# Patient Record
Sex: MALE | Race: WHITE | NOT HISPANIC OR LATINO | Employment: OTHER | URBAN - METROPOLITAN AREA
[De-identification: names, ages, dates, MRNs, and addresses within clinical notes are randomized per-mention and may not be internally consistent; named-entity substitution may affect disease eponyms.]

---

## 2014-03-20 LAB — CRC RECOMMENDATION EXT: NORMAL

## 2017-01-17 ENCOUNTER — HISTORICAL (OUTPATIENT)
Dept: CARDIOLOGY | Facility: HOSPITAL | Age: 62
End: 2017-01-17

## 2017-02-14 ENCOUNTER — HISTORICAL (OUTPATIENT)
Dept: CARDIOLOGY | Facility: HOSPITAL | Age: 62
End: 2017-02-14

## 2017-03-14 ENCOUNTER — HISTORICAL (OUTPATIENT)
Dept: CARDIOLOGY | Facility: HOSPITAL | Age: 62
End: 2017-03-14

## 2017-04-11 ENCOUNTER — HISTORICAL (OUTPATIENT)
Dept: CARDIOLOGY | Facility: HOSPITAL | Age: 62
End: 2017-04-11

## 2017-05-09 ENCOUNTER — HISTORICAL (OUTPATIENT)
Dept: CARDIOLOGY | Facility: HOSPITAL | Age: 62
End: 2017-05-09

## 2017-06-06 ENCOUNTER — HISTORICAL (OUTPATIENT)
Dept: CARDIOLOGY | Facility: HOSPITAL | Age: 62
End: 2017-06-06

## 2017-07-10 ENCOUNTER — HISTORICAL (OUTPATIENT)
Dept: CARDIOLOGY | Facility: HOSPITAL | Age: 62
End: 2017-07-10

## 2017-08-07 ENCOUNTER — HISTORICAL (OUTPATIENT)
Dept: CARDIOLOGY | Facility: HOSPITAL | Age: 62
End: 2017-08-07

## 2017-09-05 ENCOUNTER — HISTORICAL (OUTPATIENT)
Dept: CARDIOLOGY | Facility: HOSPITAL | Age: 62
End: 2017-09-05

## 2017-10-02 ENCOUNTER — HISTORICAL (OUTPATIENT)
Dept: CARDIOLOGY | Facility: HOSPITAL | Age: 62
End: 2017-10-02

## 2017-11-01 ENCOUNTER — HISTORICAL (OUTPATIENT)
Dept: CARDIOLOGY | Facility: HOSPITAL | Age: 62
End: 2017-11-01

## 2017-11-29 ENCOUNTER — HISTORICAL (OUTPATIENT)
Dept: CARDIOLOGY | Facility: HOSPITAL | Age: 62
End: 2017-11-29

## 2017-12-27 ENCOUNTER — HISTORICAL (OUTPATIENT)
Dept: CARDIOLOGY | Facility: HOSPITAL | Age: 62
End: 2017-12-27

## 2018-01-24 ENCOUNTER — HISTORICAL (OUTPATIENT)
Dept: CARDIOLOGY | Facility: HOSPITAL | Age: 63
End: 2018-01-24

## 2018-02-21 ENCOUNTER — HISTORICAL (OUTPATIENT)
Dept: CARDIOLOGY | Facility: HOSPITAL | Age: 63
End: 2018-02-21

## 2018-03-21 ENCOUNTER — HISTORICAL (OUTPATIENT)
Dept: CARDIOLOGY | Facility: HOSPITAL | Age: 63
End: 2018-03-21

## 2018-05-07 ENCOUNTER — HISTORICAL (OUTPATIENT)
Dept: CARDIOLOGY | Facility: HOSPITAL | Age: 63
End: 2018-05-07

## 2018-05-07 LAB
ALBUMIN SERPL-MCNC: 4 GM/DL (ref 3.4–5)
ALBUMIN/GLOB SERPL: 1.2 RATIO (ref 1.1–2)
ALP SERPL-CCNC: 80 UNIT/L (ref 50–136)
ALT SERPL-CCNC: 38 UNIT/L (ref 12–78)
AST SERPL-CCNC: 32 UNIT/L (ref 15–37)
BILIRUB SERPL-MCNC: 0.8 MG/DL (ref 0.2–1)
BILIRUBIN DIRECT+TOT PNL SERPL-MCNC: 0.2 MG/DL (ref 0–0.5)
BILIRUBIN DIRECT+TOT PNL SERPL-MCNC: 0.6 MG/DL (ref 0–0.8)
BUN SERPL-MCNC: 17 MG/DL (ref 7–18)
CALCIUM SERPL-MCNC: 9.3 MG/DL (ref 8.5–10.1)
CHLORIDE SERPL-SCNC: 105 MMOL/L (ref 98–107)
CHOLEST SERPL-MCNC: 114 MG/DL (ref 0–200)
CHOLEST/HDLC SERPL: 3 {RATIO} (ref 0–5)
CO2 SERPL-SCNC: 28 MMOL/L (ref 21–32)
CREAT SERPL-MCNC: 1.14 MG/DL (ref 0.7–1.3)
EST. AVERAGE GLUCOSE BLD GHB EST-MCNC: 169 MG/DL
GLOBULIN SER-MCNC: 3.4 GM/DL (ref 2.4–3.5)
GLUCOSE SERPL-MCNC: 134 MG/DL (ref 74–106)
HBA1C MFR BLD: 7.5 % (ref 4.2–6.3)
HDLC SERPL-MCNC: 38 MG/DL (ref 35–60)
LDLC SERPL CALC-MCNC: 38 MG/DL (ref 0–129)
POTASSIUM SERPL-SCNC: 3.7 MMOL/L (ref 3.5–5.1)
PROT SERPL-MCNC: 7.4 GM/DL (ref 6.4–8.2)
SODIUM SERPL-SCNC: 141 MMOL/L (ref 136–145)
TRIGL SERPL-MCNC: 189 MG/DL (ref 30–150)
TSH SERPL-ACNC: 8.77 MIU/L (ref 0.36–3.74)
VLDLC SERPL CALC-MCNC: 38 MG/DL

## 2018-06-04 ENCOUNTER — HISTORICAL (OUTPATIENT)
Dept: CARDIOLOGY | Facility: HOSPITAL | Age: 63
End: 2018-06-04

## 2018-06-18 ENCOUNTER — HISTORICAL (OUTPATIENT)
Dept: CARDIOLOGY | Facility: HOSPITAL | Age: 63
End: 2018-06-18

## 2018-07-02 ENCOUNTER — HISTORICAL (OUTPATIENT)
Dept: CARDIOLOGY | Facility: HOSPITAL | Age: 63
End: 2018-07-02

## 2018-07-30 ENCOUNTER — HISTORICAL (OUTPATIENT)
Dept: CARDIOLOGY | Facility: HOSPITAL | Age: 63
End: 2018-07-30

## 2018-08-09 ENCOUNTER — HISTORICAL (OUTPATIENT)
Dept: ADMINISTRATIVE | Facility: HOSPITAL | Age: 63
End: 2018-08-09

## 2018-08-09 LAB
EST. AVERAGE GLUCOSE BLD GHB EST-MCNC: 151 MG/DL
GLUCOSE P FAST SERPL-MCNC: 111 MG/DL (ref 70–115)
HBA1C MFR BLD: 6.9 % (ref 4.2–6.3)
TSH SERPL-ACNC: 0.04 MIU/L (ref 0.36–3.74)

## 2018-08-27 ENCOUNTER — HISTORICAL (OUTPATIENT)
Dept: CARDIOLOGY | Facility: HOSPITAL | Age: 63
End: 2018-08-27

## 2018-08-27 LAB
INR PPP: 3.14 (ref 0–1.27)
PROTHROMBIN TIME: 33.2 SECOND(S) (ref 12.2–14.7)

## 2018-09-06 ENCOUNTER — HISTORICAL (OUTPATIENT)
Dept: CARDIOLOGY | Facility: HOSPITAL | Age: 63
End: 2018-09-06

## 2018-09-18 ENCOUNTER — HISTORICAL (OUTPATIENT)
Dept: CARDIOLOGY | Facility: HOSPITAL | Age: 63
End: 2018-09-18

## 2018-09-18 LAB
INR PPP: 3.26 (ref 0–1.27)
PROTHROMBIN TIME: 34.2 SECOND(S) (ref 12.2–14.7)

## 2018-10-02 ENCOUNTER — HISTORICAL (OUTPATIENT)
Dept: CARDIOLOGY | Facility: HOSPITAL | Age: 63
End: 2018-10-02

## 2018-10-23 ENCOUNTER — HISTORICAL (OUTPATIENT)
Dept: CARDIOLOGY | Facility: HOSPITAL | Age: 63
End: 2018-10-23

## 2018-11-20 ENCOUNTER — HISTORICAL (OUTPATIENT)
Dept: CARDIOLOGY | Facility: HOSPITAL | Age: 63
End: 2018-11-20

## 2018-12-18 ENCOUNTER — HISTORICAL (OUTPATIENT)
Dept: CARDIOLOGY | Facility: HOSPITAL | Age: 63
End: 2018-12-18

## 2019-01-15 ENCOUNTER — HISTORICAL (OUTPATIENT)
Dept: CARDIOLOGY | Facility: HOSPITAL | Age: 64
End: 2019-01-15

## 2019-01-29 ENCOUNTER — HISTORICAL (OUTPATIENT)
Dept: CARDIOLOGY | Facility: HOSPITAL | Age: 64
End: 2019-01-29

## 2019-02-15 ENCOUNTER — HISTORICAL (OUTPATIENT)
Dept: ADMINISTRATIVE | Facility: HOSPITAL | Age: 64
End: 2019-02-15

## 2019-02-15 LAB
ABS NEUT (OLG): 6.9 X10(3)/MCL (ref 2.1–9.2)
ALBUMIN SERPL-MCNC: 3.7 GM/DL (ref 3.4–5)
ALBUMIN/GLOB SERPL: 1.2 {RATIO}
ALP SERPL-CCNC: 73 UNIT/L (ref 50–136)
ALT SERPL-CCNC: 27 UNIT/L (ref 12–78)
AST SERPL-CCNC: 21 UNIT/L (ref 15–37)
BASOPHILS # BLD AUTO: 0.1 X10(3)/MCL (ref 0–0.2)
BASOPHILS NFR BLD AUTO: 1 %
BILIRUB SERPL-MCNC: 0.7 MG/DL (ref 0.2–1)
BILIRUBIN DIRECT+TOT PNL SERPL-MCNC: 0.1 MG/DL (ref 0–0.2)
BILIRUBIN DIRECT+TOT PNL SERPL-MCNC: 0.6 MG/DL (ref 0–0.8)
BUN SERPL-MCNC: 12 MG/DL (ref 7–18)
CALCIUM SERPL-MCNC: 8.8 MG/DL (ref 8.5–10.1)
CHLORIDE SERPL-SCNC: 105 MMOL/L (ref 98–107)
CHOLEST SERPL-MCNC: 109 MG/DL (ref 0–200)
CHOLEST/HDLC SERPL: 2.7 {RATIO} (ref 0–5)
CO2 SERPL-SCNC: 30 MMOL/L (ref 21–32)
CREAT SERPL-MCNC: 1.01 MG/DL (ref 0.7–1.3)
EOSINOPHIL # BLD AUTO: 0.4 X10(3)/MCL (ref 0–0.9)
EOSINOPHIL NFR BLD AUTO: 4 %
ERYTHROCYTE [DISTWIDTH] IN BLOOD BY AUTOMATED COUNT: 14.6 % (ref 11.5–17)
EST. AVERAGE GLUCOSE BLD GHB EST-MCNC: 163 MG/DL
GLOBULIN SER-MCNC: 3.1 GM/DL (ref 2.4–3.5)
GLUCOSE SERPL-MCNC: 116 MG/DL (ref 74–106)
HBA1C MFR BLD: 7.3 % (ref 4.2–6.3)
HCT VFR BLD AUTO: 46.2 % (ref 42–52)
HDLC SERPL-MCNC: 41 MG/DL (ref 35–60)
HGB BLD-MCNC: 14.4 GM/DL (ref 14–18)
LDLC SERPL CALC-MCNC: 31 MG/DL (ref 0–129)
LYMPHOCYTES # BLD AUTO: 2.1 X10(3)/MCL (ref 0.6–4.6)
LYMPHOCYTES NFR BLD AUTO: 19 %
MCH RBC QN AUTO: 29.6 PG (ref 27–31)
MCHC RBC AUTO-ENTMCNC: 31.2 GM/DL (ref 33–36)
MCV RBC AUTO: 94.9 FL (ref 80–94)
MONOCYTES # BLD AUTO: 1.6 X10(3)/MCL (ref 0.1–1.3)
MONOCYTES NFR BLD AUTO: 14 %
NEUTROPHILS # BLD AUTO: 6.9 X10(3)/MCL (ref 2.1–9.2)
NEUTROPHILS NFR BLD AUTO: 62 %
PLATELET # BLD AUTO: 404 X10(3)/MCL (ref 130–400)
PMV BLD AUTO: 10.1 FL (ref 9.4–12.4)
POTASSIUM SERPL-SCNC: 4.2 MMOL/L (ref 3.5–5.1)
PROT SERPL-MCNC: 6.8 GM/DL (ref 6.4–8.2)
RBC # BLD AUTO: 4.87 X10(6)/MCL (ref 4.7–6.1)
SODIUM SERPL-SCNC: 142 MMOL/L (ref 136–145)
TRIGL SERPL-MCNC: 183 MG/DL (ref 30–150)
TSH SERPL-ACNC: 0.22 MIU/L (ref 0.36–3.74)
VLDLC SERPL CALC-MCNC: 37 MG/DL
WBC # SPEC AUTO: 11.2 X10(3)/MCL (ref 4.5–11.5)

## 2019-02-26 ENCOUNTER — HISTORICAL (OUTPATIENT)
Dept: CARDIOLOGY | Facility: HOSPITAL | Age: 64
End: 2019-02-26

## 2019-03-26 ENCOUNTER — HISTORICAL (OUTPATIENT)
Dept: CARDIOLOGY | Facility: HOSPITAL | Age: 64
End: 2019-03-26

## 2019-04-15 ENCOUNTER — HISTORICAL (OUTPATIENT)
Dept: CARDIOLOGY | Facility: HOSPITAL | Age: 64
End: 2019-04-15

## 2019-04-16 ENCOUNTER — HISTORICAL (OUTPATIENT)
Dept: ADMINISTRATIVE | Facility: HOSPITAL | Age: 64
End: 2019-04-16

## 2019-04-24 ENCOUNTER — HISTORICAL (OUTPATIENT)
Dept: CARDIOLOGY | Facility: HOSPITAL | Age: 64
End: 2019-04-24

## 2019-05-15 ENCOUNTER — HISTORICAL (OUTPATIENT)
Dept: CARDIOLOGY | Facility: HOSPITAL | Age: 64
End: 2019-05-15

## 2019-05-21 ENCOUNTER — HISTORICAL (OUTPATIENT)
Dept: ADMINISTRATIVE | Facility: HOSPITAL | Age: 64
End: 2019-05-21

## 2019-05-28 ENCOUNTER — HISTORICAL (OUTPATIENT)
Dept: CARDIOLOGY | Facility: HOSPITAL | Age: 64
End: 2019-05-28

## 2019-06-12 ENCOUNTER — HISTORICAL (OUTPATIENT)
Dept: CARDIOLOGY | Facility: HOSPITAL | Age: 64
End: 2019-06-12

## 2019-07-10 ENCOUNTER — HISTORICAL (OUTPATIENT)
Dept: CARDIOLOGY | Facility: HOSPITAL | Age: 64
End: 2019-07-10

## 2019-08-07 ENCOUNTER — HISTORICAL (OUTPATIENT)
Dept: CARDIOLOGY | Facility: HOSPITAL | Age: 64
End: 2019-08-07

## 2019-08-20 ENCOUNTER — HISTORICAL (OUTPATIENT)
Dept: ADMINISTRATIVE | Facility: HOSPITAL | Age: 64
End: 2019-08-20

## 2019-08-20 LAB
BUN SERPL-MCNC: 21 MG/DL (ref 7–18)
CALCIUM SERPL-MCNC: 9.5 MG/DL (ref 8.5–10.1)
CHLORIDE SERPL-SCNC: 103 MMOL/L (ref 98–107)
CHOLEST SERPL-MCNC: 132 MG/DL (ref 0–200)
CHOLEST/HDLC SERPL: 3.2 {RATIO} (ref 0–5)
CO2 SERPL-SCNC: 33 MMOL/L (ref 21–32)
CREAT SERPL-MCNC: 1.35 MG/DL (ref 0.7–1.3)
CREAT/UREA NIT SERPL: 15.6
EST. AVERAGE GLUCOSE BLD GHB EST-MCNC: 143 MG/DL
GLUCOSE SERPL-MCNC: 132 MG/DL (ref 74–106)
HBA1C MFR BLD: 6.6 % (ref 4.2–6.3)
HDLC SERPL-MCNC: 41 MG/DL (ref 35–60)
LDLC SERPL CALC-MCNC: 46 MG/DL (ref 0–129)
POTASSIUM SERPL-SCNC: 4.8 MMOL/L (ref 3.5–5.1)
SODIUM SERPL-SCNC: 141 MMOL/L (ref 136–145)
TRIGL SERPL-MCNC: 224 MG/DL (ref 30–150)
TSH SERPL-ACNC: 1.84 MIU/L (ref 0.36–3.74)
VLDLC SERPL CALC-MCNC: 45 MG/DL

## 2019-09-04 ENCOUNTER — HISTORICAL (OUTPATIENT)
Dept: CARDIOLOGY | Facility: HOSPITAL | Age: 64
End: 2019-09-04

## 2019-09-04 LAB
INR PPP: 4.4 (ref 0–1.3)
PROTHROMBIN TIME: 43.4 SECOND(S) (ref 12–14)

## 2019-09-09 ENCOUNTER — HISTORICAL (OUTPATIENT)
Dept: CARDIOLOGY | Facility: HOSPITAL | Age: 64
End: 2019-09-09

## 2019-09-16 ENCOUNTER — HISTORICAL (OUTPATIENT)
Dept: CARDIOLOGY | Facility: HOSPITAL | Age: 64
End: 2019-09-16

## 2019-10-07 ENCOUNTER — HISTORICAL (OUTPATIENT)
Dept: CARDIOLOGY | Facility: HOSPITAL | Age: 64
End: 2019-10-07

## 2019-10-07 LAB
INR PPP: 4.3 (ref 0–1.3)
PROTHROMBIN TIME: 42.3 SECOND(S) (ref 12–14)

## 2019-10-10 ENCOUNTER — HISTORICAL (OUTPATIENT)
Dept: CARDIOLOGY | Facility: HOSPITAL | Age: 64
End: 2019-10-10

## 2019-10-17 ENCOUNTER — HISTORICAL (OUTPATIENT)
Dept: CARDIOLOGY | Facility: HOSPITAL | Age: 64
End: 2019-10-17

## 2019-10-31 ENCOUNTER — HISTORICAL (OUTPATIENT)
Dept: CARDIOLOGY | Facility: HOSPITAL | Age: 64
End: 2019-10-31

## 2019-11-13 ENCOUNTER — HISTORICAL (OUTPATIENT)
Dept: CARDIOLOGY | Facility: HOSPITAL | Age: 64
End: 2019-11-13

## 2019-12-11 ENCOUNTER — HISTORICAL (OUTPATIENT)
Dept: CARDIOLOGY | Facility: HOSPITAL | Age: 64
End: 2019-12-11

## 2019-12-18 ENCOUNTER — HISTORICAL (OUTPATIENT)
Dept: ADMINISTRATIVE | Facility: HOSPITAL | Age: 64
End: 2019-12-18

## 2020-01-08 ENCOUNTER — HISTORICAL (OUTPATIENT)
Dept: CARDIOLOGY | Facility: HOSPITAL | Age: 65
End: 2020-01-08

## 2020-02-05 ENCOUNTER — HISTORICAL (OUTPATIENT)
Dept: CARDIOLOGY | Facility: HOSPITAL | Age: 65
End: 2020-02-05

## 2020-03-04 ENCOUNTER — HISTORICAL (OUTPATIENT)
Dept: CARDIOLOGY | Facility: HOSPITAL | Age: 65
End: 2020-03-04

## 2020-04-15 ENCOUNTER — HISTORICAL (OUTPATIENT)
Dept: CARDIOLOGY | Facility: HOSPITAL | Age: 65
End: 2020-04-15

## 2020-05-13 ENCOUNTER — HISTORICAL (OUTPATIENT)
Dept: CARDIOLOGY | Facility: HOSPITAL | Age: 65
End: 2020-05-13

## 2020-06-03 ENCOUNTER — HISTORICAL (OUTPATIENT)
Dept: CARDIOLOGY | Facility: HOSPITAL | Age: 65
End: 2020-06-03

## 2020-07-22 ENCOUNTER — HISTORICAL (OUTPATIENT)
Dept: CARDIOLOGY | Facility: HOSPITAL | Age: 65
End: 2020-07-22

## 2020-08-19 ENCOUNTER — HISTORICAL (OUTPATIENT)
Dept: CARDIOLOGY | Facility: HOSPITAL | Age: 65
End: 2020-08-19

## 2020-09-02 ENCOUNTER — HISTORICAL (OUTPATIENT)
Dept: CARDIOLOGY | Facility: HOSPITAL | Age: 65
End: 2020-09-02

## 2020-09-30 ENCOUNTER — HISTORICAL (OUTPATIENT)
Dept: CARDIOLOGY | Facility: HOSPITAL | Age: 65
End: 2020-09-30

## 2020-10-14 ENCOUNTER — HISTORICAL (OUTPATIENT)
Dept: CARDIOLOGY | Facility: HOSPITAL | Age: 65
End: 2020-10-14

## 2020-10-15 ENCOUNTER — HISTORICAL (OUTPATIENT)
Dept: ADMINISTRATIVE | Facility: HOSPITAL | Age: 65
End: 2020-10-15

## 2020-10-15 LAB
ABS NEUT (OLG): 7.72 X10(3)/MCL (ref 2.1–9.2)
ALBUMIN SERPL-MCNC: 4.1 GM/DL (ref 3.4–5)
ALBUMIN/GLOB SERPL: 1.4 RATIO (ref 1.1–2)
ALP SERPL-CCNC: 85 UNIT/L (ref 40–150)
ALT SERPL-CCNC: 23 UNIT/L (ref 0–55)
AST SERPL-CCNC: 22 UNIT/L (ref 5–34)
BASOPHILS # BLD AUTO: 0.1 X10(3)/MCL (ref 0–0.2)
BASOPHILS NFR BLD AUTO: 1 %
BILIRUB SERPL-MCNC: 0.6 MG/DL
BILIRUBIN DIRECT+TOT PNL SERPL-MCNC: 0.2 MG/DL (ref 0–0.5)
BILIRUBIN DIRECT+TOT PNL SERPL-MCNC: 0.4 MG/DL (ref 0–0.8)
BUN SERPL-MCNC: 15.5 MG/DL (ref 8.4–25.7)
CALCIUM SERPL-MCNC: 9.2 MG/DL (ref 8.8–10)
CHLORIDE SERPL-SCNC: 107 MMOL/L (ref 98–107)
CHOLEST SERPL-MCNC: 113 MG/DL
CHOLEST/HDLC SERPL: 3 {RATIO} (ref 0–5)
CO2 SERPL-SCNC: 27 MMOL/L (ref 23–31)
CREAT SERPL-MCNC: 1.05 MG/DL (ref 0.73–1.18)
EOSINOPHIL # BLD AUTO: 0.4 X10(3)/MCL (ref 0–0.9)
EOSINOPHIL NFR BLD AUTO: 3 %
ERYTHROCYTE [DISTWIDTH] IN BLOOD BY AUTOMATED COUNT: 15.2 % (ref 11.5–17)
EST. AVERAGE GLUCOSE BLD GHB EST-MCNC: 154.2 MG/DL
GLOBULIN SER-MCNC: 3 GM/DL (ref 2.4–3.5)
GLUCOSE SERPL-MCNC: 122 MG/DL (ref 82–115)
HBA1C MFR BLD: 7 %
HCT VFR BLD AUTO: 45.8 % (ref 42–52)
HDLC SERPL-MCNC: 36 MG/DL (ref 35–60)
HGB BLD-MCNC: 14.3 GM/DL (ref 14–18)
LDLC SERPL CALC-MCNC: 36 MG/DL (ref 50–140)
LYMPHOCYTES # BLD AUTO: 1.7 X10(3)/MCL (ref 0.6–4.6)
LYMPHOCYTES NFR BLD AUTO: 15 %
MCH RBC QN AUTO: 29.5 PG (ref 27–31)
MCHC RBC AUTO-ENTMCNC: 31.2 GM/DL (ref 33–36)
MCV RBC AUTO: 94.4 FL (ref 80–94)
MONOCYTES # BLD AUTO: 1.5 X10(3)/MCL (ref 0.1–1.3)
MONOCYTES NFR BLD AUTO: 13 %
NEUTROPHILS # BLD AUTO: 7.72 X10(3)/MCL (ref 2.1–9.2)
NEUTROPHILS NFR BLD AUTO: 67 %
PLATELET # BLD AUTO: 435 X10(3)/MCL (ref 130–400)
PMV BLD AUTO: 9.9 FL (ref 9.4–12.4)
POTASSIUM SERPL-SCNC: 4.7 MMOL/L (ref 3.5–5.1)
PROT SERPL-MCNC: 7.1 GM/DL (ref 5.8–7.6)
RBC # BLD AUTO: 4.85 X10(6)/MCL (ref 4.7–6.1)
SODIUM SERPL-SCNC: 148 MMOL/L (ref 136–145)
TRIGL SERPL-MCNC: 207 MG/DL (ref 34–140)
TSH SERPL-ACNC: 2.47 UIU/ML (ref 0.35–4.94)
VLDLC SERPL CALC-MCNC: 41 MG/DL
WBC # SPEC AUTO: 11.5 X10(3)/MCL (ref 4.5–11.5)

## 2020-10-19 ENCOUNTER — HISTORICAL (OUTPATIENT)
Dept: ADMINISTRATIVE | Facility: HOSPITAL | Age: 65
End: 2020-10-19

## 2020-10-28 ENCOUNTER — HISTORICAL (OUTPATIENT)
Dept: CARDIOLOGY | Facility: HOSPITAL | Age: 65
End: 2020-10-28

## 2020-11-25 ENCOUNTER — HISTORICAL (OUTPATIENT)
Dept: CARDIOLOGY | Facility: HOSPITAL | Age: 65
End: 2020-11-25

## 2020-12-10 ENCOUNTER — HISTORICAL (OUTPATIENT)
Dept: CARDIOLOGY | Facility: HOSPITAL | Age: 65
End: 2020-12-10

## 2020-12-10 LAB
ERYTHROCYTE [DISTWIDTH] IN BLOOD BY AUTOMATED COUNT: 14.6 % (ref 11.5–17)
ERYTHROCYTE [SEDIMENTATION RATE] IN BLOOD: 14 MM/HR (ref 0–15)
HCT VFR BLD AUTO: 47.2 % (ref 42–52)
HGB BLD-MCNC: 14.7 GM/DL (ref 14–18)
MCH RBC QN AUTO: 30.4 PG (ref 27–31)
MCHC RBC AUTO-ENTMCNC: 31.1 GM/DL (ref 33–36)
MCV RBC AUTO: 97.7 FL (ref 80–94)
PLATELET # BLD AUTO: 435 X10(3)/MCL (ref 130–400)
PMV BLD AUTO: 10.5 FL (ref 9.4–12.4)
RBC # BLD AUTO: 4.83 X10(6)/MCL (ref 4.7–6.1)
URATE SERPL-MCNC: 6.7 MG/DL (ref 3.5–7.2)
WBC # SPEC AUTO: 17.5 X10(3)/MCL (ref 4.5–11.5)

## 2020-12-17 ENCOUNTER — HISTORICAL (OUTPATIENT)
Dept: CARDIOLOGY | Facility: HOSPITAL | Age: 65
End: 2020-12-17

## 2021-01-14 ENCOUNTER — HISTORICAL (OUTPATIENT)
Dept: CARDIOLOGY | Facility: HOSPITAL | Age: 66
End: 2021-01-14

## 2021-01-21 ENCOUNTER — HISTORICAL (OUTPATIENT)
Dept: CARDIOLOGY | Facility: HOSPITAL | Age: 66
End: 2021-01-21

## 2021-01-27 ENCOUNTER — HISTORICAL (OUTPATIENT)
Dept: ADMINISTRATIVE | Facility: HOSPITAL | Age: 66
End: 2021-01-27

## 2021-03-01 ENCOUNTER — HISTORICAL (OUTPATIENT)
Dept: CARDIOLOGY | Facility: HOSPITAL | Age: 66
End: 2021-03-01

## 2021-03-23 ENCOUNTER — HISTORICAL (OUTPATIENT)
Dept: ADMINISTRATIVE | Facility: HOSPITAL | Age: 66
End: 2021-03-23

## 2021-03-23 LAB — URATE SERPL-MCNC: 4.6 MG/DL (ref 3.5–7.2)

## 2021-03-29 ENCOUNTER — HISTORICAL (OUTPATIENT)
Dept: CARDIOLOGY | Facility: HOSPITAL | Age: 66
End: 2021-03-29

## 2021-04-12 ENCOUNTER — HISTORICAL (OUTPATIENT)
Dept: CARDIOLOGY | Facility: HOSPITAL | Age: 66
End: 2021-04-12

## 2021-04-22 ENCOUNTER — HISTORICAL (OUTPATIENT)
Dept: CARDIOLOGY | Facility: HOSPITAL | Age: 66
End: 2021-04-22

## 2021-05-13 ENCOUNTER — HISTORICAL (OUTPATIENT)
Dept: CARDIOLOGY | Facility: HOSPITAL | Age: 66
End: 2021-05-13

## 2021-06-10 ENCOUNTER — HISTORICAL (OUTPATIENT)
Dept: CARDIOLOGY | Facility: HOSPITAL | Age: 66
End: 2021-06-10

## 2021-06-11 ENCOUNTER — HISTORICAL (OUTPATIENT)
Dept: ADMINISTRATIVE | Facility: HOSPITAL | Age: 66
End: 2021-06-11

## 2021-06-11 LAB
ABS NEUT (OLG): 5.69 X10(3)/MCL (ref 2.1–9.2)
BASOPHILS # BLD AUTO: 0.1 X10(3)/MCL (ref 0–0.2)
BASOPHILS NFR BLD AUTO: 1 %
BUN SERPL-MCNC: 18.2 MG/DL (ref 8.4–25.7)
CALCIUM SERPL-MCNC: 9.5 MG/DL (ref 8.8–10)
CHLORIDE SERPL-SCNC: 99 MMOL/L (ref 98–107)
CO2 SERPL-SCNC: 29 MMOL/L (ref 23–31)
CREAT SERPL-MCNC: 1.22 MG/DL (ref 0.73–1.18)
CREAT/UREA NIT SERPL: 15
EOSINOPHIL # BLD AUTO: 0.3 X10(3)/MCL (ref 0–0.9)
EOSINOPHIL NFR BLD AUTO: 4 %
ERYTHROCYTE [DISTWIDTH] IN BLOOD BY AUTOMATED COUNT: 14.6 % (ref 11.5–17)
EST. AVERAGE GLUCOSE BLD GHB EST-MCNC: 145.6 MG/DL
GLUCOSE SERPL-MCNC: 129 MG/DL (ref 82–115)
HBA1C MFR BLD: 6.7 %
HCT VFR BLD AUTO: 44.2 % (ref 42–52)
HGB BLD-MCNC: 14.4 GM/DL (ref 14–18)
LYMPHOCYTES # BLD AUTO: 1.6 X10(3)/MCL (ref 0.6–4.6)
LYMPHOCYTES NFR BLD AUTO: 18 %
MCH RBC QN AUTO: 30.3 PG (ref 27–31)
MCHC RBC AUTO-ENTMCNC: 32.6 GM/DL (ref 33–36)
MCV RBC AUTO: 92.9 FL (ref 80–94)
MONOCYTES # BLD AUTO: 1.3 X10(3)/MCL (ref 0.1–1.3)
MONOCYTES NFR BLD AUTO: 14 %
NEUTROPHILS # BLD AUTO: 5.69 X10(3)/MCL (ref 2.1–9.2)
NEUTROPHILS NFR BLD AUTO: 63 %
PLATELET # BLD AUTO: 430 X10(3)/MCL (ref 130–400)
PMV BLD AUTO: 10.8 FL (ref 9.4–12.4)
POTASSIUM SERPL-SCNC: 4.6 MMOL/L (ref 3.5–5.1)
RBC # BLD AUTO: 4.76 X10(6)/MCL (ref 4.7–6.1)
SODIUM SERPL-SCNC: 136 MMOL/L (ref 136–145)
TSH SERPL-ACNC: 2.67 UIU/ML (ref 0.35–4.94)
WBC # SPEC AUTO: 9 X10(3)/MCL (ref 4.5–11.5)

## 2021-06-16 ENCOUNTER — HISTORICAL (OUTPATIENT)
Dept: ADMINISTRATIVE | Facility: HOSPITAL | Age: 66
End: 2021-06-16

## 2021-06-16 LAB
BNP BLD-MCNC: 49.7 PG/ML
BUN SERPL-MCNC: 24 MG/DL (ref 8.4–25.7)
CALCIUM SERPL-MCNC: 9.9 MG/DL (ref 8.8–10)
CHLORIDE SERPL-SCNC: 99 MMOL/L (ref 98–107)
CO2 SERPL-SCNC: 32 MMOL/L (ref 23–31)
CREAT SERPL-MCNC: 1.96 MG/DL (ref 0.73–1.18)
CREAT/UREA NIT SERPL: 12
GLUCOSE SERPL-MCNC: 96 MG/DL (ref 82–115)
POTASSIUM SERPL-SCNC: 4.8 MMOL/L (ref 3.5–5.1)
SODIUM SERPL-SCNC: 141 MMOL/L (ref 136–145)

## 2021-07-01 ENCOUNTER — HISTORICAL (OUTPATIENT)
Dept: CARDIOLOGY | Facility: HOSPITAL | Age: 66
End: 2021-07-01

## 2021-07-20 ENCOUNTER — HISTORICAL (OUTPATIENT)
Dept: ADMINISTRATIVE | Facility: HOSPITAL | Age: 66
End: 2021-07-20

## 2021-07-29 ENCOUNTER — HISTORICAL (OUTPATIENT)
Dept: CARDIOLOGY | Facility: HOSPITAL | Age: 66
End: 2021-07-29

## 2021-08-16 ENCOUNTER — HISTORICAL (OUTPATIENT)
Dept: CARDIOLOGY | Facility: HOSPITAL | Age: 66
End: 2021-08-16

## 2021-08-23 ENCOUNTER — HISTORICAL (OUTPATIENT)
Dept: CARDIOLOGY | Facility: HOSPITAL | Age: 66
End: 2021-08-23

## 2021-09-13 ENCOUNTER — HISTORICAL (OUTPATIENT)
Dept: CARDIOLOGY | Facility: HOSPITAL | Age: 66
End: 2021-09-13

## 2021-10-05 ENCOUNTER — HISTORICAL (OUTPATIENT)
Dept: CARDIOLOGY | Facility: HOSPITAL | Age: 66
End: 2021-10-05

## 2021-10-11 ENCOUNTER — HISTORICAL (OUTPATIENT)
Dept: CARDIOLOGY | Facility: HOSPITAL | Age: 66
End: 2021-10-11

## 2021-11-18 LAB
ABS NEUT (OLG): 7.38 X10(3)/MCL (ref 2.1–9.2)
ALBUMIN SERPL-MCNC: 2.9 GM/DL (ref 3.4–4.8)
ALBUMIN/GLOB SERPL: 0.9 RATIO (ref 1.1–2)
ALP SERPL-CCNC: 73 UNIT/L (ref 40–150)
ALT SERPL-CCNC: 11 UNIT/L (ref 0–55)
AST SERPL-CCNC: 13 UNIT/L (ref 5–34)
BASOPHILS # BLD AUTO: 0.11 X10(3)/MCL (ref 0–0.2)
BASOPHILS NFR BLD AUTO: 1.1 % (ref 0–0.9)
BILIRUB SERPL-MCNC: 0.5 MG/DL (ref 0.2–1.2)
BILIRUBIN DIRECT+TOT PNL SERPL-MCNC: 0.2 MG/DL (ref 0–0.8)
BILIRUBIN DIRECT+TOT PNL SERPL-MCNC: 0.3 MG/DL (ref 0–0.5)
BUN SERPL-MCNC: 15.9 MG/DL (ref 8.4–25.7)
CALCIUM SERPL-MCNC: 8.6 MG/DL (ref 8.8–10)
CHLORIDE SERPL-SCNC: 102 MMOL/L (ref 98–107)
CO2 SERPL-SCNC: 28 MMOL/L (ref 23–31)
CREAT SERPL-MCNC: 0.78 MG/DL (ref 0.72–1.25)
EOSINOPHIL # BLD AUTO: 0.3 X10(3)/MCL (ref 0–0.9)
EOSINOPHIL NFR BLD AUTO: 3 % (ref 0–6.5)
ERYTHROCYTE [DISTWIDTH] IN BLOOD BY AUTOMATED COUNT: 15.6 % (ref 11.5–17)
EST. AVERAGE GLUCOSE BLD GHB EST-MCNC: 119.8 MG/DL
GLOBULIN SER-MCNC: 3.2 GM/DL (ref 2.4–3.5)
GLUCOSE SERPL-MCNC: 128 MG/DL (ref 82–115)
HBA1C MFR BLD: 5.8 %
HCT VFR BLD AUTO: 32.3 % (ref 42–52)
HGB BLD-MCNC: 10.1 GM/DL (ref 14–18)
IMM GRANULOCYTES # BLD AUTO: 0.05 10*3/UL (ref 0–0.02)
IMM GRANULOCYTES NFR BLD AUTO: 0.5 % (ref 0–0.43)
INR PPP: 1.3 (ref 2–3)
LYMPHOCYTES # BLD AUTO: 0.87 X10(3)/MCL (ref 0.6–4.6)
LYMPHOCYTES NFR BLD AUTO: 8.6 % (ref 16.2–38.3)
MCH RBC QN AUTO: 29.8 PG (ref 27–31)
MCHC RBC AUTO-ENTMCNC: 31.3 GM/DL (ref 33–36)
MCV RBC AUTO: 95.3 FL (ref 80–94)
MONOCYTES # BLD AUTO: 1.4 X10(3)/MCL (ref 0.1–1.3)
MONOCYTES NFR BLD AUTO: 13.8 % (ref 4.7–11.3)
NEUTROPHILS # BLD AUTO: 7.38 X10(3)/MCL (ref 2.1–9.2)
NEUTROPHILS NFR BLD AUTO: 73 % (ref 49.1–73.4)
NRBC BLD AUTO-RTO: 0.2 % (ref 0–0.2)
PLATELET # BLD AUTO: 543 X10(3)/MCL (ref 130–400)
PMV BLD AUTO: 9.4 FL (ref 7.4–10.4)
POTASSIUM SERPL-SCNC: 4.3 MMOL/L (ref 3.5–5.1)
PREALB SERPL-MCNC: 19.6 MG/DL (ref 16–42)
PROT SERPL-MCNC: 6.1 GM/DL (ref 5.8–7.6)
PROTHROMBIN TIME: 15.5 SEC (ref 11.7–14.5)
RBC # BLD AUTO: 3.39 X10(6)/MCL (ref 4.7–6.1)
SODIUM SERPL-SCNC: 138 MMOL/L (ref 136–145)
WBC # SPEC AUTO: 10.1 X10(3)/MCL (ref 4.5–11.5)

## 2021-11-20 LAB
INR PPP: 1.4 (ref 2–3)
PROTHROMBIN TIME: 17.1 SEC (ref 11.7–14.5)

## 2021-11-22 ENCOUNTER — HISTORICAL (OUTPATIENT)
Dept: ADMINISTRATIVE | Facility: HOSPITAL | Age: 66
End: 2021-11-22

## 2021-11-22 LAB
ABS NEUT (OLG): 8.42 X10(3)/MCL (ref 2.1–9.2)
ALBUMIN SERPL-MCNC: 3.1 GM/DL (ref 3.4–4.8)
ALBUMIN/GLOB SERPL: 1.1 RATIO (ref 1.1–2)
ALP SERPL-CCNC: 85 UNIT/L (ref 40–150)
ALT SERPL-CCNC: 10 UNIT/L (ref 0–55)
AST SERPL-CCNC: 12 UNIT/L (ref 5–34)
BASOPHILS # BLD AUTO: 0.08 X10(3)/MCL (ref 0–0.2)
BASOPHILS NFR BLD AUTO: 0.7 % (ref 0–0.9)
BILIRUB SERPL-MCNC: 0.5 MG/DL (ref 0.2–1.2)
BILIRUBIN DIRECT+TOT PNL SERPL-MCNC: 0.2 MG/DL (ref 0–0.8)
BILIRUBIN DIRECT+TOT PNL SERPL-MCNC: 0.3 MG/DL (ref 0–0.5)
BUN SERPL-MCNC: 17.7 MG/DL (ref 8.4–25.7)
CALCIUM SERPL-MCNC: 8.7 MG/DL (ref 8.8–10)
CHLORIDE SERPL-SCNC: 101 MMOL/L (ref 98–107)
CO2 SERPL-SCNC: 30 MMOL/L (ref 23–31)
CREAT SERPL-MCNC: 0.77 MG/DL (ref 0.72–1.25)
DEPRECATED CALCIDIOL+CALCIFEROL SERPL-MC: 41.8 NG/ML (ref 30–80)
EOSINOPHIL # BLD AUTO: 0.26 X10(3)/MCL (ref 0–0.9)
EOSINOPHIL NFR BLD AUTO: 2.4 % (ref 0–6.5)
ERYTHROCYTE [DISTWIDTH] IN BLOOD BY AUTOMATED COUNT: 15.4 % (ref 11.5–17)
GLOBULIN SER-MCNC: 2.8 GM/DL (ref 2.4–3.5)
GLUCOSE SERPL-MCNC: 159 MG/DL (ref 82–115)
HCT VFR BLD AUTO: 32 % (ref 42–52)
HGB BLD-MCNC: 10 GM/DL (ref 14–18)
IMM GRANULOCYTES # BLD AUTO: 0.06 10*3/UL (ref 0–0.02)
IMM GRANULOCYTES NFR BLD AUTO: 0.5 % (ref 0–0.43)
INR PPP: 1.4 (ref 2–3)
LYMPHOCYTES # BLD AUTO: 0.86 X10(3)/MCL (ref 0.6–4.6)
LYMPHOCYTES NFR BLD AUTO: 7.9 % (ref 16.2–38.3)
MCH RBC QN AUTO: 30.3 PG (ref 27–31)
MCHC RBC AUTO-ENTMCNC: 31.3 GM/DL (ref 33–36)
MCV RBC AUTO: 97 FL (ref 80–94)
MONOCYTES # BLD AUTO: 1.26 X10(3)/MCL (ref 0.1–1.3)
MONOCYTES NFR BLD AUTO: 11.5 % (ref 4.7–11.3)
NEUTROPHILS # BLD AUTO: 8.42 X10(3)/MCL (ref 2.1–9.2)
NEUTROPHILS NFR BLD AUTO: 77 % (ref 49.1–73.4)
NRBC BLD AUTO-RTO: 0.5 % (ref 0–0.2)
PLATELET # BLD AUTO: 428 X10(3)/MCL (ref 130–400)
PMV BLD AUTO: 9.7 FL (ref 7.4–10.4)
POTASSIUM SERPL-SCNC: 4.5 MMOL/L (ref 3.5–5.1)
PREALB SERPL-MCNC: 22.1 MG/DL (ref 16–42)
PROT SERPL-MCNC: 5.9 GM/DL (ref 5.8–7.6)
PROTHROMBIN TIME: 17.3 SEC (ref 11.7–14.5)
RBC # BLD AUTO: 3.3 X10(6)/MCL (ref 4.7–6.1)
SODIUM SERPL-SCNC: 140 MMOL/L (ref 136–145)
WBC # SPEC AUTO: 10.9 X10(3)/MCL (ref 4.5–11.5)

## 2021-11-23 LAB
INR PPP: 1.5 (ref 2–3)
PROTHROMBIN TIME: 17.6 SEC (ref 11.7–14.5)

## 2021-11-24 LAB
INR PPP: 1.6 (ref 2–3)
PROTHROMBIN TIME: 18.3 SEC (ref 11.7–14.5)

## 2021-11-25 LAB
ABS NEUT (OLG): 8.48 X10(3)/MCL (ref 2.1–9.2)
ALBUMIN SERPL-MCNC: 3.3 GM/DL (ref 3.4–4.8)
ALBUMIN/GLOB SERPL: 1 RATIO (ref 1.1–2)
ALP SERPL-CCNC: 85 UNIT/L (ref 40–150)
ALT SERPL-CCNC: 11 UNIT/L (ref 0–55)
AST SERPL-CCNC: 20 UNIT/L (ref 5–34)
BASOPHILS # BLD AUTO: 0.08 X10(3)/MCL (ref 0–0.2)
BASOPHILS NFR BLD AUTO: 0.8 % (ref 0–0.9)
BILIRUB SERPL-MCNC: 0.8 MG/DL (ref 0.2–1.2)
BILIRUBIN DIRECT+TOT PNL SERPL-MCNC: 0.4 MG/DL (ref 0–0.5)
BILIRUBIN DIRECT+TOT PNL SERPL-MCNC: 0.4 MG/DL (ref 0–0.8)
BUN SERPL-MCNC: 23 MG/DL (ref 8.4–25.7)
CALCIUM SERPL-MCNC: 8.8 MG/DL (ref 8.8–10)
CHLORIDE SERPL-SCNC: 98 MMOL/L (ref 98–107)
CO2 SERPL-SCNC: 30 MMOL/L (ref 23–31)
CREAT SERPL-MCNC: 0.83 MG/DL (ref 0.72–1.25)
CROSSMATCH INTERPRETATION: NORMAL
EOSINOPHIL # BLD AUTO: 0.19 X10(3)/MCL (ref 0–0.9)
EOSINOPHIL NFR BLD AUTO: 1.8 % (ref 0–6.5)
ERYTHROCYTE [DISTWIDTH] IN BLOOD BY AUTOMATED COUNT: 15.3 % (ref 11.5–17)
GLOBULIN SER-MCNC: 3.3 GM/DL (ref 2.4–3.5)
GLUCOSE SERPL-MCNC: 135 MG/DL (ref 82–115)
GROUP & RH: NORMAL
HCT VFR BLD AUTO: 32.2 % (ref 42–52)
HGB BLD-MCNC: 9.8 GM/DL (ref 14–18)
IMM GRANULOCYTES # BLD AUTO: 0.04 10*3/UL (ref 0–0.02)
IMM GRANULOCYTES NFR BLD AUTO: 0.4 % (ref 0–0.43)
INR PPP: 1.8 (ref 2–3)
LYMPHOCYTES # BLD AUTO: 0.68 X10(3)/MCL (ref 0.6–4.6)
LYMPHOCYTES NFR BLD AUTO: 6.4 % (ref 16.2–38.3)
MCH RBC QN AUTO: 29.5 PG (ref 27–31)
MCHC RBC AUTO-ENTMCNC: 30.4 GM/DL (ref 33–36)
MCV RBC AUTO: 97 FL (ref 80–94)
MONOCYTES # BLD AUTO: 1.16 X10(3)/MCL (ref 0.1–1.3)
MONOCYTES NFR BLD AUTO: 10.9 % (ref 4.7–11.3)
NEUTROPHILS # BLD AUTO: 8.48 X10(3)/MCL (ref 2.1–9.2)
NEUTROPHILS NFR BLD AUTO: 79.7 % (ref 49.1–73.4)
NRBC BLD AUTO-RTO: 0.4 % (ref 0–0.2)
PLATELET # BLD AUTO: 401 X10(3)/MCL (ref 130–400)
PMV BLD AUTO: 10 FL (ref 7.4–10.4)
POTASSIUM SERPL-SCNC: 4.1 MMOL/L (ref 3.5–5.1)
PRODUCT READY: NORMAL
PROT SERPL-MCNC: 6.6 GM/DL (ref 5.8–7.6)
PROTHROMBIN TIME: 20.8 SEC (ref 11.7–14.5)
RBC # BLD AUTO: 3.32 X10(6)/MCL (ref 4.7–6.1)
SODIUM SERPL-SCNC: 140 MMOL/L (ref 136–145)
TSH SERPL-ACNC: 32.97 UIU/ML (ref 0.35–4.94)
WBC # SPEC AUTO: 10.6 X10(3)/MCL (ref 4.5–11.5)

## 2021-12-16 ENCOUNTER — HISTORICAL (OUTPATIENT)
Dept: ADMINISTRATIVE | Facility: HOSPITAL | Age: 66
End: 2021-12-16

## 2021-12-16 LAB
ABS NEUT (OLG): 7.11 X10(3)/MCL (ref 2.1–9.2)
ALBUMIN SERPL-MCNC: 3.2 GM/DL (ref 3.4–4.8)
ALBUMIN/GLOB SERPL: 1 RATIO (ref 1.1–2)
ALP SERPL-CCNC: 112 UNIT/L (ref 40–150)
ALT SERPL-CCNC: 22 UNIT/L (ref 0–55)
AST SERPL-CCNC: 22 UNIT/L (ref 5–34)
BASOPHILS # BLD AUTO: 0.1 X10(3)/MCL (ref 0–0.2)
BASOPHILS NFR BLD AUTO: 1 %
BILIRUB SERPL-MCNC: 0.5 MG/DL
BILIRUBIN DIRECT+TOT PNL SERPL-MCNC: 0.2 MG/DL (ref 0–0.8)
BILIRUBIN DIRECT+TOT PNL SERPL-MCNC: 0.3 MG/DL (ref 0–0.5)
BUN SERPL-MCNC: 23.6 MG/DL (ref 8.4–25.7)
CALCIUM SERPL-MCNC: 9.2 MG/DL (ref 8.7–10.5)
CHLORIDE SERPL-SCNC: 92 MMOL/L (ref 98–107)
CO2 SERPL-SCNC: 37 MMOL/L (ref 23–31)
CREAT SERPL-MCNC: 0.79 MG/DL (ref 0.73–1.18)
EOSINOPHIL # BLD AUTO: 1.4 X10(3)/MCL (ref 0–0.9)
EOSINOPHIL NFR BLD AUTO: 13 %
ERYTHROCYTE [DISTWIDTH] IN BLOOD BY AUTOMATED COUNT: 15.6 % (ref 11.5–17)
GLOBULIN SER-MCNC: 3.1 GM/DL (ref 2.4–3.5)
GLUCOSE SERPL-MCNC: 160 MG/DL (ref 82–115)
HCT VFR BLD AUTO: 34 % (ref 42–52)
HGB BLD-MCNC: 9.8 GM/DL (ref 14–18)
INR PPP: 1.9 (ref 0–1.3)
LYMPHOCYTES # BLD AUTO: 0.8 X10(3)/MCL (ref 0.6–4.6)
LYMPHOCYTES NFR BLD AUTO: 8 %
MCH RBC QN AUTO: 26.1 PG (ref 27–31)
MCHC RBC AUTO-ENTMCNC: 28.8 GM/DL (ref 33–36)
MCV RBC AUTO: 90.7 FL (ref 80–94)
MONOCYTES # BLD AUTO: 1.5 X10(3)/MCL (ref 0.1–1.3)
MONOCYTES NFR BLD AUTO: 14 %
NEUTROPHILS # BLD AUTO: 7.11 X10(3)/MCL (ref 2.1–9.2)
NEUTROPHILS NFR BLD AUTO: 65 %
PLATELET # BLD AUTO: 574 X10(3)/MCL (ref 130–400)
PMV BLD AUTO: 9.4 FL (ref 9.4–12.4)
POTASSIUM SERPL-SCNC: 4.1 MMOL/L (ref 3.5–5.1)
PROT SERPL-MCNC: 6.3 GM/DL (ref 5.8–7.6)
PROTHROMBIN TIME: 21.1 SECOND(S) (ref 12.5–14.5)
RBC # BLD AUTO: 3.75 X10(6)/MCL (ref 4.7–6.1)
SODIUM SERPL-SCNC: 139 MMOL/L (ref 136–145)
WBC # SPEC AUTO: 11 X10(3)/MCL (ref 4.5–11.5)

## 2021-12-27 ENCOUNTER — HISTORICAL (OUTPATIENT)
Dept: ADMINISTRATIVE | Facility: HOSPITAL | Age: 66
End: 2021-12-27

## 2021-12-27 LAB
INR PPP: 8 (ref 0–1.3)
PROTHROMBIN TIME: 66.2 SECOND(S) (ref 12.5–14.5)

## 2022-01-02 ENCOUNTER — HISTORICAL (OUTPATIENT)
Dept: LAB | Facility: HOSPITAL | Age: 67
End: 2022-01-02

## 2022-01-02 LAB
INR PPP: 1.6 (ref 2–3)
PROTHROMBIN TIME: 18.3 SEC (ref 11.7–14.5)

## 2022-01-10 ENCOUNTER — HISTORICAL (OUTPATIENT)
Dept: ADMINISTRATIVE | Facility: HOSPITAL | Age: 67
End: 2022-01-10

## 2022-01-10 LAB
ABS NEUT (OLG): 10.16 X10(3)/MCL (ref 2.1–9.2)
ALBUMIN SERPL-MCNC: 3.4 GM/DL (ref 3.4–4.8)
ALBUMIN/GLOB SERPL: 0.9 RATIO (ref 1.1–2)
ALP SERPL-CCNC: 100 UNIT/L (ref 40–150)
ALT SERPL-CCNC: 21 UNIT/L (ref 0–55)
AST SERPL-CCNC: 34 UNIT/L (ref 5–34)
BASOPHILS # BLD AUTO: 0.1 X10(3)/MCL (ref 0–0.2)
BASOPHILS NFR BLD AUTO: 1 %
BILIRUB SERPL-MCNC: 0.9 MG/DL
BILIRUBIN DIRECT+TOT PNL SERPL-MCNC: 0.4 MG/DL (ref 0–0.5)
BILIRUBIN DIRECT+TOT PNL SERPL-MCNC: 0.5 MG/DL (ref 0–0.8)
BUN SERPL-MCNC: 23 MG/DL (ref 8.4–25.7)
CALCIUM SERPL-MCNC: 9.3 MG/DL (ref 8.7–10.5)
CHLORIDE SERPL-SCNC: 79 MMOL/L (ref 98–107)
CO2 SERPL-SCNC: 35 MMOL/L (ref 23–31)
CREAT SERPL-MCNC: 0.85 MG/DL (ref 0.73–1.18)
DIGOXIN SERPL-MCNC: 0.5 NG/ML (ref 0.8–2)
EOSINOPHIL # BLD AUTO: 0.2 X10(3)/MCL (ref 0–0.9)
EOSINOPHIL NFR BLD AUTO: 2 %
ERYTHROCYTE [DISTWIDTH] IN BLOOD BY AUTOMATED COUNT: 18 % (ref 11.5–17)
EST. AVERAGE GLUCOSE BLD GHB EST-MCNC: 139.8 MG/DL
GLOBULIN SER-MCNC: 3.8 GM/DL (ref 2.4–3.5)
GLUCOSE SERPL-MCNC: 141 MG/DL (ref 82–115)
HBA1C MFR BLD: 6.5 %
HCT VFR BLD AUTO: 38 % (ref 42–52)
HGB BLD-MCNC: 11.3 GM/DL (ref 14–18)
IMM GRANULOCYTES # BLD AUTO: 0.06 10*3/UL
IMM GRANULOCYTES NFR BLD AUTO: 0 %
INR PPP: 1.6 (ref 0–1.3)
LYMPHOCYTES # BLD AUTO: 0.7 X10(3)/MCL (ref 0.6–4.6)
LYMPHOCYTES NFR BLD AUTO: 5 %
MCH RBC QN AUTO: 24.6 PG (ref 27–31)
MCHC RBC AUTO-ENTMCNC: 29.7 GM/DL (ref 33–36)
MCV RBC AUTO: 82.8 FL (ref 80–94)
MONOCYTES # BLD AUTO: 2 X10(3)/MCL (ref 0.1–1.3)
MONOCYTES NFR BLD AUTO: 15 %
NEUTROPHILS # BLD AUTO: 10.16 X10(3)/MCL (ref 2.1–9.2)
NEUTROPHILS NFR BLD AUTO: 76 %
PLATELET # BLD AUTO: 738 X10(3)/MCL (ref 130–400)
PMV BLD AUTO: 10 FL (ref 9.4–12.4)
POTASSIUM SERPL-SCNC: 3.9 MMOL/L (ref 3.5–5.1)
PROT SERPL-MCNC: 7.2 GM/DL (ref 5.8–7.6)
PROTHROMBIN TIME: 18.8 SECOND(S) (ref 12.5–14.5)
RBC # BLD AUTO: 4.59 X10(6)/MCL (ref 4.7–6.1)
SODIUM SERPL-SCNC: 134 MMOL/L (ref 136–145)
TSH SERPL-ACNC: 23.88 UIU/ML (ref 0.35–4.94)
URATE SERPL-MCNC: 7.5 MG/DL (ref 3.5–7.2)
WBC # SPEC AUTO: 13.3 X10(3)/MCL (ref 4.5–11.5)

## 2022-01-20 ENCOUNTER — HISTORICAL (OUTPATIENT)
Dept: CARDIOLOGY | Facility: HOSPITAL | Age: 67
End: 2022-01-20

## 2022-02-10 ENCOUNTER — HISTORICAL (OUTPATIENT)
Dept: CARDIOLOGY | Facility: HOSPITAL | Age: 67
End: 2022-02-10

## 2022-02-10 LAB
INR PPP: 5.6 (ref 0–1.3)
PROTHROMBIN TIME: 50.3 S (ref 12.5–14.5)

## 2022-02-14 ENCOUNTER — HISTORICAL (OUTPATIENT)
Dept: CARDIOLOGY | Facility: HOSPITAL | Age: 67
End: 2022-02-14

## 2022-02-17 ENCOUNTER — HISTORICAL (OUTPATIENT)
Dept: CARDIOLOGY | Facility: HOSPITAL | Age: 67
End: 2022-02-17

## 2022-02-21 ENCOUNTER — HISTORICAL (OUTPATIENT)
Dept: CARDIOLOGY | Facility: HOSPITAL | Age: 67
End: 2022-02-21

## 2022-03-03 ENCOUNTER — HISTORICAL (OUTPATIENT)
Dept: CARDIOLOGY | Facility: HOSPITAL | Age: 67
End: 2022-03-03

## 2022-03-31 ENCOUNTER — HISTORICAL (OUTPATIENT)
Dept: CARDIOLOGY | Facility: HOSPITAL | Age: 67
End: 2022-03-31

## 2022-04-10 ENCOUNTER — HISTORICAL (OUTPATIENT)
Dept: ADMINISTRATIVE | Facility: HOSPITAL | Age: 67
End: 2022-04-10
Payer: MEDICARE

## 2022-04-12 ENCOUNTER — HISTORICAL (OUTPATIENT)
Dept: CARDIOLOGY | Facility: HOSPITAL | Age: 67
End: 2022-04-12

## 2022-04-26 ENCOUNTER — HISTORICAL (OUTPATIENT)
Dept: RESPIRATORY THERAPY | Facility: HOSPITAL | Age: 67
End: 2022-04-26
Payer: MEDICARE

## 2022-04-28 VITALS
WEIGHT: 178 LBS | HEIGHT: 74 IN | OXYGEN SATURATION: 96 % | DIASTOLIC BLOOD PRESSURE: 70 MMHG | SYSTOLIC BLOOD PRESSURE: 128 MMHG | BODY MASS INDEX: 22.84 KG/M2

## 2022-05-04 NOTE — HISTORICAL [facility] CERNER
This is a historical note converted from Pop. Formatting and pictures may have been removed.  Please reference Pop for original formatting and attached multimedia. Chief Complaint  NP: Here for RT hip and lumbar pain, states no dscomfort during the day unless sitting for more than two hours. Pain is most severe at night when sleeping on RT side, states if he rolls over the pain will decrease. Pain is dull,aching,sharp..  History of Present Illness  Patient has?back pain for many years and occasional right?lateral hip?pain. ?He has no groin pain. ?He has no limitation of motion in his hip.? He has no numbness or tingling.? No bowel or bladder dysfunction.  Review of Systems  Systemic: No fever, no chills, and no recent weight change.  Head: No headache - frequent.  Eyes: No vision problems.  Otolarnygeal: No hearing loss, no earache, no epistaxis, no hoarseness, and no tooth pain. Gums normal.  Cardiovascular: No chest pain or discomfort and no palpitations.  Pulmonary: No pulmonary symptoms - difficulty sleeping, no dyspnea, and cough not worse in the morning.  Gastrointestinal: Appetite not decreased. No dysphagia and no constant eructation. No nausea, no vomiting, no abdominal pain, no hematochezia, and no loose/mushy stools - frequent. No constipation - frequent.  Genitourinary: No genitourinary symptoms - Getting up every night to urinate and no increase in urinary frequency. No urinary hesitancy. No urinary loss of control - difficulty stopping urination and no burning sensation during urination.  Musculoskeletal: No calf muscle cramps and no localized soft tissue swelling of the ankle.  Neurological: No fainting and no convulsions.  Psychological: Not feeling nervous tension, not feeling nervous from exhaustion, and no depression.  Skin: No rash. Previous history of no ulcers.  Physical Exam  Vitals & Measurements  T:?97? ?F (Oral)? HR:?94(Peripheral)? BP:?126/79?  HT:?189.00?cm? WT:?98.000?kg?  BMI:?27.43?  Lumbar exam:  Tenderness in the right iliolumbar region  No midline tenderness  No left-sided tenderness  Tenderness?mildly in the right sciatic notch  Tender to palpation right greater trochanteric bursa  No gluteal atrophy?on the left  Mild gluteal atrophy on the right  Mild weakness noted on Trendelenburg testing?of the right gluteus musculature compared to the left  Normal gait  Mild quad atrophy  Full active and passive range of motion of both hips with normal symmetry?and no tenderness  2+ dorsal pedal pulses  Intact sensation  Intact motor function grossly  1 out of 4 right?knee kick?reflex  2 out of 4 left knee kick reflex  1 out of 4 right ankle jerk reflex  2 out of 4 left ankle jerk reflex  Lumbar spine radiographs consistent with?mild degenerative disc disease in multiple levels?with?facet arthropathy?at L4-5 and L5-S1.  Hip radiographs?consistent with normal cartilage spaces?with?no degenerative changes.? Patient does have some old calcification?versus old?avulsion of the anterior?inferior iliac spine on the right?hip. ?This is extra-articular and is not a source of his problems.  ?  After verbal consent and a sterile Betadine and alcohol prep the right hip greater trochanteric bursa was injected with 2 cc of corticosteroid and 2 cc of lidocaine. ?Patient tolerated procedure well with no complications.  Assessment/Plan  1.?Trochanteric bursitis of right hip?M70.61  ?PT ordered  Ordered:  Clinic Follow-up PRN, 01/27/21 10:54:00 CST, Future Order, Orthopaedics  Office/Outpatient Visit Level 3 New [id number] PC, Trochanteric bursitis of right hip  Lumbar facet arthropathy, [facility], 01/27/21 10:54:00 CST  ?  2.?Lumbar facet arthropathy?M47.816  ?PT ordered for lumbar program  Ordered:  Clinic Follow-up PRN, 01/27/21 10:54:00 CST, Future Order, Orthopaedics  Office/Outpatient Visit Level 3 New [id number] PC, Trochanteric bursitis of right hip  Lumbar facet arthropathy, Orthopaedics  Clinic, 01/27/21 10:54:00 CST  ?  Referrals  Clinic Follow-up PRN, 01/27/21 10:54:00 CST, Future Order, [facility]  PT/OT External Referral, 01/27/21 10:56:00 CST, Trochanteric bursitis of right hip  Lumbar facet arthropathy, Home Exercise program  Therapeutic Excercise  No Dry Needling, 3 X Week, Patient has IV, Standard Precautions, **LUMBAR PT ORDERS**   Problem List/Past Medical History  Ongoing  Anticoagulants causing adverse effect in therapeutic use  Aortic valve replacement and aortoplasty  Artificial heart valve replacement  Cataract  Heart disease  Hodgkins lymphoma  HTN  Lumbar facet arthropathy  Sleep apnea  SVT - Supraventricular tachycardia  Trochanteric bursitis of right hip  Valvular heart disease  Historical  A-Fib  Diabetes  Lymphedema  Procedure/Surgical History  26448 - LT CATARACT W/IOL 1 STA PHACO (Left) (05/21/2019)  Extracapsular cataract removal with insertion of intraocular lens prosthesis (1 stage procedure), manual or mechanical technique (eg, irrigation and aspiration or phacoemulsification) (05/21/2019)  Replacement of Left Lens with Synthetic Substitute, Percutaneous Approach (05/21/2019)  61710 - RT CATARACT W/IOL 1 STA PHACO (Right) (04/16/2019)  Extracapsular cataract removal with insertion of intraocular lens prosthesis (1 stage procedure), manual or mechanical technique (eg, irrigation and aspiration or phacoemulsification) (04/16/2019)  Replacement of Right Lens with Synthetic Substitute, Percutaneous Approach (04/16/2019)  Laryngoscopy and other tracheoscopy (03/11/2012)  Other incision of soft tissue (03/08/2012)  Reopening of wound of thyroid field (03/08/2012)  Angiogram  Colonoscopy  Pleuredesis  Replacement, mitral valve, with cardiopulmonary bypass  Thyroidectomy, total or complete   Medications  allopurinol 300 mg oral tablet, 300 mg= 1 tab(s), Oral, Daily  Amoxil 500 mg Cap, 2000 mg= 4 cap(s), Oral, TID,? ?Not Taking, Completed Rx  aspirin 81 mg oral tablet, 81  mg= 1 tab(s), Oral, Daily  chromium picolinate, Daily  clobetasol 0.05% topical cream, TOP, BID  colchicine 0.6 mg oral capsule, 0.6 mg= 1 cap(s), Oral, BID  Coreg 6.25 mg oral tablet, 6.25 mg= 1 tab(s), Oral, BID  furosemide 20 mg oral tablet, 20 mg= 1 tab(s), Oral, Daily  metformin 500 mg oral tablet, 500 mg= 1 tab(s), Oral, Daily  Multaq 400 mg oral tablet, Oral, BID  Multiple Vitamins with Minerals oral tablet, chewable, Daily  simvastatin 40 mg oral tablet, 40 mg= 1 tab(s), Oral, Once a day (at bedtime)  spironolactone 50 mg oral tablet, 50 mg= 1 tab(s), Oral, Once  Synthroid 125 mcg (0.125 mg) oral tablet, 125 mcg= 1 tab(s), Oral, Daily  Vitamin D 1000 intl units oral tablet, 2 tab(s), Daily  warfarin 5 mg oral tablet, Oral, Daily  Allergies  No Known Allergies  Social History  Alcohol - Denies Alcohol Use, 03/09/2012  Current, 1-2 times per week, 01/27/2021  Substance Use - Denies Substance Abuse, 03/09/2012  Tobacco - Denies Tobacco Use, 03/09/2012  Never (less than 100 in lifetime), N/A, 01/27/2021  Health Maintenance  Health Maintenance  ???Pending?(in the next year)  ??? ??OverDue  ??? ? ? ?ADL Screening due??03/09/13??and every 1??year(s)  ??? ? ? ?HF-LVEF due??05/07/20??and every 1??year(s)  ??? ? ? ?Influenza Vaccine due??10/01/20??and every 1??day(s)  ??? ??Due?  ??? ? ? ?Colorectal Screening due??01/27/21??and every 10??year(s)  ??? ? ? ?Diabetes Maintenance-Eye Exam due??01/27/21??Unknown Frequency  ??? ? ? ?Diabetes Maintenance-Foot Exam due??01/27/21??Unknown Frequency  ??? ? ? ?Diabetes Maintenance-Microalbumin due??01/27/21??Unknown Frequency  ??? ? ? ?Medicare Annual Wellness Exam due??01/27/21??and every 1??year(s)  ??? ? ? ?Pneumococcal Vaccine due??01/27/21??Unknown Frequency  ??? ? ? ?Tetanus Vaccine due??01/27/21??and every 10??year(s)  ??? ? ? ?Zoster Vaccine due??01/27/21??Unknown Frequency  ??? ??Due In Future?  ??? ? ? ?Diabetes Maintenance-HgbA1c not due until??10/15/21??and every  1??year(s)  ??? ? ? ?Hypertension Management-BMP not due until??10/15/21??and every 1??year(s)  ??? ? ? ?Obesity Screening not due until??01/01/22??and every 1??year(s)  ??? ? ? ?Advance Directive not due until??01/02/22??and every 1??year(s)  ??? ? ? ?Alcohol Misuse Screening not due until??01/02/22??and every 1??year(s)  ??? ? ? ?Cognitive Screening not due until??01/02/22??and every 1??year(s)  ??? ? ? ?Fall Risk Assessment not due until??01/02/22??and every 1??year(s)  ??? ? ? ?Functional Assessment not due until??01/02/22??and every 1??year(s)  ??? ? ? ?HF-Heart Failure Education not due until??01/21/22??and every 1??year(s)  ???Satisfied?(in the past 1 year)  ??? ??Satisfied?  ??? ? ? ?Advance Directive on??01/27/21.??Satisfied by Aleisha Pena  ??? ? ? ?Alcohol Misuse Screening on??01/27/21.??Satisfied by Aleisha Pena  ??? ? ? ?Aspirin Therapy for CVD Prevention on??01/27/21.??Satisfied by Aleisha Pena  ??? ? ? ?Blood Pressure Screening on??01/27/21.??Satisfied by Aleisha Pena  ??? ? ? ?Body Mass Index Check on??01/27/21.??Satisfied by Aleisha Pena  ??? ? ? ?Cognitive Screening on??01/27/21.??Satisfied by Aleisha Pena  ??? ? ? ?Depression Screening on??01/27/21.??Satisfied by Aleisha Pena  ??? ? ? ?Diabetes Maintenance-HgbA1c on??10/15/20.??Satisfied by Nica Torres  ??? ? ? ?Diabetes Screening on??10/15/20.??Satisfied by Nica Torres  ??? ? ? ?Fall Risk Assessment on??01/27/21.??Satisfied by Aleisha Pena  ??? ? ? ?Functional Assessment on??01/27/21.??Satisfied by Aleisha Pena  ??? ? ? ?Hypertension Management-Blood Pressure on??01/27/21.??Satisfied by Aleisha Pena  ??? ? ? ?Influenza Vaccine on??01/27/21.??Satisfied by Aleisha Pena??Reason: Expectation Satisfied Elsewhere  ??? ? ? ?Lipid Screening on??10/15/20.??Satisfied by Nica Torres  ??? ? ? ?Obesity Screening on??01/27/21.??Satisfied by Aleisha Pena  [place]  ?

## 2022-05-10 ENCOUNTER — ANTI-COAG VISIT (OUTPATIENT)
Dept: CARDIOLOGY | Facility: HOSPITAL | Age: 67
End: 2022-05-10
Payer: MEDICARE

## 2022-05-10 DIAGNOSIS — Z95.2 H/O MECHANICAL AORTIC VALVE REPLACEMENT: Primary | ICD-10-CM

## 2022-05-10 LAB
CTP QC/QA: YES
INR PPP: 4.8 (ref 2–3)
PROTHROMBIN TIME, POC: 57.1 (ref 2–3)

## 2022-05-10 PROCEDURE — 85610 PROTHROMBIN TIME: CPT

## 2022-05-10 PROCEDURE — 99211 OFF/OP EST MAY X REQ PHY/QHP: CPT

## 2022-05-10 PROCEDURE — 85610 PROTHROMBIN TIME: CPT | Mod: 91

## 2022-05-17 ENCOUNTER — ANTI-COAG VISIT (OUTPATIENT)
Dept: CARDIOLOGY | Facility: HOSPITAL | Age: 67
End: 2022-05-17
Payer: MEDICARE

## 2022-05-17 DIAGNOSIS — Z95.2 H/O MECHANICAL AORTIC VALVE REPLACEMENT: Primary | ICD-10-CM

## 2022-05-17 LAB
CTP QC/QA: YES
INR PPP: 3.3 (ref 2–3)
PROTHROMBIN TIME, POC: 39.6 (ref 2–3)

## 2022-05-17 PROCEDURE — 85610 PROTHROMBIN TIME: CPT

## 2022-05-20 NOTE — HISTORICAL [facility]ER
This is a historical note converted from Pop. Formatting and pictures may have been removed.  Please reference Pop for original formatting and attached multimedia. Chief Complaint  CAD s/p emergent left main PCI?; cardiogenic shock requiring IABP x4 days  Reason for Consultation  Physiatry  History of Present Illness  Admit MD: Genaro Oviedo MD  Consult Physiatry: Isai Barber MD  HPI: 65yo WM with PMH of?history of AVR s/p mechanical AVR in 2006 and CAD s/p CABG x1 in 2006, paroxysmal atrial fibrillation, DM type II, and COPD presented to cardiology office on 10/14 with c/o SOB.?Noted to have an abnormal stress test on 8/12 with cardiology F/U recommended by Pulmonology.??LHC on 10/21 significant for critical left main stenosis at 95%.???Recent LexiPET demonstrated anterior and anterior septal ischemia.?CV surgery consulted and planned on CABG on 10/25. ?On 10/24 patient had sudden decompensation requiring dobutamine and IV nitroglycerin. ?LVEF significant for 25-30% with anterior septal and hypokinesis also with severe pulmonary hypertension. ?Tolerated emergent LHC with successful PCI to left main on 10/24. ?Remained ventilated on Levophed, ?dobutamine, Precedex and heparin drip. ?Remained on IABP. ?Dobutamine discontinued on 10/26 due to tachycardia. ?Primacor initiated. ?Sputum culture significant for staph aureus and Rocephin and doxycycline initiated. ?Balloon pump successfully removed on 10/27. ?Remained hypotensive requiring Levophed. ?Intraoperative RVR reported on 10/28 requiring amiodarone bolus and drip. ?Extubated on 10/28. ?XI on 10/28 with significant improvement of EF 40-45%. ?Failed MBS study and NG tube placement attempted x2, but was unable to advance. ?Patient started to have significant epistaxis with increased oxygen requirements. ?ENT initiated Afrin but patient continued with significant bleeding ultimately requiring intubation and requiring IVF bolus secondary to hypotension.  ?Phenylephrine drip initiated. ?ENT evaluated on 10/29-tolerated cauterization of nose and recommended 2 squirts of Afrin bilaterally for the next 3 to 5 days. ?Heparin drip discontinued but remains on aspirin and Plavix. ?Respiratory culture grew out MSSA on 10/29. ?Rocephin and doxycycline continued. ?On 10/30 patient again had an acute epistaxis episode, ENT placed nasal packing. ?NG tube pulled on 10/31 and OG tube placed per ENT. ?Extubated on 10/31. ?On 11/1 nasal packing removed, but quickly developed persistent right-sided epi taxis. ?ENT placed Surgicel into the right nasal cavity followed by a small piece of nasal support. ?Reinflated with Afrin. ?The right nasal packing exchanged absorbable type of packing on the right. ?ENT reported no need for prophylactic antibiotics for this packing. ?ENT reported there was no need for removal of packing to right nasal cavity. ?Antibiotic therapy discontinued on 11/3. ?Lasix discontinued on 11/3. ?Failed MBS again on 11/4. ?Clinimix initiated. ?Coumadin resumed on 11/5. ?Again with bleeding on 11/6. ?ENT initiated Rhino Rocket and Merocel nonabsorbable pack on 11/6. ?Recommended removing on 11/11. ?Heparin drip discontinued on 11/7 due to recurrent epistaxis. ?Cardiology recommended continuing Plavix until 11/24 and then continuing aspirin and Coumadin thereafter. ?Cardiology recommended reducing INR target from 2.5-3.5 down to 2.0-3.0 as patient has mechanical AV and at increased risk of bleeding. ?Merocel and Right Rhino Rocket removed by ENT on 11/11. ?Tolerated PEG placement on 11/12 without periprocedural complications. ?Continued Coumadin 5 mg daily with 7.5 mg weekly on Tuesdays. ?Modified INR goal of 1.8-2.5 while on Plavix per cardiology. ?There is still appears to be packing in the right nasal cavity. ?ENT did not reevaluate. ?Keflex 250 mg 3 times daily initiated on 11/17 for prophylaxis of foreign body. ?Hospital team recommended leaving packing alone  until Plavix discontinued on 11/24. Participating in therapy. Functional status including bed mobility, UE/LE dressing, toileting, and Amb w/RW for 125ft requiring minimal assistance. Patient was evaluated, accepted, and admitted to inpatient rehab to improve functional status. Transferred to [facility] on 11/17 without incident.??  11/18: Seen in patient room, seated in WC with head hanging and napping. Easily aroused. Reports good sleep overnight although he does feel like he can rest between therapy sessions. Motivated. Tells me that he is kind of surprised that he has been able to sleep on his back this long because of his arthritic back pain. It has not been bothering him. Discussed Lidoderm patches and Biofreeze if needed. Reports tube feedings keeping his appetite satisfied but he is hopeful to be able to eat again. He has been thinking of Stephane dinner fairly often. Bowels moving yesterday. Denies loose stools. Denies pain to nose, but tells me about having to have it packed twice and believes some of the 1st packing was left behind. Doctors told him they did not want to cauterize or mess with it further at this time to prevent further bleeding. Discussed follow-up ENT appointment at discharge with his ENT Genaro Anand. Also tells me that he plans to go to outpatient Cardio Rehab at discharge as he had good results before. Very pleasant. Denies complaints. VSSAF.  Review of Systems  Barriers to Discharge:  Social:Pt. has a Masters degree. He has no  history. 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 Children (1)  Medical:CAD s/p CABG in 2006 and emergent left main PCI on 10/24/2021,?Paroxysmal atrial fibrillation on Coumadin,?VHD s/p mechanical AVR in 2006 on Coumadin,?Oropharyngeal dysphagia,?DM type II,?Microcytic anemia,?Gout,?Hypothyroidism,?HLD,?GERD,?COPD?  Functional:  Prior Level of Fx: Independent ADLs,  drives, manages finances, and manages own meds. ?They hire someone for lawn maintenance. ?He does not have a medic alert.?  Residence: Pt. lives with his spouse in [place] in a single-story home with 1 step no rails to enter the residence. ?He uses a tub/shower combination with no grab bars or HHS. ?He does not have an elevated toilet. ?No grab bars adjacent.?  DME: He has a CPAP but doesnt use it.  Psychiatric:? Hx mental health/substance abuse: Pt. has no history of mental health or substance abuse issues.?  Depression/Anxiety: no complaints?????  Pain:?denies???  acetaminophen 325 mg oral tablet)650 mg, form: Tab, Oral, q6hr PRN for pain  Bowels/Bladder: last BM 11/17 per patient?? ????  Appetite: Tolerating tube feeds. thinking about enjoying a po diet again???? ? ?  Sleep:??good? ???  ?   ???  ?  Physical Exam  Vitals & Measurements  T:?36.5? ?C (Oral)? TMIN:?36.4? ?C (Oral)? TMAX:?36.5? ?C (Oral)? HR:?71(Peripheral)? RR:?19? BP:?119/78? SpO2:?91%?  General:?well-developed, well-nourished, in no acute distress  Eye: EOMI, clear conjunctiva, eyelids normal  HENT:?normocephalic,??oropharynx and nasal mucosal surfaces moist, right nasal packing in place-clear (pink tinge) drainage  Neck: full range of motion, supple  Respiratory:?equal chest rise, no SOB, no audible wheeze  Cardiovascular:?regular rhythm, increased rate, ?no edema  Gastrointestinal:?soft, non-tender, non-distended, PEG- LUQ  Musculoskeletal:?full range of motion of all extremities/spine with generalized weakness  Integumentary: no rashes or skin lesions present, old sternal scar-healed, PEG intact, small scab to posterior right shoulder-healing? ?  Neurologic: cranial nerves intact, no signs of peripheral neurological deficit, motor/sensory function intact  ?*MD performed and documented physical examination ??  Assessment/Plan  1.?S/P PTCA (percutaneous transluminal coronary angioplasty)?Z98.61  2.?Dysphagia?R13.10  3.?CAD (coronary artery  disease)?I25.10  4.?S/P CABG (coronary artery bypass graft)?Z95.1  5.?Atrial fibrillation?I48.91  6.?Valvular heart disease?I38  7.?History of mechanical heart valve replacement?Z95.2  8.?DM - Diabetes mellitus?E11.9  9.?HTN?I10  10.?HLD - Hyperlipidemia?E78.5  11.?COPD - Chronic obstructive pulmonary disease?J44.9  12.?Asthma?J45.909  13.?Hodgkins lymphoma?C81.90  14.?Sleep apnea?G47.30  15.?Gout?M10.9  16.?GERD (gastroesophageal reflux disease)?K21.9  17.?Cataract?H26.9  Wounds:?PEG-intact; small scab to posterior right shoulder-healing? ?  Precautions:?fall risk?? ? ?  Bracing/AD:?RW??? ?  Swallowing: NPO/Tube feedings. Dietician following???  Function: Tolerating therapy. Continue PT/OT/RT  VTE Prophylaxis:  warfarin 5 mg, form: Tab, Oral, Daily  warfarin (Coumadin)2.5 mg, form: Tab, Oral, qTuesday  Code Status:?FULL CODE?????  Discharge:?Pt. 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 Children (1). Date pending.  dos 11/19/21  I have?seen and examined patient?in initial Physiatry consult  case & medical records reviewed  I have done and signed? prescreen  Admit?history and PE?completed and reviewed and are consistent with findings on prescreen  I have reviewed case with Elvira Starkey NP  Medical management by Dr Oviedo and his NPs- I have discussed case with them  PT,OT,ST,RT evaluations ordered and in progress  Med Reconciliation completed and reviewed in EHR  I?have discussed? expected course with patient  Patient remains appropriate for, in need of, should tolerate and benefit from IRF  Kain Barber MD  * I was involved in the creation of this note with Elvira Starkey NP  ?  ?  ?  ?  ?  ?   Roxie Starkey?NP, conducted additional independent physical examination and assisted with medical documentation.?????   Problem List/Past  Medical History  Ongoing  Anticoagulants causing adverse effect in therapeutic use  Aortic valve replacement and aortoplasty  Artificial heart valve replacement  Asthma  Atrial fibrillation  Bronchiectasis  Cataract  COPD - Chronic obstructive pulmonary disease  COVID-19 immunization  DM - Diabetes mellitus  Gout  Heart disease  HLD - Hyperlipidemia  Hodgkins lymphoma  HTN  Hypothyroid  Lumbar facet arthropathy  Restrictive lung disease  S/P CABG (coronary artery bypass graft)  Sleep apnea  SVT - Supraventricular tachycardia  Tachycardia  Trochanteric bursitis of right hip  Valvular heart disease  Historical  A-Fib  Asthma  COPD - Chronic obstructive pulmonary disease  Diabetes  Hypothyroid  Lymphedema  Procedure/Surgical History  Insertion of Feeding Device into Stomach, Percutaneous Approach (11/12/2021)  PEG Tube Insertion Initial (11/12/2021)  Packing of Nasal Region using Packing Material (11/06/2021)  Packing of Nasal Region using Packing Material (11/01/2021)  Packing of Nasal Region using Packing Material (10/30/2021)  Control Bleeding in Nasal Mucosa and Soft Tissue, Via Natural or Artificial Opening Endoscopic (10/29/2021)  Insertion of Endotracheal Airway into Trachea, Via Natural or Artificial Opening (10/29/2021)  Insertion of Feeding Device into Stomach, Via Natural or Artificial Opening (10/29/2021)  Respiratory Ventilation, 24-96 Consecutive Hours (10/29/2021)  Restoration of Cardiac Rhythm, Single (10/28/2021)  Ultrasonography of Right and Left Heart, Transesophageal (10/28/2021)  Assistance with Cardiac Output using Balloon Pump, Continuous (10/24/2021)  Dilation of Coronary Artery, One Artery with Drug-eluting Intraluminal Device, Percutaneous Approach (10/24/2021)  Insertion of Endotracheal Airway into Trachea, Via Natural or Artificial Opening (10/24/2021)  Respiratory Ventilation, 24-96 Consecutive Hours (10/24/2021)  Assistance with Cardiac Output using Balloon Pump, Continuous  (10/21/2021)  Fluoroscopy of Multiple Coronary Arteries using Low Osmolar Contrast (10/21/2021)  Fluoroscopy of Right Internal Mammary Bypass Graft using Low Osmolar Contrast (10/21/2021)  Measurement of Cardiac Sampling and Pressure, Left Heart, Percutaneous Approach (10/21/2021)  57556 - LT CATARACT W/IOL 1 STA PHACO (Left) (05/21/2019)  Extracapsular cataract removal with insertion of intraocular lens prosthesis (1 stage procedure), manual or mechanical technique (eg, irrigation and aspiration or phacoemulsification) (05/21/2019)  Replacement of Left Lens with Synthetic Substitute, Percutaneous Approach (05/21/2019)  18788 - RT CATARACT W/IOL 1 STA PHACO (Right) (04/16/2019)  Extracapsular cataract removal with insertion of intraocular lens prosthesis (1 stage procedure), manual or mechanical technique (eg, irrigation and aspiration or phacoemulsification) (04/16/2019)  Replacement of Right Lens with Synthetic Substitute, Percutaneous Approach (04/16/2019)  Laryngoscopy and other tracheoscopy (03/11/2012)  Other incision of soft tissue (03/08/2012)  Reopening of wound of thyroid field (03/08/2012)  Angiogram  CABG - Coronary artery bypass graft  Cardioversion  Colonoscopy  Pleuredesis  Replacement, mitral valve, with cardiopulmonary bypass  Thyroidectomy, total or complete   Medications  Inpatient  acetaminophen 325 mg oral tablet, 325 mg= 1 tab(s), Oral, q4hr, PRN  acetaminophen 325 mg oral tablet, 650 mg= 2 tab(s), Oral, q6hr, PRN  allopurinol 300 mg oral tablet, 300 mg= 1 tab(s), Oral, Daily  amiodarone 200 mg oral Tab, 200 mg= 1 tab(s), Oral, Daily  aspirin 81 mg oral tablet, CHEWABLE, 81 mg= 1 tab(s), Oral, Daily  atorvastatin 40 mg oral tablet, 40 mg= 1 tab(s), Oral, Daily  Colace 100 mg oral capsule, 100 mg= 1 cap(s), Oral, BID  Coumadin, 2.5 mg= 1 tab(s), Oral, qTuesday  Coumadin, 2.5 mg= 1 tab(s), Oral, Once  Dulcolax Laxative 10 mg RECTAL suppository, 10 mg= 1 supp, UT (rectal), q3day, PRN  hydrALAZINE,  10 mg= 0.5 mL, IV Push, q4hr, PRN  Keflex, 250 mg= 1 cap(s), Oral, TID  labetalol, 10 mg= 2 mL, IV Push, q10min, PRN  lactulose 10 g/15 mL oral syrup, 20 gm= 30 mL, Oral, Every other day, PRN  Lopressor 1 mg/mL injectable solution, 10 mg= 10 mL, IV Push, q2hr, PRN  metoprolol tartrate 25 mg oral tab, 25 mg= 1 tab(s), Oral, BID  nitroglycerin 0.4 mg sublingual TAB, 0.4 mg= 1 tab(s), SL, q5min, PRN  ondansetron 4 mg oral tablet, disintegrating, 8 mg= 2 tab(s), Oral, q8hr, PRN  Pepcid 20 mg oral tablet, 20 mg= 1 tab(s), Oral, BID  Plavix 75 mg oral tablet, 75 mg= 1 tab(s), Oral, Daily  Synthroid 125 mcg (0.125 mg) oral tablet, 125 mcg= 1 tab(s), Oral, Daily  Tessalon Perles, 100 mg= 1 cap(s), Oral, TID, PRN  Vistaril, 50 mg= 1 cap(s), Oral, At Bedtime, PRN  Vitamin D 1,000 Units Tab, 2000 units= 2 tab(s), PEG Tube, Daily  warfarin, 5 mg= 1 tab(s), Oral, Daily  Zofran 2 mg/mL injectable solution, 4 mg= 2 mL, IV Push, q4hr, PRN  Home  albuterol 0.083% inhalation solution, 2.5 mg= 3 mL, INH, q6hr, 11 refills  allopurinol 300 mg oral tablet, 300 mg= 1 tab(s), Oral, Daily  amiodarone 200 mg oral Tab, 200 mg= 1 tab(s), Oral, Daily  atorvastatin 40 mg oral tablet, 40 mg= 1 tab(s), Oral, Daily  metoprolol tartrate 25 mg oral tab, 25 mg= 1 tab(s), Oral, BID  Pepcid 20 mg oral tablet, 20 mg= 1 tab(s), Oral, BID  Plavix 75 mg oral tablet, 75 mg= 1 tab(s), Oral, Daily  Synthroid 125 mcg (0.125 mg) oral tablet, 125 mcg= 1 tab(s), Oral, Daily  Vitamin D 1000 intl units oral tablet, 2 tab(s), Daily  warfarin 5 mg oral tablet, Oral, Daily  Allergies  No Known Allergies  Social History  Abuse/Neglect  No, No, Yes, 11/17/2021  No, 11/12/2021  No, 10/05/2021  No, 07/22/2021  Alcohol - Denies Alcohol Use, 03/09/2012  Current, 1-2 times per week, 10/21/2021  Current, 1-2 times per week, 01/27/2021  Spiritual/Cultural  Jain, 10/21/2021  Substance Use - Denies Substance Abuse, 03/09/2012  Never, 10/21/2021  Tobacco - Denies Tobacco  Use, 03/09/2012  Former smoker, quit more than 30 days ago, N/A, 11/17/2021  Former smoker, quit more than 30 days ago, No, 11/12/2021  Former smoker, quit more than 30 days ago, No, 10/05/2021  Former smoker, quit more than 30 days ago, No, 07/22/2021  Never (less than 100 in lifetime), N/A, 01/27/2021  Immunizations  Vaccine Date Status Comments   influenza virus vaccine, inactivated 10/05/2021 Given    COVID-19 mRNA, LNP-S, PF - Moderna 03/22/2021 Recorded [place]   COVID-19 mRNA, LNP-S, PF - Moderna 02/24/2021 Recorded [place]   pneumococcal 23-polyvalent vaccine 09/24/2014 Recorded    Lab Results  Test Name Test Result Date/Time   Sodium Lvl 138 mmol/L 11/18/2021 05:30 CST   Potassium Lvl 4.3 mmol/L 11/18/2021 05:30 CST   Chloride 102 mmol/L 11/18/2021 05:30 CST   CO2 28 mmol/L 11/18/2021 05:30 CST   Calcium Lvl 8.6 mg/dL (Low) 11/18/2021 05:30 CST   Glucose Lvl 128 mg/dL (High) 11/18/2021 05:30 CST   .8 mg/dL 11/18/2021 10:00 CST   BUN 15.9 mg/dL 11/18/2021 05:30 CST   Creatinine 0.78 mg/dL 11/18/2021 05:30 CST   Est Creat Clearance Ser 106.90 mL/min 11/18/2021 06:22 CST   eGFR-AA >60 11/18/2021 05:30 CST   eGFR-BAN >60 mL/min/1.73 m2 11/18/2021 05:30 CST   Bili Total 0.5 mg/dL 11/18/2021 05:30 CST   Bili Direct 0.3 mg/dL 11/18/2021 05:30 CST   Bili Indirect 0.20 mg/dL 11/18/2021 05:30 CST   AST 13 unit/L 11/18/2021 05:30 CST   ALT 11 unit/L 11/18/2021 05:30 CST   Alk Phos 73 unit/L 11/18/2021 05:30 CST   Total Protein 6.1 gm/dL 11/18/2021 05:30 CST   Albumin Lvl 2.9 gm/dL (Low) 11/18/2021 05:30 CST   Globulin 3.2 gm/dL 11/18/2021 05:30 CST   A/G Ratio 0.9 ratio (Low) 11/18/2021 05:30 CST   Hgb A1c 5.8 % 11/18/2021 10:00 CST   Prealbumin 19.6 mg/dL 11/18/2021 05:30 CST   WBC 10.1 x10(3)/mcL 11/18/2021 05:30 CST   RBC 3.39 x10(6)/mcL (Low) 11/18/2021 05:30 CST   Hgb 10.1 gm/dL (Low) 11/18/2021 05:30 CST   Hct 32.3 % (Low) 11/18/2021 05:30 CST   Platelet 543 x10(3)/mcL (High)  11/18/2021 05:30 CST   MCV 95.3 fL (High) 11/18/2021 05:30 CST   MCH 29.8 pg 11/18/2021 05:30 CST   MCHC 31.3 gm/dL (Low) 11/18/2021 05:30 CST   RDW 15.6 % 11/18/2021 05:30 CST   MPV 9.4 fL 11/18/2021 05:30 CST   Abs Neut 7.38 x10(3)/mcL 11/18/2021 05:30 CST   Neutro Auto 73.0 % 11/18/2021 05:30 CST   Lymph Auto 8.6 % (Low) 11/18/2021 05:30 CST   Mono Auto 13.8 % (High) 11/18/2021 05:30 CST   Eos Auto 3.0 % 11/18/2021 05:30 CST   Abs Eos 0.30 x10(3)/mcL 11/18/2021 05:30 CST   Basophil Auto 1.1 % (High) 11/18/2021 05:30 CST   Abs Neutro 7.38 x10(3)/mcL 11/18/2021 05:30 CST   Abs Lymph 0.87 x10(3)/mcL 11/18/2021 05:30 CST   Abs Mono 1.40 x10(3)/mcL (High) 11/18/2021 05:30 CST   Abs Baso 0.11 x10(3)/mcL 11/18/2021 05:30 CST   NRBC% 0.2 % 11/18/2021 05:30 CST   IG% 0.500 % (High) 11/18/2021 05:30 CST   IG# 0.0500 (High) 11/18/2021 05:30 CST   PT 15.5 sec (High) 11/18/2021 10:00 CST   INR 1.3 (Low) 11/18/2021 10:00 CST   Diagnostic Results  (10/21/2021 13:09 CDT XR Chest 1 View)  IMPRESSION:  LINES/TUBES: IABP tip projects over the expected location of the  proximal descending thoracic aorta.  Central vascular congestion and small right and trace left pleural  effusions.  No lobar consolidations or pneumothoraces.  Prior median sternotomy. Valvular ring in place. [1]  ?  (10/21/2021 13:53 CDT CT Thorax W/O Contrast)  Impression.  1. Dense vascular calcifications.  2. Small bilateral pleural effusions, left greater than right  3. Right upper lobe nodule not seen on prior. There is also increased  consolidation medial right upper lobe. Recommend a follow-up chest CT  in 3 months versus outpatient PET/CT. [2]  ?  (10/22/2021 11:03 CDT CT Angio Abd and Pelvis W/WO)  IMPRESSION  ???1. Relatively mild burden of scattered atherosclerotic calcification  through the aorta and bilateral iliac and femoral vasculature. No  suspicious focal vessel abnormality or flow-limiting stenosis is  appreciated.  ??2. Partially visualized  intra-aortic balloon, with the distal  radiodense marker positioned just inferior to the level of the renal  vessels.  ??3. Nonvisualization of the spleen. Scattered homogeneous soft tissue  nodules at the left upper quadrant may represent splenosis.  ??4. Mildly prominent scattered abdominopelvic lymph nodes are  nonspecific and needing close correlation with pertinent clinical  details in regard to possible causes for reactive adenopathy or more  sinister lymphoproliferative etiology.  ??5. Increased volume bilateral pleural effusions and right  pericardial fluid relative to the chest CT performed the previous day.  ??6. Multiple bilateral simple-appearing renal cysts.  ??7. Prostatomegaly. [3]  ?  RESPIRATORY CULTURE  Final - October 29, 2021 10:47 CDT -?  Moderate Staph aureus (methicillin sensitive)?  with normal respiratory [place]  Pre - October 28, 2021 8:09 CDT -?  Moderate Staphylococcus aureus?  with normal respiratory [place]  GS - October 27, 2021 7:14 CDT -?  Quality 3+ , Many Gram Positive Cocci , Few Gram Negative Rods , Few Gram Positive Rods  ?  (10/28/2021 05:01 CDT XR Chest 1 View)  FINDINGS/IMPRESSION:?  Endotracheal tube tip projects approximately 3.3 cm above the level of  the saurabh. Stable appearance of partially imaged enteric tube and  postsurgical changes of median sternotomy and cardiac valvular  prosthesis. Stable size and contour of the cardiomediastinal  silhouette. Scattered patchy bilateral airspace opacities are without  significant interval change. Slight interval increase in small volume  layering left pleural effusion. Stable trace right pleural effusion.  No new focal consolidative airspace opacities or discernible  pneumothoraces.  ?  (11/01/2021 09:05 CDT XR Chest 1 View)  IMPRESSION: Changes of pulmonary vascular congestion and cardiac  decompensation.  Bilateral pleural effusions.  Interval removal of endotracheal tube.  Reinsertion of a larger nasogastric tube [4]     [1]?XR  Chest 1 View; Abel HICKS, Ruthie Mclaughlin 10/21/2021 13:09 CDT  [2]?CT Thorax W/O Contrast; Roxana HICKS, Luis Miguel Baker 10/21/2021 13:53 CDT  [3]?CT Angio Abd and Pelvis W/WO; Lm Witt MD 10/22/2021 11:03 CDT  [4]?XR Chest 1 View; Aquiles Ly MD 11/01/2021 09:05 CDT

## 2022-06-10 ENCOUNTER — TELEPHONE (OUTPATIENT)
Dept: INTERNAL MEDICINE | Facility: CLINIC | Age: 67
End: 2022-06-10
Payer: MEDICARE

## 2022-06-10 NOTE — TELEPHONE ENCOUNTER
Noted.  Patient is encouraged to have adequate fluid hydration.  If he is leaking some fluid from the PEG tube site, he is encouraged to change the dressing.  This should not deter him from having adequate fluid intake or making the right nutritional choices.  He is discouraged from just snacking on sweets.  He may call office for any worsening of symptoms.

## 2022-06-10 NOTE — TELEPHONE ENCOUNTER
----- Message from Chichi Tong sent at 6/10/2022  9:23 AM CDT -----  Regarding: dizzy, elevated sugar, dehyrated  Patient left voicemail wanted to talk to you that he was dizzy, blood sugar was up and was dehydrated. He is better now.  His call back number is [phone]

## 2022-06-10 NOTE — TELEPHONE ENCOUNTER
PT HAD HIS PEG TUBE REMOVED Monday.   TOLD HIM FLUIDS WOULD PROBABLY FLOW OUT OF SITE FOR A WHILE.  THEY DID SO HE WAS NOT DRINKING A LOT.  INSTEAD EATING LOTS OFFRUIT.  HE GOT DIZZY AND FELL TO THE FLOOR.  NO INJURIES.  GLUCOSE .  STARTED DRINKING FLUIDS AND EATING ALL THOSE SWEETS.  FEELS BETTER NOW.  GLUCOSE   FYI

## 2022-06-14 ENCOUNTER — ANTI-COAG VISIT (OUTPATIENT)
Dept: CARDIOLOGY | Facility: HOSPITAL | Age: 67
End: 2022-06-14
Payer: MEDICARE

## 2022-06-14 DIAGNOSIS — Z95.2 H/O MECHANICAL AORTIC VALVE REPLACEMENT: Primary | ICD-10-CM

## 2022-06-14 LAB
CTP QC/QA: YES
INR PPP: 3.8 (ref 2–3)
PROTHROMBIN TIME, POC: 45.3 (ref 2–3)

## 2022-06-14 PROCEDURE — 85610 PROTHROMBIN TIME: CPT

## 2022-06-14 PROCEDURE — 99211 OFF/OP EST MAY X REQ PHY/QHP: CPT

## 2022-06-14 NOTE — PROGRESS NOTES
Pt was recently dehydrated d/t a peg tube removal.  He fell to his knees from dizziness with no injuries.  He will hold his warfarin now and the resume with dosage decrease.

## 2022-06-28 ENCOUNTER — ANTI-COAG VISIT (OUTPATIENT)
Dept: CARDIOLOGY | Facility: HOSPITAL | Age: 67
End: 2022-06-28
Payer: MEDICARE

## 2022-06-28 DIAGNOSIS — Z95.2 H/O MECHANICAL AORTIC VALVE REPLACEMENT: Primary | ICD-10-CM

## 2022-06-28 LAB
CTP QC/QA: YES
INR PPP: 4.8 (ref 2–3)
PROTHROMBIN TIME, POC: 57.8 (ref 2–3)

## 2022-06-28 PROCEDURE — 85610 PROTHROMBIN TIME: CPT

## 2022-07-05 ENCOUNTER — ANTI-COAG VISIT (OUTPATIENT)
Dept: CARDIOLOGY | Facility: HOSPITAL | Age: 67
End: 2022-07-05
Payer: MEDICARE

## 2022-07-05 DIAGNOSIS — Z95.2 H/O MECHANICAL AORTIC VALVE REPLACEMENT: Primary | ICD-10-CM

## 2022-07-05 LAB
INR BLD: 4.17 (ref 0–1.3)
PROTHROMBIN TIME: 39.3 SECONDS (ref 12.5–14.5)

## 2022-07-05 PROCEDURE — 36415 COLL VENOUS BLD VENIPUNCTURE: CPT

## 2022-07-05 PROCEDURE — 85610 PROTHROMBIN TIME: CPT

## 2022-07-06 ENCOUNTER — APPOINTMENT (OUTPATIENT)
Dept: LAB | Facility: HOSPITAL | Age: 67
End: 2022-07-06
Attending: INTERNAL MEDICINE
Payer: MEDICARE

## 2022-07-06 DIAGNOSIS — Z00.00 ROUTINE GENERAL MEDICAL EXAMINATION AT A HEALTH CARE FACILITY: Primary | ICD-10-CM

## 2022-07-06 DIAGNOSIS — E03.9 MYXEDEMA HEART DISEASE: ICD-10-CM

## 2022-07-06 DIAGNOSIS — E11.9 DIABETES MELLITUS WITHOUT COMPLICATION: ICD-10-CM

## 2022-07-06 DIAGNOSIS — R13.10 PROBLEMS WITH SWALLOWING AND MASTICATION: ICD-10-CM

## 2022-07-06 DIAGNOSIS — I48.91 ATRIAL FIBRILLATION: ICD-10-CM

## 2022-07-06 DIAGNOSIS — M10.9 GOUT, UNSPECIFIED CAUSE, UNSPECIFIED CHRONICITY, UNSPECIFIED SITE: ICD-10-CM

## 2022-07-06 DIAGNOSIS — I25.10 CORONARY ATHEROSCLEROSIS OF NATIVE CORONARY ARTERY: ICD-10-CM

## 2022-07-06 DIAGNOSIS — E78.5 HYPERLIPIDEMIA, UNSPECIFIED HYPERLIPIDEMIA TYPE: ICD-10-CM

## 2022-07-06 DIAGNOSIS — Z12.5 SPECIAL SCREENING FOR MALIGNANT NEOPLASM OF PROSTATE: ICD-10-CM

## 2022-07-06 DIAGNOSIS — I51.9 MYXEDEMA HEART DISEASE: ICD-10-CM

## 2022-07-06 LAB
ABS NEUT (OLG): 6.5 X10(3)/MCL (ref 2.1–9.2)
ACANTHOCYTES (OLG): ABNORMAL
ALBUMIN SERPL-MCNC: 3.8 GM/DL (ref 3.4–4.8)
ALBUMIN/GLOB SERPL: 1.3 RATIO (ref 1.1–2)
ALP SERPL-CCNC: 97 UNIT/L (ref 40–150)
ALT SERPL-CCNC: 41 UNIT/L (ref 0–55)
ANISOCYTOSIS BLD QL SMEAR: ABNORMAL
APPEARANCE UR: CLEAR
AST SERPL-CCNC: 38 UNIT/L (ref 5–34)
BACTERIA #/AREA URNS AUTO: NORMAL /HPF
BASOPHILS NFR BLD MANUAL: 0.2 X10(3)/MCL (ref 0–0.2)
BASOPHILS NFR BLD MANUAL: 2 %
BILIRUB UR QL STRIP.AUTO: NEGATIVE MG/DL
BILIRUBIN DIRECT+TOT PNL SERPL-MCNC: 0.8 MG/DL
BUN SERPL-MCNC: 14.5 MG/DL (ref 8.4–25.7)
CALCIUM SERPL-MCNC: 9.5 MG/DL (ref 8.8–10)
CHLORIDE SERPL-SCNC: 98 MMOL/L (ref 98–107)
CHOLEST SERPL-MCNC: 103 MG/DL
CHOLEST/HDLC SERPL: 3 {RATIO} (ref 0–5)
CO2 SERPL-SCNC: 35 MMOL/L (ref 23–31)
COLOR UR AUTO: YELLOW
CREAT SERPL-MCNC: 1.05 MG/DL (ref 0.73–1.18)
EOSINOPHIL NFR BLD MANUAL: 0.2 X10(3)/MCL (ref 0–0.9)
EOSINOPHIL NFR BLD MANUAL: 2 %
ERYTHROCYTE [DISTWIDTH] IN BLOOD BY AUTOMATED COUNT: 20.9 % (ref 11.5–17)
EST. AVERAGE GLUCOSE BLD GHB EST-MCNC: 220.2 MG/DL
GLOBULIN SER-MCNC: 3 GM/DL (ref 2.4–3.5)
GLUCOSE SERPL-MCNC: 131 MG/DL (ref 82–115)
GLUCOSE UR QL STRIP.AUTO: NEGATIVE MG/DL
HBA1C MFR BLD: 9.3 %
HCT VFR BLD AUTO: 42.9 % (ref 42–52)
HDLC SERPL-MCNC: 39 MG/DL (ref 35–60)
HGB BLD-MCNC: 12.8 GM/DL (ref 14–18)
IMM GRANULOCYTES # BLD AUTO: 0.04 X10(3)/MCL (ref 0–0.04)
IMM GRANULOCYTES NFR BLD AUTO: 0.4 %
INSTRUMENT WBC (OLG): 10 X10(3)/MCL
KETONES UR QL STRIP.AUTO: ABNORMAL MG/DL
LDLC SERPL CALC-MCNC: 26 MG/DL (ref 50–140)
LEUKOCYTE ESTERASE UR QL STRIP.AUTO: NEGATIVE UNIT/L
LYMPHOCYTES NFR BLD MANUAL: 1.5 X10(3)/MCL
LYMPHOCYTES NFR BLD MANUAL: 15 %
MCH RBC QN AUTO: 25.4 PG (ref 27–31)
MCHC RBC AUTO-ENTMCNC: 29.8 MG/DL (ref 33–36)
MCV RBC AUTO: 85.3 FL (ref 80–94)
MONOCYTES NFR BLD MANUAL: 1.7 X10(3)/MCL (ref 0.1–1.3)
MONOCYTES NFR BLD MANUAL: 17 %
NEUTROPHILS NFR BLD MANUAL: 65 %
NITRITE UR QL STRIP.AUTO: NEGATIVE
NRBC BLD AUTO-RTO: 0 %
PH UR STRIP.AUTO: 6.5 [PH]
PLATELET # BLD AUTO: 480 X10(3)/MCL (ref 130–400)
PLATELET # BLD EST: ABNORMAL 10*3/UL
PMV BLD AUTO: 10.3 FL (ref 7.4–10.4)
POIKILOCYTOSIS BLD QL SMEAR: ABNORMAL
POTASSIUM SERPL-SCNC: 3.2 MMOL/L (ref 3.5–5.1)
PROT SERPL-MCNC: 6.8 GM/DL (ref 5.8–7.6)
PROT UR QL STRIP.AUTO: ABNORMAL MG/DL
PSA SERPL-MCNC: 5.73 NG/ML
RBC # BLD AUTO: 5.03 X10(6)/MCL (ref 4.7–6.1)
RBC #/AREA URNS AUTO: NORMAL /HPF
RBC UR QL AUTO: NEGATIVE UNIT/L
SCHISTOCYTE (OLG): ABNORMAL
SODIUM SERPL-SCNC: 145 MMOL/L (ref 136–145)
SP GR UR STRIP.AUTO: 1.02 (ref 1–1.03)
SQUAMOUS #/AREA URNS AUTO: <5 /HPF
TRIGL SERPL-MCNC: 188 MG/DL (ref 34–140)
TSH SERPL-ACNC: 0.01 UIU/ML (ref 0.35–4.94)
URATE SERPL-MCNC: 6.3 MG/DL (ref 3.5–7.2)
UROBILINOGEN UR STRIP-ACNC: 1 MG/DL
VLDLC SERPL CALC-MCNC: 38 MG/DL
WBC # SPEC AUTO: 10 X10(3)/MCL (ref 4.5–11.5)
WBC #/AREA URNS AUTO: <5 /HPF

## 2022-07-06 PROCEDURE — 80061 LIPID PANEL: CPT

## 2022-07-06 PROCEDURE — 81001 URINALYSIS AUTO W/SCOPE: CPT

## 2022-07-06 PROCEDURE — 80053 COMPREHEN METABOLIC PANEL: CPT

## 2022-07-06 PROCEDURE — 85025 COMPLETE CBC W/AUTO DIFF WBC: CPT

## 2022-07-06 PROCEDURE — 83036 HEMOGLOBIN GLYCOSYLATED A1C: CPT

## 2022-07-06 PROCEDURE — 84550 ASSAY OF BLOOD/URIC ACID: CPT

## 2022-07-06 PROCEDURE — 36415 COLL VENOUS BLD VENIPUNCTURE: CPT

## 2022-07-06 PROCEDURE — 84443 ASSAY THYROID STIM HORMONE: CPT

## 2022-07-06 PROCEDURE — 84153 ASSAY OF PSA TOTAL: CPT

## 2022-07-06 RX ORDER — ROSUVASTATIN CALCIUM 40 MG/1
40 TABLET, COATED ORAL NIGHTLY
COMMUNITY
Start: 2022-04-12

## 2022-07-06 RX ORDER — LEVOTHYROXINE SODIUM 175 UG/1
175 TABLET ORAL DAILY
COMMUNITY
Start: 2022-06-17 | End: 2022-09-23

## 2022-07-06 RX ORDER — FLUTICASONE PROPIONATE 50 MCG
1 SPRAY, SUSPENSION (ML) NASAL 2 TIMES DAILY
COMMUNITY
Start: 2022-04-26 | End: 2022-08-15 | Stop reason: SDUPTHER

## 2022-07-06 RX ORDER — METFORMIN HYDROCHLORIDE 500 MG/1
500 TABLET ORAL DAILY
COMMUNITY
Start: 2022-06-17 | End: 2022-10-12 | Stop reason: SDUPTHER

## 2022-07-06 RX ORDER — FUROSEMIDE 40 MG/1
40 TABLET ORAL 2 TIMES DAILY
COMMUNITY
Start: 2022-06-16 | End: 2022-09-23 | Stop reason: SDUPTHER

## 2022-07-06 RX ORDER — METOLAZONE 5 MG/1
5 TABLET ORAL DAILY PRN
COMMUNITY
Start: 2022-04-12

## 2022-07-06 RX ORDER — AMIODARONE HYDROCHLORIDE 200 MG/1
200 TABLET ORAL DAILY
COMMUNITY
Start: 2022-04-12

## 2022-07-06 RX ORDER — ASCORBIC ACID 500 MG
500 TABLET ORAL DAILY
COMMUNITY
End: 2024-02-01

## 2022-07-06 RX ORDER — WARFARIN SODIUM 5 MG/1
2.5 TABLET ORAL DAILY
COMMUNITY
Start: 2021-11-29 | End: 2022-10-19 | Stop reason: SDUPTHER

## 2022-07-06 RX ORDER — NITROGLYCERIN 0.4 MG/1
0.4 TABLET SUBLINGUAL DAILY PRN
COMMUNITY
Start: 2021-11-25

## 2022-07-06 RX ORDER — ACETAMINOPHEN 500 MG
2000 TABLET ORAL DAILY
COMMUNITY
Start: 2021-11-25

## 2022-07-06 RX ORDER — METOPROLOL TARTRATE 100 MG/1
100 TABLET ORAL 2 TIMES DAILY
COMMUNITY
Start: 2022-04-12 | End: 2023-01-26 | Stop reason: ALTCHOICE

## 2022-07-11 ENCOUNTER — ANTI-COAG VISIT (OUTPATIENT)
Dept: CARDIOLOGY | Facility: HOSPITAL | Age: 67
End: 2022-07-11
Payer: MEDICARE

## 2022-07-11 DIAGNOSIS — Z95.2 H/O MECHANICAL AORTIC VALVE REPLACEMENT: Primary | ICD-10-CM

## 2022-07-11 LAB
CTP QC/QA: YES
INR PPP: 2 (ref 2–3)
PROTHROMBIN TIME, POC: 23.5 (ref 2–3)

## 2022-07-11 PROCEDURE — 85610 PROTHROMBIN TIME: CPT

## 2022-07-12 ENCOUNTER — OFFICE VISIT (OUTPATIENT)
Dept: INTERNAL MEDICINE | Facility: CLINIC | Age: 67
End: 2022-07-12
Payer: MEDICARE

## 2022-07-12 VITALS
WEIGHT: 187.81 LBS | RESPIRATION RATE: 18 BRPM | SYSTOLIC BLOOD PRESSURE: 110 MMHG | HEIGHT: 72 IN | DIASTOLIC BLOOD PRESSURE: 72 MMHG | TEMPERATURE: 98 F | HEART RATE: 72 BPM | BODY MASS INDEX: 25.44 KG/M2

## 2022-07-12 DIAGNOSIS — E78.5 HYPERLIPIDEMIA, UNSPECIFIED HYPERLIPIDEMIA TYPE: ICD-10-CM

## 2022-07-12 DIAGNOSIS — I50.32 CHF (CONGESTIVE HEART FAILURE), NYHA CLASS I, CHRONIC, DIASTOLIC: ICD-10-CM

## 2022-07-12 DIAGNOSIS — E03.9 HYPOTHYROIDISM, UNSPECIFIED TYPE: ICD-10-CM

## 2022-07-12 DIAGNOSIS — I48.0 PAROXYSMAL ATRIAL FIBRILLATION: ICD-10-CM

## 2022-07-12 DIAGNOSIS — R13.10 DYSPHAGIA, UNSPECIFIED TYPE: ICD-10-CM

## 2022-07-12 DIAGNOSIS — I25.10 CORONARY ARTERY DISEASE, UNSPECIFIED VESSEL OR LESION TYPE, UNSPECIFIED WHETHER ANGINA PRESENT, UNSPECIFIED WHETHER NATIVE OR TRANSPLANTED HEART: ICD-10-CM

## 2022-07-12 DIAGNOSIS — E11.9 TYPE 2 DIABETES MELLITUS WITHOUT COMPLICATION, WITHOUT LONG-TERM CURRENT USE OF INSULIN: ICD-10-CM

## 2022-07-12 DIAGNOSIS — K21.9 GASTROESOPHAGEAL REFLUX DISEASE, UNSPECIFIED WHETHER ESOPHAGITIS PRESENT: ICD-10-CM

## 2022-07-12 DIAGNOSIS — Z00.00 WELLNESS EXAMINATION: Primary | ICD-10-CM

## 2022-07-12 PROCEDURE — 99397 PER PM REEVAL EST PAT 65+ YR: CPT | Mod: GZ,,, | Performed by: INTERNAL MEDICINE

## 2022-07-12 PROCEDURE — 99397 PR PREVENTIVE VISIT,EST,65 & OVER: ICD-10-PCS | Mod: GZ,,, | Performed by: INTERNAL MEDICINE

## 2022-07-12 RX ORDER — METFORMIN HYDROCHLORIDE 500 MG/1
TABLET ORAL
Qty: 180 TABLET | Refills: 3 | Status: SHIPPED | OUTPATIENT
Start: 2022-07-12 | End: 2022-10-12 | Stop reason: SDUPTHER

## 2022-07-12 RX ORDER — LEVOTHYROXINE SODIUM 150 UG/1
150 TABLET ORAL
Qty: 30 TABLET | Refills: 11 | Status: SHIPPED | OUTPATIENT
Start: 2022-07-12 | End: 2022-09-23

## 2022-07-12 NOTE — PROGRESS NOTES
Cory Watts MD        PATIENT NAME: Bertin Alonzo  : 1955  DATE: 22  MRN: [id number]      Billing Provider: Cory aWtts MD  Level of Service: [place] PREVENTIVE VISIT,EST,65 & OVER  Patient PCP Information     Provider PCP Type    Cory Watts MD General          Reason for Visit / Chief Complaint: Medicare AWV (d) and Diabetes       Update PCP  Update Chief Complaint         History of Present Illness / Problem Focused Workflow     Bertin Alonzo presents to the clinic with Medicare AWV (d) and Diabetes     Bertin is here for his annual wellness exam  He has had a significant history in October he had coronary artery disease and needed emergency procedure and upon a ventilator with speech difficulties and a PEG tube.  Also that is resolved he still needs speech therapy.  He is ambulatory no shortness of breath or chest pain at this time      Review of Systems   Review of Systems   Constitutional: Negative.    HENT: Negative.    Eyes: Negative.    Respiratory: Negative.    Cardiovascular: Negative.    Gastrointestinal: Negative.    Endocrine: Negative.    Genitourinary: Negative.    Musculoskeletal: Negative.    Integumentary:  Negative.   Neurological: Negative.    Psychiatric/Behavioral: Negative.         Medical / Social / Family History     Past Medical History:   Diagnosis Date    CAD (coronary artery disease)     CHF (congestive heart failure), NYHA class I, chronic, diastolic     Dysphagia     GERD (gastroesophageal reflux disease)     Hodgkin's disease     Hypothyroidism, unspecified     Paroxysmal atrial fibrillation     Type 2 diabetes mellitus without complication, without long-term current use of insulin        Past Surgical History:   Procedure Laterality Date    AORTIC VALUE      APPENDECTOMY      BYPASS OF MESENTERIC ARTERY      CATARACT EXTRACTION, BILATERAL      PLEURODESIS USING TALC      SPLENECTOMY, TOTAL      STENTS      CORONARY     THYROID SURGERY         Social History  Mr. Alonzo  reports that he has never smoked. He has never used smokeless tobacco. He reports current alcohol use of about 1.0 standard drink of alcohol per week. He reports that he does not use drugs.    Family History  Mr.'s Alonzo  family history includes Cancer in his father; Hodgkin's lymphoma in his mother.    Medications and Allergies     Medications  Outpatient Medications Marked as Taking for the 7/12/22 encounter (Office Visit) with Cory Villanueva MD   Medication Sig Dispense Refill    amiodarone (PACERONE) 200 MG Tab Take 200 mg by mouth once daily.      ascorbic acid, vitamin C, (VITAMIN C) 500 MG tablet Take 500 mg by mouth once daily.      cholecalciferol, vitamin D3, (VITAMIN D3) 50 mcg (2,000 unit) Cap capsule Take 5,000 Units by mouth once daily at 6am.      fluticasone propionate (FLONASE) 50 mcg/actuation nasal spray 1 spray by Nasal route 2 (two) times daily.      furosemide (LASIX) 40 MG tablet Take 40 mg by mouth 2 (two) times daily.      levothyroxine (SYNTHROID, LEVOTHROID) 175 MCG tablet Take 175 mcg by mouth once daily.      metFORMIN (GLUCOPHAGE) 500 MG tablet Take 500 mg by mouth once daily.      metOLazone (ZAROXOLYN) 5 MG tablet Take 5 mg by mouth daily as needed.      metoprolol tartrate (LOPRESSOR) 100 MG tablet Take 100 mg by mouth 2 (two) times daily.      multivit-min/ferrous fumarate (MULTI VITAMIN ORAL) Take 1 tablet by mouth Daily.      nitroGLYCERIN (NITROSTAT) 0.4 MG SL tablet Place 0.4 mg under the tongue daily as needed.      rosuvastatin (CRESTOR) 40 MG Tab Take 40 mg by mouth nightly.      warfarin (COUMADIN) 5 MG tablet Take 5 mg by mouth Daily. EXCEPT TUES AND SAT 2.5         Allergies  Review of patient's allergies indicates:   Allergen Reactions    Digoxin      Other reaction(s): Toxicity       Physical Examination     Vitals:    07/12/22 1043   BP: 110/72   Pulse: 72   Resp: 18   Temp: 98 °F (36.7 °C)      Physical Exam  Vitals reviewed.   Constitutional:       Appearance: Normal appearance.   HENT:      Head: Normocephalic.      Right Ear: Tympanic membrane normal.      Left Ear: Tympanic membrane normal.      Nose: Nose normal.      Mouth/Throat:      Pharynx: Oropharynx is clear.   Eyes:      Extraocular Movements: Extraocular movements intact.      Pupils: Pupils are equal, round, and reactive to light.   Cardiovascular:      Rate and Rhythm: Normal rate and regular rhythm.      Pulses: Normal pulses.      Heart sounds: Normal heart sounds.   Pulmonary:      Effort: Pulmonary effort is normal.      Breath sounds: Normal breath sounds.   Abdominal:      General: Abdomen is flat. Bowel sounds are normal.      Palpations: Abdomen is soft.   Musculoskeletal:         General: Normal range of motion.      Cervical back: Normal range of motion.   Feet:      Right foot:      Protective Sensation: 2 sites tested. 2 sites sensed.      Skin integrity: Skin integrity normal.      Toenail Condition: Right toenails are normal.      Left foot:      Protective Sensation: 2 sites tested. 2 sites sensed.      Skin integrity: Skin integrity normal.      Toenail Condition: Left toenails are normal.   Skin:     General: Skin is warm and dry.   Neurological:      General: No focal deficit present.      Mental Status: He is alert and oriented to person, place, and time.   Psychiatric:         Mood and Affect: Mood normal.         Behavior: Behavior normal.          Assessment and Plan (including Health Maintenance)      Problem List  Smart Sets  Document Outside HM   :    Plan:   Wellness examination    Coronary artery disease, unspecified vessel or lesion type, unspecified whether angina present, unspecified whether native or transplanted heart    Paroxysmal atrial fibrillation    CHF (congestive heart failure), NYHA class I, chronic, diastolic    Hypothyroidism, unspecified type    Type 2 diabetes mellitus without complication,  without long-term current use of insulin    Dysphagia, unspecified type    Gastroesophageal reflux disease, unspecified whether esophagitis present    Hyperlipidemia, unspecified hyperlipidemia type    Discussion on lab results  A1c is markedly elevated into the 9 level TSH is low  Discussed diabetes in his medications and his diet  Will increase his metformin to a 1000 mg in the morning and 500 at night  Will decrease Synthroid to the 0.15 dosage  Will having back in 3 months to check his diabetic and thyroid level   He will see his urologist Dr. Kaminski for his prostate evaluation in his PSA level.      Health Maintenance Due   Topic Date Due    Hepatitis C Screening  Never done    Diabetes Urine Screening  Never done    Foot Exam  Never done    Colorectal Cancer Screening  Never done    Eye Exam  03/28/2020       Problem List Items Addressed This Visit        Cardiac/Vascular    CAD (coronary artery disease)    Paroxysmal atrial fibrillation    CHF (congestive heart failure), NYHA class I, chronic, diastolic    Hyperlipidemia       Endocrine    Hypothyroidism, unspecified    Type 2 diabetes mellitus without complication, without long-term current use of insulin    Relevant Medications    metFORMIN (GLUCOPHAGE) 500 MG tablet       GI    Dysphagia    GERD (gastroesophageal reflux disease)      Other Visit Diagnoses     Wellness examination    -  Primary          Health Maintenance Topics with due status: Not Due       Topic Last Completion Date    Influenza Vaccine 10/05/2021    Lipid Panel 07/06/2022    Hemoglobin A1c 07/06/2022    High Dose Statin 07/12/2022       Future Appointments   Date Time Provider Department Center   7/18/2022 11:00 AM COUMADIN, [facility]   4/12/2023  9:40 AM NURSE PRACTITIONER, [facility]            Signature:  Cory Watts MD  [facility]  [address] [place] [id number]    Date of  encounter: 7/12/22

## 2022-07-18 ENCOUNTER — ANTI-COAG VISIT (OUTPATIENT)
Dept: CARDIOLOGY | Facility: HOSPITAL | Age: 67
End: 2022-07-18
Payer: MEDICARE

## 2022-07-18 DIAGNOSIS — Z95.2 H/O MECHANICAL AORTIC VALVE REPLACEMENT: Primary | ICD-10-CM

## 2022-07-18 LAB
CTP QC/QA: YES
INR PPP: 3.1 (ref 2–3)
PROTHROMBIN TIME, POC: 37.2 (ref 2–3)

## 2022-07-18 PROCEDURE — 85610 PROTHROMBIN TIME: CPT

## 2022-08-02 ENCOUNTER — ANTI-COAG VISIT (OUTPATIENT)
Dept: CARDIOLOGY | Facility: HOSPITAL | Age: 67
End: 2022-08-02
Payer: MEDICARE

## 2022-08-02 DIAGNOSIS — Z79.01 LONG TERM (CURRENT) USE OF ANTICOAGULANTS: ICD-10-CM

## 2022-08-02 DIAGNOSIS — Z95.2 H/O MECHANICAL AORTIC VALVE REPLACEMENT: Primary | ICD-10-CM

## 2022-08-02 LAB
CTP QC/QA: YES
INR PPP: 2.6 (ref 2–3)
PROTHROMBIN TIME, POC: 31 (ref 2–3)

## 2022-08-02 PROCEDURE — 85610 PROTHROMBIN TIME: CPT

## 2022-08-02 PROCEDURE — 99211 OFF/OP EST MAY X REQ PHY/QHP: CPT

## 2022-08-03 DIAGNOSIS — R13.11 DYSPHAGIA, ORAL PHASE: Primary | ICD-10-CM

## 2022-08-03 DIAGNOSIS — K21.9 ESOPHAGEAL REFLUX: ICD-10-CM

## 2022-08-06 ENCOUNTER — OFFICE VISIT (OUTPATIENT)
Dept: URGENT CARE | Facility: CLINIC | Age: 67
End: 2022-08-06
Payer: MEDICARE

## 2022-08-06 VITALS
BODY MASS INDEX: 24.38 KG/M2 | TEMPERATURE: 98 F | SYSTOLIC BLOOD PRESSURE: 121 MMHG | OXYGEN SATURATION: 97 % | RESPIRATION RATE: 17 BRPM | DIASTOLIC BLOOD PRESSURE: 73 MMHG | HEART RATE: 74 BPM | WEIGHT: 180 LBS | HEIGHT: 72 IN

## 2022-08-06 DIAGNOSIS — J02.9 SORE THROAT: ICD-10-CM

## 2022-08-06 DIAGNOSIS — J02.0 STREP THROAT: Primary | ICD-10-CM

## 2022-08-06 LAB
CTP QC/QA: YES
S PYO RRNA THROAT QL PROBE: POSITIVE

## 2022-08-06 PROCEDURE — 99203 PR OFFICE/OUTPT VISIT, NEW, LEVL III, 30-44 MIN: ICD-10-PCS | Mod: 25,,, | Performed by: GENERAL PRACTICE

## 2022-08-06 PROCEDURE — 96372 PR INJECTION,THERAP/PROPH/DIAG2ST, IM OR SUBCUT: ICD-10-PCS | Mod: ,,, | Performed by: GENERAL PRACTICE

## 2022-08-06 PROCEDURE — 87880 POCT RAPID STREP A: ICD-10-PCS | Mod: QW,,, | Performed by: GENERAL PRACTICE

## 2022-08-06 PROCEDURE — 99203 OFFICE O/P NEW LOW 30 MIN: CPT | Mod: 25,,, | Performed by: GENERAL PRACTICE

## 2022-08-06 PROCEDURE — 96372 THER/PROPH/DIAG INJ SC/IM: CPT | Mod: ,,, | Performed by: GENERAL PRACTICE

## 2022-08-06 PROCEDURE — 87880 STREP A ASSAY W/OPTIC: CPT | Mod: QW,,, | Performed by: GENERAL PRACTICE

## 2022-08-06 RX ORDER — BETAMETHASONE SODIUM PHOSPHATE AND BETAMETHASONE ACETATE 3; 3 MG/ML; MG/ML
9 INJECTION, SUSPENSION INTRA-ARTICULAR; INTRALESIONAL; INTRAMUSCULAR; SOFT TISSUE
Status: COMPLETED | OUTPATIENT
Start: 2022-08-06 | End: 2022-08-06

## 2022-08-06 RX ORDER — AZITHROMYCIN 250 MG/1
TABLET, FILM COATED ORAL
Qty: 6 TABLET | Refills: 0 | Status: SHIPPED | OUTPATIENT
Start: 2022-08-06 | End: 2023-01-26

## 2022-08-06 RX ADMIN — BETAMETHASONE SODIUM PHOSPHATE AND BETAMETHASONE ACETATE 9 MG: 3; 3 INJECTION, SUSPENSION INTRA-ARTICULAR; INTRALESIONAL; INTRAMUSCULAR; SOFT TISSUE at 11:08

## 2022-08-06 NOTE — PROGRESS NOTES
Subjective:       Patient ID: Bertin Alonzo is a 67 y.o. male.    Vitals:  height is 6' (1.829 m) and weight is 81.6 kg (180 lb). His temperature is 97.8 °F (36.6 °C). His blood pressure is 121/73 and his pulse is 74. His respiration is 17 and oxygen saturation is 97%.     Chief Complaint: Sore Throat (Sore throat, swollen glands, hurts to swallow, left ear soreness 2-3 days, worse last night, tried taking tylenol for rellief)    Patient presents to the clinic with the symptoms listed in the chief complaint, most significant of which is a sore throat for the past 2 or 3 days.      Constitution: Negative for fever and generalized weakness.   HENT: Positive for sore throat.    Neck: Negative for neck stiffness.   Cardiovascular: Negative.    Respiratory: Negative for cough.    Gastrointestinal: Negative.    Musculoskeletal: Negative.        Objective:      Physical Exam   HENT:   Ears:   Right Ear: Tympanic membrane normal.   Left Ear: Tympanic membrane normal.   Nose: Nose normal.   Mouth/Throat: Posterior oropharyngeal erythema present.   Eyes: Conjunctivae are normal.   Cardiovascular: Normal rate and regular rhythm.   Pulmonary/Chest: Effort normal and breath sounds normal.   Abdominal: Normal appearance.     Results for orders placed or performed in visit on 08/06/22   POCT rapid strep A   Result Value Ref Range    Rapid Strep A Screen Positive (A) Negative     Acceptable Yes          Assessment:       1. Strep throat    2. Sore throat          Plan:       Increase fluid intake.  Contagious for approximately 24 hours after 1st dose of antibiotic, take appropriate precautions.  Tylenol or ibuprofen for fever/pain.  Take medication with guaifenesin and dextromethorphan to help with cough and congestion, Mucinex is an example  Start antibiotic today.  Contact this clinic if he any significant problems.  Strep throat  -     betamethasone acetate-betamethasone sodium phosphate injection 9 mg  -      azithromycin (Z-MANSI) 250 MG tablet; Take 2 tablets by mouth on day 1; Take 1 tablet by mouth on days 2-5  Dispense: 6 tablet; Refill: 0    Sore throat  -     POCT rapid strep A

## 2022-08-15 DIAGNOSIS — R09.81 SINUS CONGESTION: Primary | ICD-10-CM

## 2022-08-15 RX ORDER — FLUTICASONE PROPIONATE 50 MCG
1 SPRAY, SUSPENSION (ML) NASAL 2 TIMES DAILY
Qty: 1 G | Refills: 3 | Status: SHIPPED | OUTPATIENT
Start: 2022-08-15 | End: 2023-01-26

## 2022-08-29 ENCOUNTER — ANTI-COAG VISIT (OUTPATIENT)
Dept: CARDIOLOGY | Facility: HOSPITAL | Age: 67
End: 2022-08-29
Payer: MEDICARE

## 2022-08-29 DIAGNOSIS — Z95.2 H/O MECHANICAL AORTIC VALVE REPLACEMENT: Primary | ICD-10-CM

## 2022-08-29 DIAGNOSIS — Z79.01 LONG TERM (CURRENT) USE OF ANTICOAGULANTS: ICD-10-CM

## 2022-08-29 LAB
CTP QC/QA: YES
INR PPP: 3.8 (ref 2–3)
PROTHROMBIN TIME, POC: 45.1 (ref 2–3)

## 2022-08-29 PROCEDURE — 85610 PROTHROMBIN TIME: CPT

## 2022-08-29 NOTE — PROGRESS NOTES
2.5mg of warfarin today, plus pt will eat a 1/2 cup portion of greens this evening.  He will cont. with a decrease in dosage Tues. 8/30.

## 2022-09-12 ENCOUNTER — ANTI-COAG VISIT (OUTPATIENT)
Dept: CARDIOLOGY | Facility: HOSPITAL | Age: 67
End: 2022-09-12
Payer: MEDICARE

## 2022-09-12 DIAGNOSIS — Z95.2 H/O MECHANICAL AORTIC VALVE REPLACEMENT: Primary | ICD-10-CM

## 2022-09-12 DIAGNOSIS — Z79.01 LONG TERM (CURRENT) USE OF ANTICOAGULANTS: ICD-10-CM

## 2022-09-12 LAB
CTP QC/QA: YES
INR PPP: 3.1 (ref 2–3)
PROTHROMBIN TIME, POC: 37.8 (ref 2–3)

## 2022-09-12 PROCEDURE — 85610 PROTHROMBIN TIME: CPT

## 2022-09-12 PROCEDURE — 99211 OFF/OP EST MAY X REQ PHY/QHP: CPT

## 2022-09-23 DIAGNOSIS — E03.9 HYPOTHYROIDISM, UNSPECIFIED TYPE: ICD-10-CM

## 2022-09-23 DIAGNOSIS — I50.32 CHF (CONGESTIVE HEART FAILURE), NYHA CLASS I, CHRONIC, DIASTOLIC: Primary | ICD-10-CM

## 2022-09-23 RX ORDER — LEVOTHYROXINE SODIUM 175 UG/1
175 TABLET ORAL DAILY
Qty: 30 TABLET | Refills: 5 | Status: SHIPPED | OUTPATIENT
Start: 2022-09-23 | End: 2022-10-19

## 2022-09-23 RX ORDER — FUROSEMIDE 40 MG/1
40 TABLET ORAL 2 TIMES DAILY
Qty: 180 TABLET | Refills: 3 | Status: SHIPPED | OUTPATIENT
Start: 2022-09-23

## 2022-10-03 ENCOUNTER — ANTI-COAG VISIT (OUTPATIENT)
Dept: CARDIOLOGY | Facility: HOSPITAL | Age: 67
End: 2022-10-03
Payer: MEDICARE

## 2022-10-03 DIAGNOSIS — Z95.2 H/O MECHANICAL AORTIC VALVE REPLACEMENT: Primary | ICD-10-CM

## 2022-10-03 DIAGNOSIS — Z79.01 LONG TERM (CURRENT) USE OF ANTICOAGULANTS: ICD-10-CM

## 2022-10-03 LAB
CTP QC/QA: YES
INR PPP: 2.8 (ref 2–3)
PROTHROMBIN TIME, POC: 33.3 (ref 2–3)

## 2022-10-03 PROCEDURE — 85610 PROTHROMBIN TIME: CPT

## 2022-10-03 PROCEDURE — 99211 OFF/OP EST MAY X REQ PHY/QHP: CPT

## 2022-10-12 DIAGNOSIS — E11.9 TYPE 2 DIABETES MELLITUS WITHOUT COMPLICATION, WITHOUT LONG-TERM CURRENT USE OF INSULIN: Primary | ICD-10-CM

## 2022-10-12 RX ORDER — METFORMIN HYDROCHLORIDE 500 MG/1
500 TABLET ORAL DAILY
Qty: 30 TABLET | Refills: 12 | Status: SHIPPED | OUTPATIENT
Start: 2022-10-12 | End: 2024-02-19

## 2022-10-12 RX ORDER — METFORMIN HYDROCHLORIDE 500 MG/1
TABLET ORAL
Qty: 180 TABLET | Refills: 3 | Status: SHIPPED | OUTPATIENT
Start: 2022-10-12 | End: 2023-01-26 | Stop reason: ALTCHOICE

## 2022-10-14 ENCOUNTER — TELEPHONE (OUTPATIENT)
Dept: INTERNAL MEDICINE | Facility: CLINIC | Age: 67
End: 2022-10-14
Payer: MEDICARE

## 2022-10-19 ENCOUNTER — OFFICE VISIT (OUTPATIENT)
Dept: INTERNAL MEDICINE | Facility: CLINIC | Age: 67
End: 2022-10-19
Payer: MEDICARE

## 2022-10-19 VITALS
TEMPERATURE: 97 F | SYSTOLIC BLOOD PRESSURE: 110 MMHG | OXYGEN SATURATION: 99 % | HEIGHT: 72 IN | WEIGHT: 175 LBS | HEART RATE: 79 BPM | DIASTOLIC BLOOD PRESSURE: 66 MMHG | BODY MASS INDEX: 23.7 KG/M2

## 2022-10-19 DIAGNOSIS — E11.9 TYPE 2 DIABETES MELLITUS WITHOUT COMPLICATION, WITHOUT LONG-TERM CURRENT USE OF INSULIN: ICD-10-CM

## 2022-10-19 DIAGNOSIS — E03.9 HYPOTHYROIDISM, UNSPECIFIED TYPE: Primary | ICD-10-CM

## 2022-10-19 DIAGNOSIS — Z79.01 ANTICOAGULANT LONG-TERM USE: Chronic | ICD-10-CM

## 2022-10-19 DIAGNOSIS — E87.6 HYPOKALEMIA: ICD-10-CM

## 2022-10-19 DIAGNOSIS — Z23 NEED FOR VACCINATION: ICD-10-CM

## 2022-10-19 PROCEDURE — 99214 OFFICE O/P EST MOD 30 MIN: CPT | Mod: ,,,

## 2022-10-19 PROCEDURE — 90694 FLU VACCINE - QUADRIVALENT - ADJUVANTED: ICD-10-PCS | Mod: ,,,

## 2022-10-19 PROCEDURE — G0008 FLU VACCINE - QUADRIVALENT - ADJUVANTED: ICD-10-PCS | Mod: ,,,

## 2022-10-19 PROCEDURE — 90694 VACC AIIV4 NO PRSRV 0.5ML IM: CPT | Mod: ,,,

## 2022-10-19 PROCEDURE — 99214 PR OFFICE/OUTPT VISIT, EST, LEVL IV, 30-39 MIN: ICD-10-PCS | Mod: ,,,

## 2022-10-19 PROCEDURE — G0008 ADMIN INFLUENZA VIRUS VAC: HCPCS | Mod: ,,,

## 2022-10-19 RX ORDER — CLOPIDOGREL BISULFATE 75 MG/1
75 TABLET ORAL DAILY
COMMUNITY

## 2022-10-19 RX ORDER — WARFARIN 2.5 MG/1
2.5 TABLET ORAL DAILY
Qty: 40 TABLET | Refills: 0 | Status: SHIPPED | OUTPATIENT
Start: 2022-10-19 | End: 2022-10-19

## 2022-10-19 RX ORDER — LEVOTHYROXINE SODIUM 137 UG/1
137 TABLET ORAL
Qty: 90 TABLET | Refills: 1 | Status: SHIPPED | OUTPATIENT
Start: 2022-10-19 | End: 2023-01-26

## 2022-10-19 NOTE — PROGRESS NOTES
Patient ID: Bertin Alonzo is a 67 y.o. male.    Chief Complaint: Follow-up (3 month f/u)    67-year-old male here today for 3 month follow-up visit.  Last visit seen in July with noted elevated HGB A1c level of 9, subsequently had metformin increased to 1,000 mg a.m./500 mg p.m. today reports average fasting sugars in the 110s range, no recent HGB A1c noted.  Order placed today.  Also with noted overcorrected TSH currently on levothyroxine 150 mcg daily, will decrease to 137 mcg daily and repeat TSH in 3 months.  Patient also noted to have potassium 3.0, previously noted low as well, currently on furosemide for CHF.  Will repeat potassium level as well.  Otherwise no other acute medical concerns noted.      MEDICAL HISTORY:    Past Medical History:   Diagnosis Date    CAD (coronary artery disease)     CHF (congestive heart failure), NYHA class I, chronic, diastolic     Dysphagia     GERD (gastroesophageal reflux disease)     Hodgkin's disease     Hypothyroidism, unspecified     Paroxysmal atrial fibrillation     Type 2 diabetes mellitus without complication, without long-term current use of insulin       Past Surgical History:   Procedure Laterality Date    AORTIC VALUE      APPENDECTOMY      BYPASS OF MESENTERIC ARTERY      CATARACT EXTRACTION, BILATERAL      PLEURODESIS USING TALC      SPLENECTOMY, TOTAL      STENTS      CORONARY    THYROID SURGERY        Social History     Tobacco Use    Smoking status: Never    Smokeless tobacco: Never   Substance Use Topics    Alcohol use: Yes     Alcohol/week: 1.0 standard drink     Types: 1 Shots of liquor per week    Drug use: Never          Health Maintenance Due   Topic Date Due    Hepatitis C Screening  Never done    Diabetes Urine Screening  Never done    Foot Exam  Never done    Colorectal Cancer Screening  Never done    Eye Exam  03/28/2020          Patient Care Team:  Cory Villanueva MD as PCP - General (Internal Medicine)  MD Fahad Sneed  MD Yamilex as Consulting Physician (Urology)  Enio Hutson MD as Consulting Physician (Cardiovascular Disease)      Review of Systems   Constitutional:  Negative for fatigue and fever.   HENT:  Negative for congestion, rhinorrhea, sore throat and trouble swallowing.    Eyes:  Negative for redness and visual disturbance.   Respiratory:  Negative for cough, chest tightness and shortness of breath.    Cardiovascular:  Negative for chest pain and palpitations.   Gastrointestinal:  Negative for abdominal pain, constipation, diarrhea, nausea and vomiting.   Genitourinary:  Negative for dysuria, flank pain, frequency and urgency.   Musculoskeletal:  Negative for arthralgias, gait problem and myalgias.   Skin:  Negative for rash and wound.   Neurological:  Negative for facial asymmetry, speech difficulty, weakness and headaches.   All other systems reviewed and are negative.    Objective:   /66 (BP Location: Left arm, Patient Position: Sitting, BP Method: Large (Automatic))   Pulse 79   Temp 97.3 °F (36.3 °C)   Ht 6' (1.829 m)   Wt 79.4 kg (175 lb)   SpO2 99%   BMI 23.73 kg/m²      Physical Exam  Constitutional:       General: He is not in acute distress.     Appearance: Normal appearance.   HENT:      Right Ear: Tympanic membrane, ear canal and external ear normal.      Left Ear: Tympanic membrane, ear canal and external ear normal.      Nose: Nose normal.      Mouth/Throat:      Mouth: Mucous membranes are moist.      Pharynx: Oropharynx is clear.   Eyes:      Extraocular Movements: Extraocular movements intact.      Conjunctiva/sclera: Conjunctivae normal.      Pupils: Pupils are equal, round, and reactive to light.   Cardiovascular:      Rate and Rhythm: Normal rate and regular rhythm.      Pulses: Normal pulses.      Heart sounds: Normal heart sounds. No murmur heard.    No gallop.   Pulmonary:      Effort: Pulmonary effort is normal.      Breath sounds: Normal breath sounds. No wheezing.   Abdominal:       General: Bowel sounds are normal. There is no distension.      Palpations: Abdomen is soft. There is no mass.      Tenderness: There is no abdominal tenderness. There is no guarding.   Musculoskeletal:         General: Normal range of motion.   Skin:     General: Skin is warm and dry.   Neurological:      Mental Status: He is alert. Mental status is at baseline.      Sensory: No sensory deficit.      Motor: No weakness.         Assessment:       ICD-10-CM ICD-9-CM   1. Hypothyroidism, unspecified type  E03.9 244.9   2. Hypokalemia  E87.6 276.8   3. Type 2 diabetes mellitus without complication, without long-term current use of insulin  E11.9 250.00   4. Need for vaccination  Z23 V05.9   5. Anticoagulant long-term use  Z79.01 V58.61        Plan:     Problem List Items Addressed This Visit          Renal/    Hypokalemia     - K level 3.0, repeat potassium level  -noted currently on furosemide for CHF  -consider potassium supplement if persistently low         Relevant Orders    Potassium       Hematology    Anticoagulant long-term use (Chronic)     -currently on warfarin followed by Cardiology  -apparently patient with pharmacy complications/ trouble filling warfarin, refill            Endocrine    Hypothyroidism, unspecified - Primary     Lab Results   Component Value Date    TSH 0.1423 (L) 10/03/2022   -currently on levothyroxine 150 mcg daily, decreased to 137 mcg and repeat TSH for next visit           Relevant Medications    levothyroxine (SYNTHROID) 137 MCG Tab tablet    Type 2 diabetes mellitus without complication, without long-term current use of insulin     Previous HGB A1c 9 (July 2022), repeat HbA1c lab today   -last visit had metformin increased to 1000 mg a.m./500 mg p.m., continue for now titrate & as needed   -consider incorporating Jardiance or Farxiga due to HX CHF as well as DM         Relevant Orders    Hemoglobin A1C     Other Visit Diagnoses       Need for vaccination        Relevant  Orders    Influenza (FLUAD) - Quadrivalent (Adjuvanted) *Preferred* (65+) (PF) (Completed)               Follow up in about 3 months (around 1/19/2023) for Diabetes, Thyroid.   -plan specifics discussed above    Orders Placed This Encounter    Influenza (FLUAD) - Quadrivalent (Adjuvanted) *Preferred* (65+) (PF)    Hemoglobin A1C    Potassium    levothyroxine (SYNTHROID) 137 MCG Tab tablet        Medication List with Changes/Refills   New Medications    LEVOTHYROXINE (SYNTHROID) 137 MCG TAB TABLET    Take 1 tablet (137 mcg total) by mouth before breakfast.   Current Medications    AMIODARONE (PACERONE) 200 MG TAB    Take 200 mg by mouth once daily.    ASCORBIC ACID, VITAMIN C, (VITAMIN C) 500 MG TABLET    Take 500 mg by mouth once daily.    AZITHROMYCIN (Z-MANSI) 250 MG TABLET    Take 2 tablets by mouth on day 1; Take 1 tablet by mouth on days 2-5    CHOLECALCIFEROL, VITAMIN D3, (VITAMIN D3) 50 MCG (2,000 UNIT) CAP CAPSULE    Take 5,000 Units by mouth once daily at 6am.    CLOPIDOGREL (PLAVIX) 75 MG TABLET    Take 40 mg by mouth once daily.    FLUTICASONE PROPIONATE (FLONASE) 50 MCG/ACTUATION NASAL SPRAY    1 spray (50 mcg total) by Each Nostril route 2 (two) times daily.    FUROSEMIDE (LASIX) 40 MG TABLET    Take 1 tablet (40 mg total) by mouth 2 (two) times daily.    METFORMIN (GLUCOPHAGE) 500 MG TABLET    Take 1 tablet (500 mg total) by mouth once daily.    METFORMIN (GLUCOPHAGE) 500 MG TABLET    Patient to take a 1000 mg in the morning 500 mg in the evening    METOLAZONE (ZAROXOLYN) 5 MG TABLET    Take 5 mg by mouth daily as needed.    METOPROLOL TARTRATE (LOPRESSOR) 100 MG TABLET    Take 100 mg by mouth 2 (two) times daily.    MULTIVIT-MIN/FERROUS FUMARATE (MULTI VITAMIN ORAL)    Take 1 tablet by mouth Daily.    NITROGLYCERIN (NITROSTAT) 0.4 MG SL TABLET    Place 0.4 mg under the tongue daily as needed.    ROSUVASTATIN (CRESTOR) 40 MG TAB    Take 40 mg by mouth nightly.   Changed and/or Refilled Medications     Modified Medication Previous Medication    WARFARIN (COUMADIN) 2.5 MG TABLET warfarin (COUMADIN) 5 MG tablet       TAKE 1 TABLET(2.5 MG) BY MOUTH DAILY. EXCEPT WEDNESDAY AND SATURDAY    Take 2.5 mg by mouth Daily. EXCEPT  Wednesday AND SAT 5   Discontinued Medications    LEVOTHYROXINE (SYNTHROID, LEVOTHROID) 175 MCG TABLET    Take 1 tablet (175 mcg total) by mouth once daily.

## 2022-10-20 NOTE — ASSESSMENT & PLAN NOTE
- K level 3.0, repeat potassium level  -noted currently on furosemide for CHF  -consider potassium supplement if persistently low

## 2022-10-20 NOTE — ASSESSMENT & PLAN NOTE
-currently on warfarin followed by Cardiology  -apparently patient with pharmacy complications/ trouble filling warfarin, refill

## 2022-10-20 NOTE — ASSESSMENT & PLAN NOTE
Previous HGB A1c 9 (July 2022), repeat HbA1c lab today   -last visit had metformin increased to 1000 mg a.m./500 mg p.m., continue for now titrate & as needed   -consider incorporating Jardiance or Farxiga due to HX CHF as well as DM

## 2022-10-20 NOTE — ASSESSMENT & PLAN NOTE
Lab Results   Component Value Date    TSH 0.1423 (L) 10/03/2022   -currently on levothyroxine 150 mcg daily, decreased to 137 mcg and repeat TSH for next visit

## 2022-10-31 ENCOUNTER — ANTI-COAG VISIT (OUTPATIENT)
Dept: CARDIOLOGY | Facility: HOSPITAL | Age: 67
End: 2022-10-31
Payer: MEDICARE

## 2022-10-31 DIAGNOSIS — Z79.01 LONG TERM (CURRENT) USE OF ANTICOAGULANTS: ICD-10-CM

## 2022-10-31 DIAGNOSIS — Z95.2 H/O MECHANICAL AORTIC VALVE REPLACEMENT: Primary | ICD-10-CM

## 2022-10-31 LAB
CTP QC/QA: YES
INR PPP: 1.7 (ref 2–3)
PROTHROMBIN TIME, POC: 19.9 (ref 2–3)

## 2022-10-31 PROCEDURE — 85610 PROTHROMBIN TIME: CPT

## 2022-10-31 NOTE — PROGRESS NOTES
Pt will take 5 mg of warfarin for 2 days then return for retest.  He recently changed dosage from a 5 mg tablet to a 2.5 mg tablet.

## 2022-11-02 ENCOUNTER — ANTI-COAG VISIT (OUTPATIENT)
Dept: CARDIOLOGY | Facility: HOSPITAL | Age: 67
End: 2022-11-02
Payer: MEDICARE

## 2022-11-02 DIAGNOSIS — Z95.2 H/O MECHANICAL AORTIC VALVE REPLACEMENT: Primary | ICD-10-CM

## 2022-11-02 DIAGNOSIS — Z79.01 LONG TERM (CURRENT) USE OF ANTICOAGULANTS: ICD-10-CM

## 2022-11-02 LAB
CTP QC/QA: YES
INR PPP: 2.5 (ref 2–3)
PROTHROMBIN TIME, POC: 29.9 (ref 2–3)

## 2022-11-02 PROCEDURE — 85610 PROTHROMBIN TIME: CPT

## 2022-11-02 PROCEDURE — 99211 OFF/OP EST MAY X REQ PHY/QHP: CPT

## 2022-11-17 ENCOUNTER — ANTI-COAG VISIT (OUTPATIENT)
Dept: CARDIOLOGY | Facility: HOSPITAL | Age: 67
End: 2022-11-17
Payer: MEDICARE

## 2022-11-17 DIAGNOSIS — Z95.2 H/O MECHANICAL AORTIC VALVE REPLACEMENT: Primary | ICD-10-CM

## 2022-11-17 DIAGNOSIS — Z79.01 LONG TERM (CURRENT) USE OF ANTICOAGULANTS: ICD-10-CM

## 2022-11-17 LAB
CTP QC/QA: YES
INR PPP: 3 (ref 2–3)
PROTHROMBIN TIME, POC: 36.1 (ref 2–3)

## 2022-11-17 PROCEDURE — 85610 PROTHROMBIN TIME: CPT

## 2022-11-21 ENCOUNTER — LAB VISIT (OUTPATIENT)
Dept: LAB | Facility: HOSPITAL | Age: 67
End: 2022-11-21
Payer: MEDICARE

## 2022-11-21 ENCOUNTER — TELEPHONE (OUTPATIENT)
Dept: INTERNAL MEDICINE | Facility: CLINIC | Age: 67
End: 2022-11-21
Payer: MEDICARE

## 2022-11-21 DIAGNOSIS — E11.9 TYPE 2 DIABETES MELLITUS WITHOUT COMPLICATION, WITHOUT LONG-TERM CURRENT USE OF INSULIN: ICD-10-CM

## 2022-11-21 DIAGNOSIS — E87.6 HYPOKALEMIA: ICD-10-CM

## 2022-11-21 DIAGNOSIS — E87.6 HYPOKALEMIA: Primary | ICD-10-CM

## 2022-11-21 LAB
EST. AVERAGE GLUCOSE BLD GHB EST-MCNC: 139.9 MG/DL
HBA1C MFR BLD: 6.5 %
POTASSIUM SERPL-SCNC: 3.4 MMOL/L (ref 3.5–5.1)

## 2022-11-21 PROCEDURE — 84132 ASSAY OF SERUM POTASSIUM: CPT

## 2022-11-21 PROCEDURE — 36415 COLL VENOUS BLD VENIPUNCTURE: CPT

## 2022-11-21 PROCEDURE — 83036 HEMOGLOBIN GLYCOSYLATED A1C: CPT

## 2022-11-21 RX ORDER — POTASSIUM CHLORIDE 20 MEQ/1
20 TABLET, EXTENDED RELEASE ORAL DAILY
Qty: 30 TABLET | Refills: 3 | Status: SHIPPED | OUTPATIENT
Start: 2022-11-21 | End: 2023-01-26 | Stop reason: ALTCHOICE

## 2022-11-21 NOTE — TELEPHONE ENCOUNTER
-potassium notable noted 3.4 (11/21/2022), previously 3.0 (10/03/2022).    -noted currently on furosemide 40 mg b.i.d..    -will initiate on potassium chloride 20 mEq daily

## 2022-12-08 ENCOUNTER — ANTI-COAG VISIT (OUTPATIENT)
Dept: CARDIOLOGY | Facility: HOSPITAL | Age: 67
End: 2022-12-08
Payer: MEDICARE

## 2022-12-08 DIAGNOSIS — Z79.01 LONG TERM (CURRENT) USE OF ANTICOAGULANTS: ICD-10-CM

## 2022-12-08 DIAGNOSIS — Z95.2 H/O MECHANICAL AORTIC VALVE REPLACEMENT: Primary | ICD-10-CM

## 2022-12-08 LAB
CTP QC/QA: YES
INR PPP: 2.5 (ref 2–3)
PROTHROMBIN TIME, POC: 30.3 (ref 2–3)

## 2022-12-08 PROCEDURE — 85610 PROTHROMBIN TIME: CPT

## 2022-12-08 PROCEDURE — 99211 OFF/OP EST MAY X REQ PHY/QHP: CPT

## 2022-12-22 ENCOUNTER — DOCUMENTATION ONLY (OUTPATIENT)
Dept: ADMINISTRATIVE | Facility: HOSPITAL | Age: 67
End: 2022-12-22
Payer: MEDICARE

## 2023-01-05 ENCOUNTER — ANTI-COAG VISIT (OUTPATIENT)
Dept: CARDIOLOGY | Facility: HOSPITAL | Age: 68
End: 2023-01-05
Payer: MEDICARE

## 2023-01-05 DIAGNOSIS — Z95.2 H/O MECHANICAL AORTIC VALVE REPLACEMENT: Primary | ICD-10-CM

## 2023-01-05 DIAGNOSIS — Z79.01 LONG TERM (CURRENT) USE OF ANTICOAGULANTS: ICD-10-CM

## 2023-01-05 LAB
CTP QC/QA: YES
INR PPP: 3.7 (ref 2–3)
PROTHROMBIN TIME, POC: 44.8 (ref 2–3)

## 2023-01-05 PROCEDURE — 99211 OFF/OP EST MAY X REQ PHY/QHP: CPT

## 2023-01-05 PROCEDURE — 85610 PROTHROMBIN TIME: CPT

## 2023-01-11 DIAGNOSIS — E87.6 HYPOKALEMIA: ICD-10-CM

## 2023-01-11 DIAGNOSIS — E03.9 HYPOTHYROIDISM, UNSPECIFIED TYPE: Primary | ICD-10-CM

## 2023-01-11 DIAGNOSIS — I50.32 CHF (CONGESTIVE HEART FAILURE), NYHA CLASS I, CHRONIC, DIASTOLIC: ICD-10-CM

## 2023-01-11 DIAGNOSIS — E11.9 TYPE 2 DIABETES MELLITUS WITHOUT COMPLICATION, WITHOUT LONG-TERM CURRENT USE OF INSULIN: ICD-10-CM

## 2023-01-24 ENCOUNTER — LAB VISIT (OUTPATIENT)
Dept: LAB | Facility: HOSPITAL | Age: 68
End: 2023-01-24
Attending: INTERNAL MEDICINE
Payer: MEDICARE

## 2023-01-24 DIAGNOSIS — E03.9 HYPOTHYROIDISM, UNSPECIFIED TYPE: ICD-10-CM

## 2023-01-24 DIAGNOSIS — E11.9 TYPE 2 DIABETES MELLITUS WITHOUT COMPLICATION, WITHOUT LONG-TERM CURRENT USE OF INSULIN: ICD-10-CM

## 2023-01-24 DIAGNOSIS — I50.32 CHF (CONGESTIVE HEART FAILURE), NYHA CLASS I, CHRONIC, DIASTOLIC: ICD-10-CM

## 2023-01-24 DIAGNOSIS — E87.6 HYPOKALEMIA: ICD-10-CM

## 2023-01-24 LAB
ABS NEUT (OLG): 6.84 X10(3)/MCL (ref 2.1–9.2)
ACANTHOCYTES (OLG): ABNORMAL
ALBUMIN SERPL-MCNC: 4.1 G/DL (ref 3.4–4.8)
ALBUMIN/GLOB SERPL: 1.4 RATIO (ref 1.1–2)
ALP SERPL-CCNC: 101 UNIT/L (ref 40–150)
ALT SERPL-CCNC: 59 UNIT/L (ref 0–55)
ANISOCYTOSIS BLD QL SMEAR: ABNORMAL
AST SERPL-CCNC: 55 UNIT/L (ref 5–34)
BASOPHILS NFR BLD MANUAL: 0.38 X10(3)/MCL (ref 0–0.2)
BASOPHILS NFR BLD MANUAL: 4 %
BILIRUBIN DIRECT+TOT PNL SERPL-MCNC: 0.9 MG/DL
BUN SERPL-MCNC: 23.3 MG/DL (ref 8.4–25.7)
CALCIUM SERPL-MCNC: 9.6 MG/DL (ref 8.8–10)
CHLORIDE SERPL-SCNC: 99 MMOL/L (ref 98–107)
CO2 SERPL-SCNC: 31 MMOL/L (ref 23–31)
CREAT SERPL-MCNC: 1.73 MG/DL (ref 0.73–1.18)
EOSINOPHIL NFR BLD MANUAL: 0.76 X10(3)/MCL (ref 0–0.9)
EOSINOPHIL NFR BLD MANUAL: 8 %
ERYTHROCYTE [DISTWIDTH] IN BLOOD BY AUTOMATED COUNT: 19.1 % (ref 11.5–17)
EST. AVERAGE GLUCOSE BLD GHB EST-MCNC: 119.8 MG/DL
GFR SERPLBLD CREATININE-BSD FMLA CKD-EPI: 42 MLS/MIN/1.73/M2
GLOBULIN SER-MCNC: 2.9 GM/DL (ref 2.4–3.5)
GLUCOSE SERPL-MCNC: 93 MG/DL (ref 82–115)
HBA1C MFR BLD: 5.8 %
HCT VFR BLD AUTO: 40.5 % (ref 42–52)
HGB BLD-MCNC: 12.2 GM/DL (ref 14–18)
HYPOCHROMIA BLD QL SMEAR: ABNORMAL
IMM GRANULOCYTES # BLD AUTO: 0.04 X10(3)/MCL (ref 0–0.04)
IMM GRANULOCYTES NFR BLD AUTO: 0.4 %
INSTRUMENT WBC (OLG): 9.5 X10(3)/MCL
LYMPHOCYTES NFR BLD MANUAL: 0.47 X10(3)/MCL
LYMPHOCYTES NFR BLD MANUAL: 5 %
MCH RBC QN AUTO: 25.6 PG
MCHC RBC AUTO-ENTMCNC: 30.1 MG/DL (ref 33–36)
MCV RBC AUTO: 85.1 FL (ref 80–94)
MONOCYTES NFR BLD MANUAL: 0.95 X10(3)/MCL (ref 0.1–1.3)
MONOCYTES NFR BLD MANUAL: 10 %
NEUTROPHILS NFR BLD MANUAL: 72 %
NRBC BLD AUTO-RTO: 0 %
OVALOCYTES (OLG): ABNORMAL
PLATELET # BLD AUTO: 449 X10(3)/MCL (ref 130–400)
PLATELET # BLD EST: ABNORMAL 10*3/UL
PMV BLD AUTO: 9.8 FL (ref 7.4–10.4)
POIKILOCYTOSIS BLD QL SMEAR: ABNORMAL
POTASSIUM SERPL-SCNC: 3.9 MMOL/L (ref 3.5–5.1)
PROT SERPL-MCNC: 7 GM/DL (ref 5.8–7.6)
RBC # BLD AUTO: 4.76 X10(6)/MCL (ref 4.7–6.1)
RBC MORPH BLD: ABNORMAL
SODIUM SERPL-SCNC: 140 MMOL/L (ref 136–145)
TARGETS BLD QL SMEAR: ABNORMAL
TSH SERPL-ACNC: 0.23 UIU/ML (ref 0.35–4.94)
WBC # SPEC AUTO: 9.5 X10(3)/MCL (ref 4.5–11.5)

## 2023-01-24 PROCEDURE — 84443 ASSAY THYROID STIM HORMONE: CPT

## 2023-01-24 PROCEDURE — 36415 COLL VENOUS BLD VENIPUNCTURE: CPT

## 2023-01-24 PROCEDURE — 83036 HEMOGLOBIN GLYCOSYLATED A1C: CPT

## 2023-01-24 PROCEDURE — 80053 COMPREHEN METABOLIC PANEL: CPT

## 2023-01-24 PROCEDURE — 85027 COMPLETE CBC AUTOMATED: CPT

## 2023-01-26 ENCOUNTER — OFFICE VISIT (OUTPATIENT)
Dept: INTERNAL MEDICINE | Facility: CLINIC | Age: 68
End: 2023-01-26
Payer: MEDICARE

## 2023-01-26 VITALS
HEART RATE: 62 BPM | HEIGHT: 72 IN | WEIGHT: 179 LBS | BODY MASS INDEX: 24.24 KG/M2 | SYSTOLIC BLOOD PRESSURE: 118 MMHG | OXYGEN SATURATION: 98 % | DIASTOLIC BLOOD PRESSURE: 62 MMHG

## 2023-01-26 DIAGNOSIS — E87.6 HYPOKALEMIA: Chronic | ICD-10-CM

## 2023-01-26 DIAGNOSIS — C85.90 NON-HODGKIN'S LYMPHOMA, UNSPECIFIED BODY REGION, UNSPECIFIED NON-HODGKIN LYMPHOMA TYPE: ICD-10-CM

## 2023-01-26 DIAGNOSIS — N18.32 STAGE 3B CHRONIC KIDNEY DISEASE: ICD-10-CM

## 2023-01-26 DIAGNOSIS — E03.9 HYPOTHYROIDISM, UNSPECIFIED TYPE: ICD-10-CM

## 2023-01-26 DIAGNOSIS — I48.0 PAROXYSMAL ATRIAL FIBRILLATION: Chronic | ICD-10-CM

## 2023-01-26 DIAGNOSIS — E78.5 HYPERLIPIDEMIA, UNSPECIFIED HYPERLIPIDEMIA TYPE: Chronic | ICD-10-CM

## 2023-01-26 DIAGNOSIS — E11.9 TYPE 2 DIABETES MELLITUS WITHOUT COMPLICATION, WITHOUT LONG-TERM CURRENT USE OF INSULIN: Primary | Chronic | ICD-10-CM

## 2023-01-26 PROCEDURE — 99213 PR OFFICE/OUTPT VISIT, EST, LEVL III, 20-29 MIN: ICD-10-PCS | Mod: ,,, | Performed by: INTERNAL MEDICINE

## 2023-01-26 PROCEDURE — 99213 OFFICE O/P EST LOW 20 MIN: CPT | Mod: ,,, | Performed by: INTERNAL MEDICINE

## 2023-01-26 RX ORDER — METOPROLOL TARTRATE 50 MG/1
50 TABLET ORAL 2 TIMES DAILY
Qty: 60 TABLET | Refills: 11 | Status: SHIPPED | OUTPATIENT
Start: 2023-01-26 | End: 2023-11-21

## 2023-01-26 RX ORDER — LEVOTHYROXINE SODIUM 112 UG/1
112 TABLET ORAL
Qty: 30 TABLET | Refills: 11 | Status: SHIPPED | OUTPATIENT
Start: 2023-01-26 | End: 2024-02-01

## 2023-01-26 RX ORDER — POTASSIUM CHLORIDE 750 MG/1
10 TABLET, EXTENDED RELEASE ORAL 2 TIMES DAILY
Qty: 60 TABLET | Refills: 11 | Status: SHIPPED | OUTPATIENT
Start: 2023-01-26 | End: 2023-11-20

## 2023-01-26 RX ORDER — HYDROCORTISONE ACETATE SUPPOSITORY 30 MG/1
30 SUPPOSITORY RECTAL 2 TIMES DAILY
Qty: 24 EACH | Refills: 12 | Status: SHIPPED | OUTPATIENT
Start: 2023-01-26 | End: 2023-02-05

## 2023-01-26 NOTE — PROGRESS NOTES
Cory Watts MD        PATIENT NAME: Bertin Alonzo  : 1955  DATE: 23  MRN: [id number]      Billing Provider: Cory Watts MD  Level of Service: IN OFFICE/OUTPT VISIT, EST, LEVL III, 20-29 MIN  Patient PCP Information       Provider PCP Type    Cory Watts MD General            Reason for Visit / Chief Complaint: Medicare AWV Follow Up (3m)       Update PCP  Update Chief Complaint         History of Present Illness / Problem Focused Workflow     Bertin Alonzo presents to the clinic with Medicare AWV Follow Up (3m)     Is here for his follow-up for his thyroid diabetes   Overall he is doing a few complaints 1st he has type just 8 his blood pressure drops he falls asleep in the chair secondly he is having pain in this thumb 30 like to change his potassium to twice a day 10 mg is the tablets her.      Review of Systems   Review of Systems   Constitutional: Negative.    HENT: Negative.     Eyes: Negative.    Respiratory: Negative.     Cardiovascular: Negative.    Gastrointestinal: Negative.    Endocrine: Negative.    Genitourinary: Negative.    Musculoskeletal: Negative.    Integumentary:  Negative.   Neurological: Negative.    Psychiatric/Behavioral: Negative.        Medical / Social / Family History     Past Medical History:   Diagnosis Date    CAD (coronary artery disease)     CHF (congestive heart failure), NYHA class I, chronic, diastolic     Dysphagia     Hodgkin's disease     Hypothyroidism, unspecified     Paroxysmal atrial fibrillation     Type 2 diabetes mellitus without complication, without long-term current use of insulin        Past Surgical History:   Procedure Laterality Date    AORTIC VALUE      APPENDECTOMY      BYPASS OF MESENTERIC ARTERY      CARDIAC VALVE REPLACEMENT  2006    CATARACT EXTRACTION, BILATERAL      CORONARY ARTERY BYPASS GRAFT  2021    EYE SURGERY      Cataracts    PLEURODESIS USING TALC      SPLENECTOMY, TOTAL      STENTS       CORONARY    THYROID SURGERY         Social History  Mr. Alonzo  reports that he has never smoked. He has never used smokeless tobacco. He reports that he does not currently use alcohol after a past usage of about 1.0 standard drink per week. He reports that he does not use drugs.    Family History  'anneliese Alonzo  family history includes Cancer in his father; Hodgkin's lymphoma in his mother.    Medications and Allergies     Medications  Outpatient Medications Marked as Taking for the 1/26/23 encounter (Office Visit) with Cory Villanueva MD   Medication Sig Dispense Refill    amiodarone (PACERONE) 200 MG Tab Take 200 mg by mouth once daily.      ascorbic acid, vitamin C, (VITAMIN C) 500 MG tablet Take 500 mg by mouth once daily.      cholecalciferol, vitamin D3, (VITAMIN D3) 50 mcg (2,000 unit) Cap capsule Take 5,000 Units by mouth once daily at 6am.      clopidogreL (PLAVIX) 75 mg tablet Take 75 mg by mouth once daily.      furosemide (LASIX) 40 MG tablet Take 1 tablet (40 mg total) by mouth 2 (two) times daily. 180 tablet 3    metFORMIN (GLUCOPHAGE) 500 MG tablet Take 1 tablet (500 mg total) by mouth once daily. (Patient taking differently: Take 500 mg by mouth 3 (three) times daily. 1000mg in Am and 500mg nightly) 30 tablet 12    metOLazone (ZAROXOLYN) 5 MG tablet Take 5 mg by mouth daily as needed.      multivit-min/ferrous fumarate (MULTI VITAMIN ORAL) Take 1 tablet by mouth Daily.      nitroGLYCERIN (NITROSTAT) 0.4 MG SL tablet Place 0.4 mg under the tongue daily as needed.      rosuvastatin (CRESTOR) 40 MG Tab Take 40 mg by mouth nightly.      warfarin (COUMADIN) 2.5 MG tablet TAKE 1 TABLET(2.5 MG) BY MOUTH DAILY. EXCEPT WEDNESDAY AND SATURDAY (Patient taking differently: Take 2.5 mg by mouth every Tuesday, Thursday, Saturday, Sunday. 5mg on Mon, Wed., Fri) 90 tablet 1    [DISCONTINUED] levothyroxine (SYNTHROID) 137 MCG Tab tablet Take 1 tablet (137 mcg total) by mouth before breakfast. 90 tablet 1     [DISCONTINUED] metoprolol tartrate (LOPRESSOR) 100 MG tablet Take 100 mg by mouth 2 (two) times daily.      [DISCONTINUED] potassium chloride SA (K-DUR,KLOR-CON) 20 MEQ tablet Take 1 tablet (20 mEq total) by mouth once daily. 30 tablet 3       Allergies  Review of patient's allergies indicates:   Allergen Reactions    Digoxin      Other reaction(s): Toxicity  Other reaction(s): Toxicity  Other reaction(s): Toxicity       Physical Examination     Vitals:    01/26/23 1451   BP: 118/62   Pulse: 62     Physical Exam  Vitals reviewed.   Constitutional:       Appearance: Normal appearance.   HENT:      Head: Normocephalic.      Right Ear: Tympanic membrane normal.      Left Ear: Tympanic membrane normal.      Nose: Nose normal.      Mouth/Throat:      Pharynx: Oropharynx is clear.   Eyes:      Extraocular Movements: Extraocular movements intact.      Pupils: Pupils are equal, round, and reactive to light.   Cardiovascular:      Rate and Rhythm: Normal rate and regular rhythm.      Pulses: Normal pulses.      Heart sounds: Normal heart sounds.   Pulmonary:      Effort: Pulmonary effort is normal.      Breath sounds: Normal breath sounds.   Abdominal:      General: Abdomen is flat. Bowel sounds are normal.      Palpations: Abdomen is soft.   Musculoskeletal:         General: Normal range of motion.      Cervical back: Normal range of motion.      Comments: Left thumb pain   Skin:     General: Skin is warm and dry.   Neurological:      General: No focal deficit present.      Mental Status: He is alert and oriented to person, place, and time.   Psychiatric:         Mood and Affect: Mood normal.         Behavior: Behavior normal.        Assessment and Plan (including Health Maintenance)      Problem List  Smart Sets  Document Outside HM   :    Plan:   Type 2 diabetes mellitus without complication, without long-term current use of insulin    Paroxysmal atrial fibrillation    Hyperlipidemia, unspecified hyperlipidemia  type    Non-Hodgkin's lymphoma, unspecified body region, unspecified non-Hodgkin lymphoma type    Stage 3b chronic kidney disease    Hypothyroidism, unspecified type    Hypokalemia     Discussion on all lab results   TSH is low will adjust his thyroid medicine down to the Synthroid 0.112 2. Decrease metoprolol to 50 mg twice a day due to hypotension and lethargy 3. Recommend a thumb splint for his thumb pain  He will repeat his laboratory in 3 months he had slight elevation of his liver function studies that may be due to his hyper thyroid state.  He will be scheduled to come back           Health Maintenance Due   Topic Date Due    Hepatitis C Screening  Never done    Diabetes Urine Screening  Never done    Foot Exam  Never done    TETANUS VACCINE  Never done    Pneumococcal Vaccines (Age 65+) (3 - PPSV23 if available, else PCV20) 12/04/2020    Shingles Vaccine (3 of 3) 06/06/2022       Problem List Items Addressed This Visit          Cardiac/Vascular    Paroxysmal atrial fibrillation (Chronic)    Hyperlipidemia (Chronic)       Renal/    Hypokalemia (Chronic)    Stage 3b chronic kidney disease (Chronic)       Oncology    Non-Hodgkin's lymphoma, unspecified body region, unspecified non-Hodgkin lymphoma type (Chronic)       Endocrine    Hypothyroidism, unspecified (Chronic)    Type 2 diabetes mellitus without complication, without long-term current use of insulin - Primary (Chronic)       Health Maintenance Topics with due status: Not Due       Topic Last Completion Date    Colorectal Cancer Screening 03/20/2014    Lipid Panel 07/06/2022    Eye Exam 08/30/2022    High Dose Statin 10/19/2022    Hemoglobin A1c 01/24/2023       Future Appointments   Date Time Provider Department Center   4/12/2023  9:40 AM NURSE PRACTITIONER, [facility]            Signature:  Cory Watts MD  [facility]  [address] [facility] [id number]    Date of encounter:  1/26/23

## 2023-02-23 ENCOUNTER — ANTI-COAG VISIT (OUTPATIENT)
Dept: CARDIOLOGY | Facility: HOSPITAL | Age: 68
End: 2023-02-23
Payer: MEDICARE

## 2023-02-23 DIAGNOSIS — Z95.2 H/O MECHANICAL AORTIC VALVE REPLACEMENT: Primary | ICD-10-CM

## 2023-02-23 DIAGNOSIS — Z79.01 LONG TERM (CURRENT) USE OF ANTICOAGULANTS: ICD-10-CM

## 2023-02-23 LAB
CTP QC/QA: YES
INR PPP: 2.1 (ref 2–3)
PROTHROMBIN TIME, POC: 25.5 (ref 2–3)

## 2023-02-23 PROCEDURE — 85610 PROTHROMBIN TIME: CPT

## 2023-02-23 PROCEDURE — 99211 OFF/OP EST MAY X REQ PHY/QHP: CPT

## 2023-02-28 ENCOUNTER — TELEPHONE (OUTPATIENT)
Dept: INTERNAL MEDICINE | Facility: CLINIC | Age: 68
End: 2023-02-28
Payer: MEDICARE

## 2023-02-28 DIAGNOSIS — I50.32 CHF (CONGESTIVE HEART FAILURE), NYHA CLASS I, CHRONIC, DIASTOLIC: ICD-10-CM

## 2023-02-28 DIAGNOSIS — Z79.01 ANTICOAGULANT LONG-TERM USE: Primary | ICD-10-CM

## 2023-02-28 RX ORDER — WARFARIN 2.5 MG/1
2.5 TABLET ORAL
Qty: 135 TABLET | Refills: 3 | Status: SHIPPED | OUTPATIENT
Start: 2023-02-28

## 2023-02-28 NOTE — TELEPHONE ENCOUNTER
----- Message from Chichi Tong sent at 2/28/2023 11:03 AM CST -----  Regarding: refill  Needs warfarin 2.5 mg tables  quantity of 135 for 90 day script.  [facility] [phone]/[phone] his callback is [phone]

## 2023-03-09 ENCOUNTER — ANTI-COAG VISIT (OUTPATIENT)
Dept: CARDIOLOGY | Facility: HOSPITAL | Age: 68
End: 2023-03-09
Payer: MEDICARE

## 2023-03-09 DIAGNOSIS — Z79.01 LONG TERM (CURRENT) USE OF ANTICOAGULANTS: ICD-10-CM

## 2023-03-09 DIAGNOSIS — Z95.2 H/O MECHANICAL AORTIC VALVE REPLACEMENT: Primary | ICD-10-CM

## 2023-03-09 LAB
CTP QC/QA: YES
INR PPP: 2 (ref 2–3)
PROTHROMBIN TIME, POC: 24.5 (ref 2–3)

## 2023-03-09 PROCEDURE — 85610 PROTHROMBIN TIME: CPT

## 2023-03-09 PROCEDURE — 99211 OFF/OP EST MAY X REQ PHY/QHP: CPT

## 2023-03-14 ENCOUNTER — ANTI-COAG VISIT (OUTPATIENT)
Dept: CARDIOLOGY | Facility: HOSPITAL | Age: 68
End: 2023-03-14
Payer: MEDICARE

## 2023-03-14 DIAGNOSIS — Z95.2 H/O MECHANICAL AORTIC VALVE REPLACEMENT: Primary | ICD-10-CM

## 2023-03-14 DIAGNOSIS — Z79.01 LONG TERM (CURRENT) USE OF ANTICOAGULANTS: ICD-10-CM

## 2023-03-14 LAB
CTP QC/QA: YES
INR PPP: 2.1 (ref 2–3)
PROTHROMBIN TIME, POC: 25.2 (ref 2–3)

## 2023-03-14 PROCEDURE — 85610 PROTHROMBIN TIME: CPT

## 2023-03-30 ENCOUNTER — ANTI-COAG VISIT (OUTPATIENT)
Dept: CARDIOLOGY | Facility: HOSPITAL | Age: 68
End: 2023-03-30
Payer: MEDICARE

## 2023-03-30 DIAGNOSIS — Z95.2 H/O MECHANICAL AORTIC VALVE REPLACEMENT: Primary | ICD-10-CM

## 2023-03-30 DIAGNOSIS — Z79.01 LONG TERM (CURRENT) USE OF ANTICOAGULANTS: ICD-10-CM

## 2023-03-30 LAB
CTP QC/QA: YES
INR PPP: 4.4 (ref 2–3)
PROTHROMBIN TIME, POC: 53.1 (ref 2–3)

## 2023-03-30 PROCEDURE — 85610 PROTHROMBIN TIME: CPT

## 2023-04-13 ENCOUNTER — ANTI-COAG VISIT (OUTPATIENT)
Dept: CARDIOLOGY | Facility: HOSPITAL | Age: 68
End: 2023-04-13
Payer: MEDICARE

## 2023-04-13 DIAGNOSIS — Z95.2 H/O MECHANICAL AORTIC VALVE REPLACEMENT: ICD-10-CM

## 2023-04-13 DIAGNOSIS — Z79.01 LONG TERM (CURRENT) USE OF ANTICOAGULANTS: Primary | ICD-10-CM

## 2023-04-13 LAB
INR BLD: 4.25 (ref 0–1.3)
PROTHROMBIN TIME: 39.9 SECONDS (ref 12.5–14.5)

## 2023-04-13 PROCEDURE — 36415 COLL VENOUS BLD VENIPUNCTURE: CPT

## 2023-04-13 PROCEDURE — 85610 PROTHROMBIN TIME: CPT

## 2023-04-17 ENCOUNTER — ANTI-COAG VISIT (OUTPATIENT)
Dept: CARDIOLOGY | Facility: HOSPITAL | Age: 68
End: 2023-04-17
Payer: MEDICARE

## 2023-04-17 DIAGNOSIS — Z95.2 H/O MECHANICAL AORTIC VALVE REPLACEMENT: Primary | ICD-10-CM

## 2023-04-17 DIAGNOSIS — Z79.01 LONG TERM (CURRENT) USE OF ANTICOAGULANTS: ICD-10-CM

## 2023-04-17 LAB
CTP QC/QA: YES
INR PPP: 4 (ref 2–3)
PROTHROMBIN TIME, POC: 47.7 (ref 2–3)

## 2023-04-17 PROCEDURE — 85610 PROTHROMBIN TIME: CPT

## 2023-04-27 ENCOUNTER — ANTI-COAG VISIT (OUTPATIENT)
Dept: CARDIOLOGY | Facility: HOSPITAL | Age: 68
End: 2023-04-27
Payer: MEDICARE

## 2023-04-27 DIAGNOSIS — Z79.01 LONG TERM (CURRENT) USE OF ANTICOAGULANTS: ICD-10-CM

## 2023-04-27 DIAGNOSIS — Z95.2 H/O MECHANICAL AORTIC VALVE REPLACEMENT: Primary | ICD-10-CM

## 2023-04-27 LAB
CTP QC/QA: YES
INR PPP: 2.6 (ref 2–3)
PROTHROMBIN TIME, POC: 31.2 (ref 2–3)

## 2023-04-27 PROCEDURE — 85610 PROTHROMBIN TIME: CPT

## 2023-05-11 ENCOUNTER — ANTI-COAG VISIT (OUTPATIENT)
Dept: CARDIOLOGY | Facility: HOSPITAL | Age: 68
End: 2023-05-11
Payer: MEDICARE

## 2023-05-11 DIAGNOSIS — Z79.01 LONG TERM (CURRENT) USE OF ANTICOAGULANTS: ICD-10-CM

## 2023-05-11 DIAGNOSIS — Z95.2 H/O MECHANICAL AORTIC VALVE REPLACEMENT: Primary | ICD-10-CM

## 2023-05-11 LAB
CTP QC/QA: YES
INR PPP: 2.4 (ref 2–3)
PROTHROMBIN TIME, POC: 29.3 (ref 2–3)

## 2023-05-11 PROCEDURE — 85610 PROTHROMBIN TIME: CPT

## 2023-05-11 PROCEDURE — 99211 OFF/OP EST MAY X REQ PHY/QHP: CPT

## 2023-05-25 ENCOUNTER — ANTI-COAG VISIT (OUTPATIENT)
Dept: CARDIOLOGY | Facility: HOSPITAL | Age: 68
End: 2023-05-25
Payer: MEDICARE

## 2023-05-25 DIAGNOSIS — Z79.01 LONG TERM (CURRENT) USE OF ANTICOAGULANTS: Primary | ICD-10-CM

## 2023-05-25 DIAGNOSIS — Z95.2 H/O MECHANICAL AORTIC VALVE REPLACEMENT: ICD-10-CM

## 2023-05-25 LAB
CTP QC/QA: YES
INR PPP: 4.9 (ref 2–3)
PROTHROMBIN TIME, POC: 58.5 (ref 2–3)

## 2023-05-25 PROCEDURE — 85610 PROTHROMBIN TIME: CPT

## 2023-06-19 ENCOUNTER — ANTI-COAG VISIT (OUTPATIENT)
Dept: CARDIOLOGY | Facility: HOSPITAL | Age: 68
End: 2023-06-19
Payer: MEDICARE

## 2023-06-19 DIAGNOSIS — Z95.2 H/O MECHANICAL AORTIC VALVE REPLACEMENT: Primary | ICD-10-CM

## 2023-06-19 DIAGNOSIS — Z79.01 LONG TERM (CURRENT) USE OF ANTICOAGULANTS: ICD-10-CM

## 2023-06-19 LAB
CTP QC/QA: YES
INR PPP: 4.3 (ref 2–3)
PROTHROMBIN TIME, POC: 51.9 (ref 2–3)

## 2023-06-19 PROCEDURE — 85610 PROTHROMBIN TIME: CPT

## 2023-06-19 PROCEDURE — 99211 OFF/OP EST MAY X REQ PHY/QHP: CPT

## 2023-06-19 NOTE — PROGRESS NOTES
POC pt/inr performed.  Hold warfarin today, then resume.     
Less than 8 weeks gestation of pregnancy

## 2023-06-26 ENCOUNTER — TELEPHONE (OUTPATIENT)
Dept: INTERNAL MEDICINE | Facility: CLINIC | Age: 68
End: 2023-06-26
Payer: MEDICARE

## 2023-06-30 DIAGNOSIS — E11.9 TYPE 2 DIABETES MELLITUS WITHOUT COMPLICATION, WITHOUT LONG-TERM CURRENT USE OF INSULIN: Primary | Chronic | ICD-10-CM

## 2023-07-05 ENCOUNTER — ANTI-COAG VISIT (OUTPATIENT)
Dept: CARDIOLOGY | Facility: HOSPITAL | Age: 68
End: 2023-07-05
Payer: MEDICARE

## 2023-07-05 DIAGNOSIS — Z79.01 LONG TERM (CURRENT) USE OF ANTICOAGULANTS: ICD-10-CM

## 2023-07-05 DIAGNOSIS — Z95.2 H/O MECHANICAL AORTIC VALVE REPLACEMENT: Primary | ICD-10-CM

## 2023-07-05 LAB
CTP QC/QA: YES
INR PPP: 2.9 (ref 2–3)
PROTHROMBIN TIME, POC: 34.5 (ref 2–3)

## 2023-07-05 PROCEDURE — 85610 PROTHROMBIN TIME: CPT

## 2023-07-07 ENCOUNTER — LAB VISIT (OUTPATIENT)
Dept: LAB | Facility: HOSPITAL | Age: 68
End: 2023-07-07
Attending: INTERNAL MEDICINE
Payer: MEDICARE

## 2023-07-07 DIAGNOSIS — E11.9 TYPE 2 DIABETES MELLITUS WITHOUT COMPLICATION, WITHOUT LONG-TERM CURRENT USE OF INSULIN: ICD-10-CM

## 2023-07-07 DIAGNOSIS — N18.32 STAGE 3B CHRONIC KIDNEY DISEASE: ICD-10-CM

## 2023-07-07 LAB
ALBUMIN SERPL-MCNC: 3.9 G/DL (ref 3.4–4.8)
ALBUMIN/GLOB SERPL: 1.2 RATIO (ref 1.1–2)
ALP SERPL-CCNC: 95 UNIT/L (ref 40–150)
ALT SERPL-CCNC: 39 UNIT/L (ref 0–55)
APPEARANCE UR: CLEAR
AST SERPL-CCNC: 38 UNIT/L (ref 5–34)
BACTERIA #/AREA URNS AUTO: NORMAL /HPF
BASOPHILS # BLD AUTO: 0.14 X10(3)/MCL
BASOPHILS NFR BLD AUTO: 1.3 %
BILIRUB UR QL STRIP.AUTO: NEGATIVE MG/DL
BILIRUBIN DIRECT+TOT PNL SERPL-MCNC: 0.7 MG/DL
BUN SERPL-MCNC: 27.1 MG/DL (ref 8.4–25.7)
CALCIUM SERPL-MCNC: 9.3 MG/DL (ref 8.8–10)
CHLORIDE SERPL-SCNC: 99 MMOL/L (ref 98–107)
CHOLEST SERPL-MCNC: 115 MG/DL
CHOLEST/HDLC SERPL: 3 {RATIO} (ref 0–5)
CO2 SERPL-SCNC: 30 MMOL/L (ref 23–31)
COLOR UR: YELLOW
CREAT SERPL-MCNC: 2 MG/DL (ref 0.73–1.18)
CREAT UR-MCNC: 101.6 MG/DL (ref 63–166)
DEPRECATED CALCIDIOL+CALCIFEROL SERPL-MC: 99.2 NG/ML (ref 30–80)
EOSINOPHIL # BLD AUTO: 0.35 X10(3)/MCL (ref 0–0.9)
EOSINOPHIL NFR BLD AUTO: 3.1 %
ERYTHROCYTE [DISTWIDTH] IN BLOOD BY AUTOMATED COUNT: 19.5 % (ref 11.5–17)
EST. AVERAGE GLUCOSE BLD GHB EST-MCNC: 139.9 MG/DL
GFR SERPLBLD CREATININE-BSD FMLA CKD-EPI: 35 MLS/MIN/1.73/M2
GLOBULIN SER-MCNC: 3.3 GM/DL (ref 2.4–3.5)
GLUCOSE SERPL-MCNC: 112 MG/DL (ref 82–115)
GLUCOSE UR QL STRIP.AUTO: NEGATIVE MG/DL
HBA1C MFR BLD: 6.5 %
HCT VFR BLD AUTO: 40.8 % (ref 42–52)
HDLC SERPL-MCNC: 45 MG/DL (ref 35–60)
HGB BLD-MCNC: 12.4 G/DL (ref 14–18)
IMM GRANULOCYTES # BLD AUTO: 0.03 X10(3)/MCL (ref 0–0.04)
IMM GRANULOCYTES NFR BLD AUTO: 0.3 %
KETONES UR QL STRIP.AUTO: NEGATIVE MG/DL
LDLC SERPL CALC-MCNC: 45 MG/DL (ref 50–140)
LEUKOCYTE ESTERASE UR QL STRIP.AUTO: NEGATIVE UNIT/L
LYMPHOCYTES # BLD AUTO: 1.09 X10(3)/MCL (ref 0.6–4.6)
LYMPHOCYTES NFR BLD AUTO: 9.8 %
MCH RBC QN AUTO: 26.7 PG (ref 27–31)
MCHC RBC AUTO-ENTMCNC: 30.4 G/DL (ref 33–36)
MCV RBC AUTO: 87.9 FL (ref 80–94)
MICROALBUMIN UR-MCNC: 112.3 UG/ML
MICROALBUMIN/CREAT RATIO PNL UR: 110.5 MG/GM CR (ref 0–30)
MONOCYTES # BLD AUTO: 1.98 X10(3)/MCL (ref 0.1–1.3)
MONOCYTES NFR BLD AUTO: 17.8 %
NEUTROPHILS # BLD AUTO: 7.55 X10(3)/MCL (ref 2.1–9.2)
NEUTROPHILS NFR BLD AUTO: 67.7 %
NITRITE UR QL STRIP.AUTO: NEGATIVE
NRBC BLD AUTO-RTO: 0 %
PH UR STRIP.AUTO: 5.5 [PH]
PLATELET # BLD AUTO: 430 X10(3)/MCL (ref 130–400)
PMV BLD AUTO: 10.5 FL (ref 7.4–10.4)
POTASSIUM SERPL-SCNC: 3.9 MMOL/L (ref 3.5–5.1)
PROT SERPL-MCNC: 7.2 GM/DL (ref 5.8–7.6)
PROT UR QL STRIP.AUTO: ABNORMAL MG/DL
RBC # BLD AUTO: 4.64 X10(6)/MCL (ref 4.7–6.1)
RBC #/AREA URNS AUTO: <5 /HPF
RBC UR QL AUTO: ABNORMAL UNIT/L
SODIUM SERPL-SCNC: 141 MMOL/L (ref 136–145)
SP GR UR STRIP.AUTO: 1.01 (ref 1–1.03)
SQUAMOUS #/AREA URNS AUTO: <5 /HPF
TRIGL SERPL-MCNC: 125 MG/DL (ref 34–140)
UROBILINOGEN UR STRIP-ACNC: 0.2 MG/DL
VLDLC SERPL CALC-MCNC: 25 MG/DL
WBC # SPEC AUTO: 11.14 X10(3)/MCL (ref 4.5–11.5)
WBC #/AREA URNS AUTO: <5 /HPF

## 2023-07-07 PROCEDURE — 80061 LIPID PANEL: CPT

## 2023-07-07 PROCEDURE — 80053 COMPREHEN METABOLIC PANEL: CPT

## 2023-07-07 PROCEDURE — 85025 COMPLETE CBC W/AUTO DIFF WBC: CPT

## 2023-07-07 PROCEDURE — 82043 UR ALBUMIN QUANTITATIVE: CPT

## 2023-07-07 PROCEDURE — 82306 VITAMIN D 25 HYDROXY: CPT

## 2023-07-07 PROCEDURE — 36415 COLL VENOUS BLD VENIPUNCTURE: CPT

## 2023-07-07 PROCEDURE — 81001 URINALYSIS AUTO W/SCOPE: CPT

## 2023-07-07 PROCEDURE — 83036 HEMOGLOBIN GLYCOSYLATED A1C: CPT

## 2023-07-10 ENCOUNTER — OFFICE VISIT (OUTPATIENT)
Dept: INTERNAL MEDICINE | Facility: CLINIC | Age: 68
End: 2023-07-10
Payer: MEDICARE

## 2023-07-10 VITALS
DIASTOLIC BLOOD PRESSURE: 62 MMHG | OXYGEN SATURATION: 96 % | WEIGHT: 190.38 LBS | HEIGHT: 72 IN | BODY MASS INDEX: 25.78 KG/M2 | HEART RATE: 70 BPM | SYSTOLIC BLOOD PRESSURE: 112 MMHG

## 2023-07-10 DIAGNOSIS — Z00.00 WELLNESS EXAMINATION: Primary | ICD-10-CM

## 2023-07-10 DIAGNOSIS — D64.9 ANEMIA, UNSPECIFIED TYPE: ICD-10-CM

## 2023-07-10 DIAGNOSIS — E03.9 HYPOTHYROIDISM, UNSPECIFIED TYPE: Chronic | ICD-10-CM

## 2023-07-10 DIAGNOSIS — D63.1 ANEMIA DUE TO STAGE 3B CHRONIC KIDNEY DISEASE: ICD-10-CM

## 2023-07-10 DIAGNOSIS — E78.5 HYPERLIPIDEMIA, UNSPECIFIED HYPERLIPIDEMIA TYPE: Chronic | ICD-10-CM

## 2023-07-10 DIAGNOSIS — N18.32 ANEMIA DUE TO STAGE 3B CHRONIC KIDNEY DISEASE: ICD-10-CM

## 2023-07-10 DIAGNOSIS — E11.9 TYPE 2 DIABETES MELLITUS WITHOUT COMPLICATION, WITHOUT LONG-TERM CURRENT USE OF INSULIN: Chronic | ICD-10-CM

## 2023-07-10 DIAGNOSIS — N18.32 STAGE 3B CHRONIC KIDNEY DISEASE: Chronic | ICD-10-CM

## 2023-07-10 PROBLEM — N18.9 ANEMIA DUE TO CHRONIC KIDNEY DISEASE: Status: ACTIVE | Noted: 2023-07-10

## 2023-07-10 PROBLEM — N18.9 ANEMIA DUE TO CHRONIC KIDNEY DISEASE: Chronic | Status: ACTIVE | Noted: 2023-07-10

## 2023-07-10 PROCEDURE — G0439 PPPS, SUBSEQ VISIT: HCPCS | Mod: ,,, | Performed by: INTERNAL MEDICINE

## 2023-07-10 PROCEDURE — G0439 PR MEDICARE ANNUAL WELLNESS SUBSEQUENT VISIT: ICD-10-PCS | Mod: ,,, | Performed by: INTERNAL MEDICINE

## 2023-07-10 RX ORDER — HYDROCORTISONE ACETATE SUPPOSITORY 30 MG/1
SUPPOSITORY RECTAL
COMMUNITY
Start: 2023-02-09 | End: 2024-02-01

## 2023-07-10 NOTE — PROGRESS NOTES
Cory Watts MD        PATIENT NAME: Bertin Alonzo  : 1955  DATE: 7/10/23  MRN: [id number]      Patient Care Team:  Cory Watts MD as PCP - General (Internal Medicine)  MD Fahad Sneed MD as Consulting Physician (Urology)  Enio Hutson MD as Consulting Physician (Cardiovascular Disease)  Luis Miguel Middleton MD as Consulting Physician (Gastroenterology)       Billing Provider: Cory Watts MD  Level of Service: [place] MEDICARE ANNUAL WELLNESS SUBSEQUENT VISIT  Patient PCP Information       Provider PCP Type    Cory Watts MD General            Reason for Visit / Chief Complaint: No chief complaint on file.       Update PCP  Update Chief Complaint         History of Present Illness / Problem Focused Workflow     Bertin Alonzo presents to the clinic with No chief complaint on file.     Bertin is here for his annual Medicare wellness exam   Diabetes is doing well   His main issues in his low blood pressure and he in his cardiologist's working with his Lasix dosage      Review of Systems   Review of Systems   Constitutional: Negative.    HENT: Negative.     Eyes: Negative.    Respiratory: Negative.     Cardiovascular: Negative.    Gastrointestinal: Negative.    Endocrine: Negative.    Genitourinary: Negative.    Musculoskeletal: Negative.    Integumentary:  Negative.   Neurological: Negative.    Psychiatric/Behavioral: Negative.        Patient Reported Health Risk Assessment       Medical / Social / Family History     Past Medical History:   Diagnosis Date    CAD (coronary artery disease)     CHF (congestive heart failure), NYHA class I, chronic, diastolic     Hypothyroidism, unspecified     Paroxysmal atrial fibrillation     S/P aortic valve replacement and aortoplasty     S/P CABG (coronary artery bypass graft)     Type 2 diabetes mellitus without complication, without long-term current use of insulin        Past Surgical History:   Procedure  Laterality Date    AORTIC VALUE      AORTIC VALVE REPLACEMENT      w aortoplasty    APPENDECTOMY      BYPASS OF MESENTERIC ARTERY      CARDIAC VALVE REPLACEMENT  February 6, 2006    CATARACT EXTRACTION, BILATERAL      CORONARY ARTERY BYPASS GRAFT  2006, 2021    EYE SURGERY      Cataracts    PLEURODESIS USING TALC      SPLENECTOMY, TOTAL      STENTS      CORONARY    THYROID SURGERY         Social History  Mr. Alonzo  reports that he has quit smoking. His smoking use included cigarettes and cigars. He has never used smokeless tobacco. He reports that he does not currently use alcohol after a past usage of about 1.0 standard drink per week. He reports that he does not use drugs.    Family History  Mr.'s Alonzo  family history includes Cancer in his father; Hodgkin's lymphoma in his mother.        Medications and Allergies     Medications  Outpatient Medications Marked as Taking for the 7/10/23 encounter (Office Visit) with Cory Villanueva MD   Medication Sig Dispense Refill    amiodarone (PACERONE) 200 MG Tab Take 200 mg by mouth once daily.      ascorbic acid, vitamin C, (VITAMIN C) 500 MG tablet Take 500 mg by mouth once daily.      cholecalciferol, vitamin D3, (VITAMIN D3) 50 mcg (2,000 unit) Cap capsule Take 5,000 Units by mouth once daily at 6am.      clopidogreL (PLAVIX) 75 mg tablet Take 75 mg by mouth once daily.      furosemide (LASIX) 40 MG tablet Take 1 tablet (40 mg total) by mouth 2 (two) times daily. 180 tablet 3    hydrocortisone acetate 30 mg Supp UNWRAP AND INSERT 1 SUPPOSITORY RECTALLY TWICE DAILY FOR 10 DAYS      levothyroxine (SYNTHROID) 112 MCG tablet Take 1 tablet (112 mcg total) by mouth before breakfast. 30 tablet 11    metFORMIN (GLUCOPHAGE) 500 MG tablet Take 1 tablet (500 mg total) by mouth once daily. (Patient taking differently: Take 500 mg by mouth every morning. 1000mg in Am and 500mg nightly) 30 tablet 12    metOLazone (ZAROXOLYN) 5 MG tablet Take 5 mg by mouth daily as needed.       metoprolol tartrate (LOPRESSOR) 50 MG tablet Take 1 tablet (50 mg total) by mouth 2 (two) times daily. 60 tablet 11    multivit-min/ferrous fumarate (MULTI VITAMIN ORAL) Take 1 tablet by mouth Daily.      nitroGLYCERIN (NITROSTAT) 0.4 MG SL tablet Place 0.4 mg under the tongue daily as needed.      potassium chloride SA (K-DUR,KLOR-CON M) 10 MEQ tablet Take 1 tablet (10 mEq total) by mouth 2 (two) times daily. 60 tablet 11    rosuvastatin (CRESTOR) 40 MG Tab Take 40 mg by mouth nightly.      warfarin (COUMADIN) 2.5 MG tablet Take 1 tablet (2.5 mg total) by mouth every Tuesday, Thursday, Saturday, Sunday. 5mg on Mon, Wed., Fri 135 tablet 3       Allergies  Review of patient's allergies indicates:   Allergen Reactions    Digoxin      Other reaction(s): Toxicity  Other reaction(s): Toxicity  Other reaction(s): Toxicity       Physical Examination     Vitals:    07/10/23 1035   BP: 112/62   Pulse: 70     Physical Exam  Vitals reviewed.   Constitutional:       Appearance: Normal appearance.   HENT:      Head: Normocephalic.      Right Ear: Tympanic membrane normal.      Left Ear: Tympanic membrane normal.      Nose: Nose normal.      Mouth/Throat:      Pharynx: Oropharynx is clear.   Eyes:      Extraocular Movements: Extraocular movements intact.      Pupils: Pupils are equal, round, and reactive to light.   Cardiovascular:      Rate and Rhythm: Normal rate and regular rhythm.      Pulses: Normal pulses.      Heart sounds: Normal heart sounds.   Pulmonary:      Effort: Pulmonary effort is normal.      Breath sounds: Normal breath sounds.   Abdominal:      General: Abdomen is flat. Bowel sounds are normal.      Palpations: Abdomen is soft.   Genitourinary:     Comments: Prostate exam by his urologist Dr. Kaminski  Musculoskeletal:         General: Normal range of motion.      Cervical back: Normal range of motion.   Skin:     General: Skin is warm and dry.   Neurological:      General: No focal deficit present.       Mental Status: He is alert and oriented to person, place, and time.   Psychiatric:         Mood and Affect: Mood normal.         Behavior: Behavior normal.        No flowsheet data found.  Fall Risk Assessment - Outpatient 7/10/2023 10/19/2022 7/12/2022   Mobility Status Ambulatory Ambulatory Ambulatory   Number of falls 1 1 1   Identified as fall risk 0 0 0                Assessment and Plan (including Health Maintenance)      Problem List  Smart Sets  Document Outside HM   :    Plan:   Wellness examination    Type 2 diabetes mellitus without complication, without long-term current use of insulin    Hypothyroidism, unspecified type    Hyperlipidemia, unspecified hyperlipidemia type    Anemia, unspecified type     Hydrated lab ordered to follow up his kidney function and anemia  He will visit next Monday for follow-up.           Health Maintenance Due   Topic Date Due    Hepatitis C Screening  Never done    Foot Exam  Never done    TETANUS VACCINE  Never done    Abdominal Aortic Aneurysm Screening  Never done    Pneumococcal Vaccines (Age 65+) (3 - PPSV23 if available, else PCV20) 12/04/2020    Shingles Vaccine (2 of 2) 06/06/2022    COVID-19 Vaccine (6 - Moderna series) 01/17/2023       Problem List Items Addressed This Visit          Cardiac/Vascular    Hyperlipidemia (Chronic)       Endocrine    Hypothyroidism, unspecified (Chronic)    Type 2 diabetes mellitus without complication, without long-term current use of insulin (Chronic)     Other Visit Diagnoses       Wellness examination    -  Primary    Anemia, unspecified type                Health Maintenance Topics with due status: Not Due       Topic Last Completion Date    Colorectal Cancer Screening 03/20/2014    Eye Exam 08/30/2022    Influenza Vaccine 10/19/2022    High Dose Statin 04/12/2023    Diabetes Urine Screening 07/07/2023    Lipid Panel 07/07/2023    Hemoglobin A1c 07/07/2023       Future Appointments   Date Time Provider Department Center    7/26/2023 11:40 AM COUMADIN, [facility]   4/22/2024  1:40 PM [facility]          Medicare Annual Wellness and Personalized Prevention Plan:   Fall Risk + Home Safety + Hearing Impairment + Depression Screen + Cognitive Impairment Screen + Health Risk Assessment all reviewed.         Advance Care Planning   I attest to discussing Advance Care Planning with patient and/or family member.  Education was provided including the importance of the Health Care Power of , Advance Directives, and/or [place] documentation.  The patient expressed understanding to the importance of this information and discussion.       Opioid Screening: Patient medication list reviewed, patient is not taking prescription opioids. Patient is not using additional opioids than prescribed. Patient is at low risk of substance abuse based on this opioid use history.        Signature:  Cory Watts MD  [facility]  [address] [facility] [id number]    Date of encounter: 7/10/23    Follow up in about 1 week (around 7/17/2023) for Follow Up. In addition to their scheduled follow up, the patient has also been instructed to follow up on as needed basis.

## 2023-07-12 ENCOUNTER — TELEPHONE (OUTPATIENT)
Dept: INTERNAL MEDICINE | Facility: CLINIC | Age: 68
End: 2023-07-12
Payer: MEDICARE

## 2023-07-17 ENCOUNTER — LAB VISIT (OUTPATIENT)
Dept: LAB | Facility: HOSPITAL | Age: 68
End: 2023-07-17
Attending: INTERNAL MEDICINE
Payer: MEDICARE

## 2023-07-17 DIAGNOSIS — D64.9 ANEMIA, UNSPECIFIED TYPE: ICD-10-CM

## 2023-07-17 DIAGNOSIS — E03.9 HYPOTHYROIDISM, UNSPECIFIED TYPE: Chronic | ICD-10-CM

## 2023-07-17 LAB
ANION GAP SERPL CALC-SCNC: 12 MEQ/L
BUN SERPL-MCNC: 28.6 MG/DL (ref 8.4–25.7)
CALCIUM SERPL-MCNC: 8.8 MG/DL (ref 8.8–10)
CHLORIDE SERPL-SCNC: 101 MMOL/L (ref 98–107)
CO2 SERPL-SCNC: 28 MMOL/L (ref 23–31)
CREAT SERPL-MCNC: 2.01 MG/DL (ref 0.73–1.18)
CREAT/UREA NIT SERPL: 14
FERRITIN SERPL-MCNC: 63.54 NG/ML (ref 21.81–274.66)
FOLATE SERPL-MCNC: 11.6 NG/ML (ref 7–31.4)
GFR SERPLBLD CREATININE-BSD FMLA CKD-EPI: 35 MLS/MIN/1.73/M2
GLUCOSE SERPL-MCNC: 119 MG/DL (ref 82–115)
IRON SATN MFR SERPL: 9 % (ref 20–50)
IRON SERPL-MCNC: 37 UG/DL (ref 65–175)
POTASSIUM SERPL-SCNC: 4.1 MMOL/L (ref 3.5–5.1)
RET# (OHS): 0.07 (ref 0.03–0.1)
RETICULOCYTE COUNT AUTOMATED (OLG): 1.55 % (ref 1.1–2.1)
SODIUM SERPL-SCNC: 141 MMOL/L (ref 136–145)
TIBC SERPL-MCNC: 359 UG/DL (ref 69–240)
TIBC SERPL-MCNC: 396 UG/DL (ref 250–450)
TRANSFERRIN SERPL-MCNC: 375 MG/DL (ref 163–344)
TSH SERPL-ACNC: 1.38 UIU/ML (ref 0.35–4.94)
VIT B12 SERPL-MCNC: 393 PG/ML (ref 213–816)

## 2023-07-17 PROCEDURE — 82728 ASSAY OF FERRITIN: CPT

## 2023-07-17 PROCEDURE — 80048 BASIC METABOLIC PNL TOTAL CA: CPT

## 2023-07-17 PROCEDURE — 84443 ASSAY THYROID STIM HORMONE: CPT

## 2023-07-17 PROCEDURE — 83550 IRON BINDING TEST: CPT

## 2023-07-17 PROCEDURE — 85045 AUTOMATED RETICULOCYTE COUNT: CPT

## 2023-07-17 PROCEDURE — 82607 VITAMIN B-12: CPT

## 2023-07-17 PROCEDURE — 82746 ASSAY OF FOLIC ACID SERUM: CPT

## 2023-07-17 PROCEDURE — 36415 COLL VENOUS BLD VENIPUNCTURE: CPT

## 2023-07-18 ENCOUNTER — OFFICE VISIT (OUTPATIENT)
Dept: INTERNAL MEDICINE | Facility: CLINIC | Age: 68
End: 2023-07-18
Payer: MEDICARE

## 2023-07-18 VITALS
SYSTOLIC BLOOD PRESSURE: 110 MMHG | HEIGHT: 72 IN | BODY MASS INDEX: 25.76 KG/M2 | WEIGHT: 190.19 LBS | OXYGEN SATURATION: 99 % | DIASTOLIC BLOOD PRESSURE: 60 MMHG | HEART RATE: 68 BPM

## 2023-07-18 DIAGNOSIS — N18.32 STAGE 3B CHRONIC KIDNEY DISEASE: Chronic | ICD-10-CM

## 2023-07-18 DIAGNOSIS — N18.32 ANEMIA DUE TO STAGE 3B CHRONIC KIDNEY DISEASE: Primary | ICD-10-CM

## 2023-07-18 DIAGNOSIS — E11.9 TYPE 2 DIABETES MELLITUS WITHOUT COMPLICATION, WITHOUT LONG-TERM CURRENT USE OF INSULIN: Chronic | ICD-10-CM

## 2023-07-18 DIAGNOSIS — D63.1 ANEMIA DUE TO STAGE 3B CHRONIC KIDNEY DISEASE: Primary | ICD-10-CM

## 2023-07-18 PROCEDURE — 99213 OFFICE O/P EST LOW 20 MIN: CPT | Mod: ,,, | Performed by: INTERNAL MEDICINE

## 2023-07-18 PROCEDURE — 99213 PR OFFICE/OUTPT VISIT, EST, LEVL III, 20-29 MIN: ICD-10-PCS | Mod: ,,, | Performed by: INTERNAL MEDICINE

## 2023-07-18 NOTE — PROGRESS NOTES
Cory Watts MD        PATIENT NAME: Bertin Alonzo  : 1955  DATE: 23  MRN: [id number]      Billing Provider: Cory Watts MD  Level of Service: [place] OFFICE/OUTPT VISIT, EST, LEVL III, 20-29 MIN  Patient PCP Information       Provider PCP Type    Cory Watts MD General            Reason for Visit / Chief Complaint: Follow-up (1 wk f/u/)       Update PCP  Update Chief Complaint         History of Present Illness / Problem Focused Workflow     Bertin Alonzo presents to the clinic with Follow-up (1 wk f/u/)     Bertin is here to follow-up on his laboratory workup increasing anemia and slowing of his renal function   He is feeling well  He has no blood in his stool or melena   He is had a colonoscopy years ago with Dr. Sathish Vegas      Review of Systems   Review of Systems   Constitutional: Negative.    HENT: Negative.     Eyes: Negative.    Respiratory: Negative.     Cardiovascular: Negative.    Gastrointestinal: Negative.    Endocrine: Negative.    Genitourinary: Negative.    Musculoskeletal: Negative.    Integumentary:  Negative.   Neurological: Negative.    Psychiatric/Behavioral: Negative.        Medical / Social / Family History     Past Medical History:   Diagnosis Date    CAD (coronary artery disease)     CHF (congestive heart failure), NYHA class I, chronic, diastolic     Hypothyroidism, unspecified     Paroxysmal atrial fibrillation     S/P aortic valve replacement and aortoplasty     S/P CABG (coronary artery bypass graft)     Type 2 diabetes mellitus without complication, without long-term current use of insulin        Past Surgical History:   Procedure Laterality Date    AORTIC VALUE      AORTIC VALVE REPLACEMENT      w aortoplasty    APPENDECTOMY      BYPASS OF MESENTERIC ARTERY      CARDIAC VALVE REPLACEMENT  2006    CATARACT EXTRACTION, BILATERAL      CORONARY ARTERY BYPASS GRAFT  2021    EYE SURGERY      Cataracts    PLEURODESIS USING  TALC      SPLENECTOMY, TOTAL      STENTS      CORONARY    THYROID SURGERY         Social History  Mr. Alonzo  reports that he has quit smoking. His smoking use included cigarettes and cigars. He has never used smokeless tobacco. He reports that he does not currently use alcohol after a past usage of about 1.0 standard drink per week. He reports that he does not use drugs.    Family History  Mr.'s Alonzo  family history includes Cancer in his father; Hodgkin's lymphoma in his mother.    Medications and Allergies     Medications  Outpatient Medications Marked as Taking for the 7/18/23 encounter (Office Visit) with Cory Villanueva MD   Medication Sig Dispense Refill    amiodarone (PACERONE) 200 MG Tab Take 200 mg by mouth once daily.      ascorbic acid, vitamin C, (VITAMIN C) 500 MG tablet Take 500 mg by mouth once daily.      cholecalciferol, vitamin D3, (VITAMIN D3) 50 mcg (2,000 unit) Cap capsule Take 5,000 Units by mouth once daily at 6am.      clopidogreL (PLAVIX) 75 mg tablet Take 75 mg by mouth once daily.      furosemide (LASIX) 40 MG tablet Take 1 tablet (40 mg total) by mouth 2 (two) times daily. 180 tablet 3    hydrocortisone acetate 30 mg Supp UNWRAP AND INSERT 1 SUPPOSITORY RECTALLY TWICE DAILY FOR 10 DAYS      levothyroxine (SYNTHROID) 112 MCG tablet Take 1 tablet (112 mcg total) by mouth before breakfast. 30 tablet 11    metFORMIN (GLUCOPHAGE) 500 MG tablet Take 1 tablet (500 mg total) by mouth once daily. (Patient taking differently: Take 500 mg by mouth every morning. 1000mg in Am and 500mg nightly) 30 tablet 12    metOLazone (ZAROXOLYN) 5 MG tablet Take 5 mg by mouth daily as needed.      metoprolol tartrate (LOPRESSOR) 50 MG tablet Take 1 tablet (50 mg total) by mouth 2 (two) times daily. 60 tablet 11    multivit-min/ferrous fumarate (MULTI VITAMIN ORAL) Take 1 tablet by mouth Daily.      nitroGLYCERIN (NITROSTAT) 0.4 MG SL tablet Place 0.4 mg under the tongue daily as needed.       potassium chloride SA (K-DUR,KLOR-CON M) 10 MEQ tablet Take 1 tablet (10 mEq total) by mouth 2 (two) times daily. 60 tablet 11    rosuvastatin (CRESTOR) 40 MG Tab Take 40 mg by mouth nightly.      warfarin (COUMADIN) 2.5 MG tablet Take 1 tablet (2.5 mg total) by mouth every Tuesday, Thursday, Saturday, Sunday. 5mg on Mon, Wed., Fri 135 tablet 3       Allergies  Review of patient's allergies indicates:   Allergen Reactions    Digoxin      Other reaction(s): Toxicity  Other reaction(s): Toxicity  Other reaction(s): Toxicity       Physical Examination     Vitals:    07/18/23 0905   BP: 110/60   Pulse: 68     Physical Exam  Vitals reviewed.   Constitutional:       Appearance: Normal appearance.   HENT:      Head: Normocephalic.      Right Ear: Tympanic membrane normal.      Left Ear: Tympanic membrane normal.      Nose: Nose normal.      Mouth/Throat:      Pharynx: Oropharynx is clear.   Eyes:      Extraocular Movements: Extraocular movements intact.      Pupils: Pupils are equal, round, and reactive to light.   Cardiovascular:      Rate and Rhythm: Normal rate and regular rhythm.      Pulses: Normal pulses.      Heart sounds: Normal heart sounds.   Pulmonary:      Effort: Pulmonary effort is normal.      Breath sounds: Normal breath sounds.   Abdominal:      General: Abdomen is flat. Bowel sounds are normal.      Palpations: Abdomen is soft.   Musculoskeletal:         General: Normal range of motion.      Cervical back: Normal range of motion.   Skin:     General: Skin is warm and dry.   Neurological:      General: No focal deficit present.      Mental Status: He is alert and oriented to person, place, and time.   Psychiatric:         Mood and Affect: Mood normal.         Behavior: Behavior normal.        Assessment and Plan (including Health Maintenance)      Problem List  Smart Sets  Document Outside HM   :    Plan:   Anemia due to stage 3b chronic kidney disease    Stage 3b chronic kidney disease       Stool for  blood x3 ordered   Slow fee daily   Referral to Dr. Karson Bridges   Referral to Dr. Sathish Vegas for colonoscopy   Revisit six-month follow-up laboratory            Health Maintenance Due   Topic Date Due    Hepatitis C Screening  Never done    Foot Exam  Never done    TETANUS VACCINE  Never done    Abdominal Aortic Aneurysm Screening  Never done    Pneumococcal Vaccines (Age 65+) (3 - PPSV23 if available, else PCV20) 12/04/2020    Shingles Vaccine (2 of 2) 06/06/2022    COVID-19 Vaccine (6 - Moderna series) 01/17/2023       Problem List Items Addressed This Visit          Renal/    Stage 3b chronic kidney disease (Chronic)       Oncology    Anemia due to chronic kidney disease - Primary (Chronic)       Health Maintenance Topics with due status: Not Due       Topic Last Completion Date    Colorectal Cancer Screening 03/20/2014    Eye Exam 08/30/2022    Influenza Vaccine 10/19/2022    Diabetes Urine Screening 07/07/2023    Lipid Panel 07/07/2023    Hemoglobin A1c 07/07/2023    High Dose Statin 07/10/2023       Future Appointments   Date Time Provider Department Center   7/26/2023 11:40 AM [facility], [facility]   4/22/2024  1:40 PM VINCENT Peraza MD [facility] Sade Peraza            Signature:  Cory Watts MD  [facility]  [address] [facility] [id number]    Date of encounter: 7/18/23

## 2023-07-24 PROCEDURE — 82270 OCCULT BLOOD FECES: CPT | Performed by: INTERNAL MEDICINE

## 2023-07-26 ENCOUNTER — ANTI-COAG VISIT (OUTPATIENT)
Dept: CARDIOLOGY | Facility: HOSPITAL | Age: 68
End: 2023-07-26
Payer: MEDICARE

## 2023-07-26 DIAGNOSIS — Z79.01 LONG TERM (CURRENT) USE OF ANTICOAGULANTS: ICD-10-CM

## 2023-07-26 DIAGNOSIS — Z95.2 H/O MECHANICAL AORTIC VALVE REPLACEMENT: Primary | ICD-10-CM

## 2023-07-26 LAB
CTP QC/QA: YES
INR PPP: 4.2 (ref 2–3)
PROTHROMBIN TIME, POC: 50 (ref 2–3)

## 2023-07-26 PROCEDURE — 85610 PROTHROMBIN TIME: CPT

## 2023-07-31 LAB
HEMOCCULT SP1 STL QL: NEGATIVE
HEMOCCULT SP2 STL QL: NEGATIVE
HEMOCCULT SP3 STL QL: NEGATIVE

## 2023-08-09 ENCOUNTER — ANTI-COAG VISIT (OUTPATIENT)
Dept: CARDIOLOGY | Facility: HOSPITAL | Age: 68
End: 2023-08-09
Payer: MEDICARE

## 2023-08-09 DIAGNOSIS — Z95.2 H/O MECHANICAL AORTIC VALVE REPLACEMENT: Primary | ICD-10-CM

## 2023-08-09 DIAGNOSIS — Z79.01 LONG TERM (CURRENT) USE OF ANTICOAGULANTS: ICD-10-CM

## 2023-08-09 LAB
CTP QC/QA: YES
INR PPP: 2.3 (ref 2–3)
PROTHROMBIN TIME, POC: 27.5 (ref 2–3)

## 2023-08-09 PROCEDURE — 99211 OFF/OP EST MAY X REQ PHY/QHP: CPT

## 2023-08-09 PROCEDURE — 85610 PROTHROMBIN TIME: CPT

## 2023-08-23 ENCOUNTER — ANTI-COAG VISIT (OUTPATIENT)
Dept: CARDIOLOGY | Facility: HOSPITAL | Age: 68
End: 2023-08-23
Payer: MEDICARE

## 2023-08-23 DIAGNOSIS — Z95.2 H/O MECHANICAL AORTIC VALVE REPLACEMENT: Primary | ICD-10-CM

## 2023-08-23 DIAGNOSIS — Z79.01 LONG TERM (CURRENT) USE OF ANTICOAGULANTS: ICD-10-CM

## 2023-08-23 LAB
CTP QC/QA: YES
INR PPP: 3.3 (ref 2–3)
PROTHROMBIN TIME, POC: 40.1 (ref 2–3)

## 2023-08-23 PROCEDURE — 85610 PROTHROMBIN TIME: CPT

## 2023-09-20 ENCOUNTER — ANTI-COAG VISIT (OUTPATIENT)
Dept: CARDIOLOGY | Facility: HOSPITAL | Age: 68
End: 2023-09-20
Payer: MEDICARE

## 2023-09-20 DIAGNOSIS — Z79.01 LONG TERM (CURRENT) USE OF ANTICOAGULANTS: ICD-10-CM

## 2023-09-20 DIAGNOSIS — Z95.2 H/O MECHANICAL AORTIC VALVE REPLACEMENT: Primary | ICD-10-CM

## 2023-09-20 LAB
INR PPP: 3.8
PROTHROMBIN TIME: 36.7 SECONDS (ref 12.5–14.5)

## 2023-09-20 PROCEDURE — 36415 COLL VENOUS BLD VENIPUNCTURE: CPT

## 2023-09-20 PROCEDURE — 85610 PROTHROMBIN TIME: CPT

## 2023-09-20 NOTE — PROGRESS NOTES
POC pt/inr performed.  Noted PT/ INR 63.6/5.3 Lab drawn per protocol.  Pt will hold warfarin today, then take 1.25 mg of warfarin 9/21/23. Spoke with Eleanor at office about upcoming ht cath and results today.

## 2023-09-27 ENCOUNTER — ANTI-COAG VISIT (OUTPATIENT)
Dept: CARDIOLOGY | Facility: HOSPITAL | Age: 68
End: 2023-09-27
Payer: MEDICARE

## 2023-09-27 DIAGNOSIS — Z79.01 LONG TERM (CURRENT) USE OF ANTICOAGULANTS: ICD-10-CM

## 2023-09-27 DIAGNOSIS — Z95.2 H/O MECHANICAL AORTIC VALVE REPLACEMENT: Primary | ICD-10-CM

## 2023-09-27 LAB
CTP QC/QA: YES
INR PPP: 1.9 (ref 2–3)
PROTHROMBIN TIME, POC: 23 (ref 2–3)

## 2023-09-27 PROCEDURE — 85610 PROTHROMBIN TIME: CPT

## 2023-10-02 ENCOUNTER — ANTI-COAG VISIT (OUTPATIENT)
Dept: CARDIOLOGY | Facility: HOSPITAL | Age: 68
End: 2023-10-02
Payer: MEDICARE

## 2023-10-02 DIAGNOSIS — Z79.01 LONG TERM (CURRENT) USE OF ANTICOAGULANTS: ICD-10-CM

## 2023-10-02 DIAGNOSIS — Z95.2 H/O MECHANICAL AORTIC VALVE REPLACEMENT: Primary | ICD-10-CM

## 2023-10-02 LAB
CTP QC/QA: YES
INR PPP: 2.7 (ref 2–3)
PROTHROMBIN TIME, POC: 32.2 (ref 2–3)

## 2023-10-02 PROCEDURE — 85610 PROTHROMBIN TIME: CPT

## 2023-10-02 PROCEDURE — 99211 OFF/OP EST MAY X REQ PHY/QHP: CPT

## 2023-10-05 ENCOUNTER — TELEPHONE (OUTPATIENT)
Dept: INTERNAL MEDICINE | Facility: CLINIC | Age: 68
End: 2023-10-05

## 2023-10-05 NOTE — TELEPHONE ENCOUNTER
----- Message from Adela Rose sent at 10/5/2023 11:44 AM CDT -----  .Type:  Needs Medical Advice    Who Called: pt  Symptoms (please be specific):    How long has patient had these symptoms:    Pharmacy name and phone #:    Would the patient rather a call back or a response via [facility]? cb  Best Call Back Number: [phone]  Additional Information: pt set up portal it's showing he activated but he said he can't get in for appt

## 2023-10-05 NOTE — TELEPHONE ENCOUNTER
I cannot perform a visit via telephone. If he would like to come in at 8:00 tomorrow AM, I can see him. Of if an alternative NP can see him at another time, please schedule.

## 2023-10-05 NOTE — TELEPHONE ENCOUNTER
----- Message from Anirudh Berger sent at 10/5/2023  3:03 PM CDT -----  .Type:  Patient Returning Call    Who Called:pt   Who Left Message for Patient:  Does the patient know what this is regarding?:appt 10/5/23  Would the patient rather a call back or a response via [facility]? Call b [facility]   Best Call Back Number:[phone]  Additional Information: Pt is calling because he could not log on this am an d wants a phone call instead of the vitual if you have time for today..

## 2023-10-06 ENCOUNTER — OFFICE VISIT (OUTPATIENT)
Dept: INTERNAL MEDICINE | Facility: CLINIC | Age: 68
End: 2023-10-06
Payer: MEDICARE

## 2023-10-06 VITALS — SYSTOLIC BLOOD PRESSURE: 120 MMHG | DIASTOLIC BLOOD PRESSURE: 75 MMHG | HEART RATE: 78 BPM

## 2023-10-06 DIAGNOSIS — M54.50 CHRONIC BILATERAL LOW BACK PAIN WITHOUT SCIATICA: ICD-10-CM

## 2023-10-06 DIAGNOSIS — Z09 HOSPITAL DISCHARGE FOLLOW-UP: Primary | ICD-10-CM

## 2023-10-06 DIAGNOSIS — G89.29 CHRONIC BILATERAL LOW BACK PAIN WITHOUT SCIATICA: ICD-10-CM

## 2023-10-06 PROCEDURE — 99214 PR OFFICE/OUTPT VISIT, EST, LEVL IV, 30-39 MIN: ICD-10-PCS | Mod: 95,,, | Performed by: NURSE PRACTITIONER

## 2023-10-06 PROCEDURE — 99214 OFFICE O/P EST MOD 30 MIN: CPT | Mod: 95,,, | Performed by: NURSE PRACTITIONER

## 2023-10-06 RX ORDER — HYDROCODONE BITARTRATE AND ACETAMINOPHEN 7.5; 325 MG/1; MG/1
1 TABLET ORAL EVERY 8 HOURS PRN
Qty: 21 TABLET | Refills: 0 | Status: SHIPPED | OUTPATIENT
Start: 2023-10-06 | End: 2024-02-01

## 2023-10-06 NOTE — PROGRESS NOTES
"  Patient ID: [id number]     Chief Complaint: Follow-up      HPI:     This is a telemedicine note. Patient was treated using telemedicine, real time audio and video, according to [facility] protocols. I, Aleisha CALZADA, conducted the visit from the [facility]. The patient participated in the visit at a non-[facility] location selected by the patient, identified below. I am licensed in the state where the patient stated they are located. The patient stated that they understood and accepted the privacy and security risks to their information at their location. This visit is not recorded.    Patient was located at the patient's home.       Bertin Alonzo is a 68 y.o. male here today for a telemedicine visit. Patient of Dr. Villanueva's has c/o low back pain. He reports recent coding during angiogram 1 week ago. He states issues with mobility last weekend. He had some improvement with ability to mobilize early Monday with inc pain and issues with mobility later that evening. He did go to [facility] ER on 10/3 with extensive eval with CT abd/pelvis, renal US, and XR of spine. Rec for referral to [facility] per Dr. Milan, although he did state he was no longer a candidate for sx due to recent events. He is feeling a bit better with intermittent pain.  Area of pain/discomfort "seems to be shrinking". Pain noted to base of spine rated. He is able to stand a bit, but cannot lock knees. Ambulating with cane. Rates pain 4-5/10.  He is taking Tylenol Extra Strength and Norco. Some point tenderness noted to R of spine. Some radiculopathy to RLE. No CP, palpitations, or SOB. Wife states extensive bruising to angio site and abdomen r/t Lovenox injection. Denies evidence of hematoma or other s/s of bleeding at this time. OAC warfarin.        ----------------------------  CAD (coronary artery disease)  CHF (congestive heart failure), NYHA class I, chronic, diastolic  Hypothyroidism, unspecified  Paroxysmal atrial " fibrillation  S/P aortic valve replacement and aortoplasty  S/P CABG (coronary artery bypass graft)  Type 2 diabetes mellitus without complication, without long-term   current use of insulin     Past Surgical History:   Procedure Laterality Date    AORTIC VALUE      AORTIC VALVE REPLACEMENT      w aortoplasty    APPENDECTOMY      BYPASS OF MESENTERIC ARTERY      CARDIAC VALVE REPLACEMENT  February 6, 2006    CATARACT EXTRACTION, BILATERAL      CORONARY ARTERY BYPASS GRAFT  2006, 2021    EYE SURGERY      Cataracts    PLEURODESIS USING TALC      SPLENECTOMY, TOTAL      STENTS      CORONARY    THYROID SURGERY         Review of patient's allergies indicates:   Allergen Reactions    Digoxin      Other reaction(s): Toxicity  Other reaction(s): Toxicity  Other reaction(s): Toxicity       Outpatient Medications Marked as Taking for the 10/6/23 encounter (Office Visit) with Aleisha Stuart NP   Medication Sig Dispense Refill    amiodarone (PACERONE) 200 MG Tab Take 200 mg by mouth once daily.      cholecalciferol, vitamin D3, (VITAMIN D3) 50 mcg (2,000 unit) Cap capsule Take 5,000 Units by mouth once daily at 6am.      clopidogreL (PLAVIX) 75 mg tablet Take 75 mg by mouth once daily.      ferrous sulfate 140 mg (45 mg iron) TbSR Take by mouth.      furosemide (LASIX) 40 MG tablet Take 1 tablet (40 mg total) by mouth 2 (two) times daily. 180 tablet 3    hydrocortisone acetate 30 mg Supp UNWRAP AND INSERT 1 SUPPOSITORY RECTALLY TWICE DAILY FOR 10 DAYS      levothyroxine (SYNTHROID) 112 MCG tablet Take 1 tablet (112 mcg total) by mouth before breakfast. 30 tablet 11    metFORMIN (GLUCOPHAGE) 500 MG tablet Take 1 tablet (500 mg total) by mouth once daily. (Patient taking differently: Take 500 mg by mouth every morning. 1000mg in Am and 500mg nightly) 30 tablet 12    metoprolol tartrate (LOPRESSOR) 50 MG tablet Take 1 tablet (50 mg total) by mouth 2 (two) times daily. 60 tablet 11    potassium chloride SA (K-DUR,KLOR-CON M) 10  MEQ tablet Take 1 tablet (10 mEq total) by mouth 2 (two) times daily. 60 tablet 11    rosuvastatin (CRESTOR) 40 MG Tab Take 40 mg by mouth nightly.      warfarin (COUMADIN) 2.5 MG tablet Take 1 tablet (2.5 mg total) by mouth every Tuesday, Thursday, Saturday, Sunday. 5mg on Mon, Wed., Fri 135 tablet 3       Social History     Socioeconomic History    Marital status:    Tobacco Use    Smoking status: Former     Types: Cigarettes, Cigars    Smokeless tobacco: Never    Tobacco comments:     Pt quit smoking cigars about 20 years ago.   Substance and Sexual Activity    Alcohol use: Not Currently     Alcohol/week: 1.0 standard drink of alcohol     Types: 1 Shots of liquor per week    Drug use: Never     Social Determinants of Health     Financial Resource Strain: Low Risk  (7/18/2023)    Overall Financial Resource Strain (CARDIA)     Difficulty of Paying Living Expenses: Not hard at all   Food Insecurity: No Food Insecurity (7/18/2023)    Hunger Vital Sign     Worried About Running Out of Food in the Last Year: Never true     Ran Out of Food in the Last Year: Never true   Transportation Needs: No Transportation Needs (7/18/2023)    PRAPARE - Transportation     Lack of Transportation (Medical): No     Lack of Transportation (Non-Medical): No   Physical Activity: Sufficiently Active (7/18/2023)    Exercise Vital Sign     Days of Exercise per Week: 7 days     Minutes of Exercise per Session: 60 min   Stress: No Stress Concern Present (7/18/2023)    [employer] [place] [employer] - Occupational Stress Questionnaire     Feeling of Stress : Not at all   Social Connections: Socially Integrated (7/18/2023)    Social Connection and Isolation Panel [NHANES]     Frequency of Communication with Friends and Family: More than three times a week     Frequency of Social Gatherings with Friends and Family: More than three times a week     Attends [employer] Services: More than 4 times per year     Active Member of Clubs or  Organizations: Yes     Attends Club or Organization Meetings: More than 4 times per year     Marital Status:    Housing Stability: Unknown (7/18/2023)    Housing Stability Vital Sign     Unable to Pay for Housing in the Last Year: No     Unstable Housing in the Last Year: No        Family History   Problem Relation Age of Onset    Hodgkin's lymphoma Mother     Cancer Father         Patient Care Team:  Cory Villanueva MD as PCP - General (Internal Medicine)  Fahad Kaminski MD as Consulting Physician (Urology)  Eino Hutson MD as Consulting Physician (Cardiovascular Disease)  Luis Miguel Middleton MD as Consulting Physician (Gastroenterology)      Subjective:       ROS    See HPI for details    Answers submitted by the patient for this visit:  Back Pain Questionnaire (Submitted on 10/6/2023)  Chief Complaint: Back pain  Chronicity: new  Onset: in the past 7 days  Frequency: constantly  Progression since onset: gradually improving  Pain location: lumbar spine  Pain quality: stabbing  Radiates to: left knee, left thigh, right foot, right knee, right thigh  Pain - numeric: 4/10  Pain is: the same all the time  Aggravated by: bending, coughing, position, twisting  Stiffness is present: all day  bladder incontinence: No  bowel incontinence: No  leg pain: Yes  numbness: Yes  paresis: No  paresthesias: No  pelvic pain: No  perianal numbness: No  genital pain: No  Risk factors: history of cancer, poor posture, recent trauma  Pain severity: moderate  Improvement on treatment: significant  All Other ROS: Negative except as stated in HPI.       Objective:     /75   Pulse 78     Physical Exam    Physical Exam: LIMITED DUE TO TELEMEDICINE RESTRICTIONS.  General: Alert and oriented, No acute distress.  Head: Normocephalic, Atraumatic.  Eye: Sclera non-icteric.  Neck/Thyroid:  Full range of motion.  Respiratory: Non-labored respirations, Symmetrical chest wall expansion.  Musculoskeletal: Normal range of  motion.  Integumentary: Warm, Dry, Intact, No visible suspicious lesions or rashes. No diaphoresis.   Neurologic: No focal deficits  Psychiatric: Normal interaction, Coherent speech, Euthymic mood, Appropriate affect       Assessment:       ICD-10-CM ICD-9-CM   1. Hospital discharge follow-up  Z09 V67.59   2. Chronic bilateral low back pain without sciatica  M54.50 724.2    G89.29 338.29        Plan:     1. Hospital discharge follow-up  Personally reviewed ER records and work up performed 10/3  Follow up with Dr. Villanueva as needed/as scheduled    2. Chronic bilateral low back pain without sciatica  Refer to HH PT/OT  Refer to [facility]  Continue Tylenol and Norco as needed for pain-advised gradual reduction of Norco and possible s/e of dizziness, sedation, and constipation  Ice/heat  F/u as needed    - Ambulatory referral/consult to Neurosurgery; Future  - HYDROcodone-acetaminophen (NORCO) 7.5-325 mg per tablet; Take 1 tablet by mouth every 8 (eight) hours as needed for Pain.  Dispense: 21 tablet; Refill: 0  - Ambulatory referral/consult to Home Health; Future        Medication List with Changes/Refills   New Medications    HYDROCODONE-ACETAMINOPHEN (NORCO) 7.5-325 MG PER TABLET    Take 1 tablet by mouth every 8 (eight) hours as needed for Pain.       Start Date: 10/6/2023 End Date: --   Current Medications    AMIODARONE (PACERONE) 200 MG TAB    Take 200 mg by mouth once daily.       Start Date: 4/12/2022 End Date: --    ASCORBIC ACID, VITAMIN C, (VITAMIN C) 500 MG TABLET    Take 500 mg by mouth once daily.       Start Date: --        End Date: --    CHOLECALCIFEROL, VITAMIN D3, (VITAMIN D3) 50 MCG (2,000 UNIT) CAP CAPSULE    Take 5,000 Units by mouth once daily at 6am.       Start Date: 11/25/2021End Date: --    CLOPIDOGREL (PLAVIX) 75 MG TABLET    Take 75 mg by mouth once daily.       Start Date: --        End Date: --    FERROUS SULFATE 140 MG (45 MG IRON) TBSR    Take by mouth.       Start Date: --        End Date:  --    FUROSEMIDE (LASIX) 40 MG TABLET    Take 1 tablet (40 mg total) by mouth 2 (two) times daily.       Start Date: 9/23/2022 End Date: --    HYDROCORTISONE ACETATE 30 MG SUPP    UNWRAP AND INSERT 1 SUPPOSITORY RECTALLY TWICE DAILY FOR 10 DAYS       Start Date: 2/9/2023  End Date: --    LEVOTHYROXINE (SYNTHROID) 112 MCG TABLET    Take 1 tablet (112 mcg total) by mouth before breakfast.       Start Date: 1/26/2023 End Date: 1/26/2024    METFORMIN (GLUCOPHAGE) 500 MG TABLET    Take 1 tablet (500 mg total) by mouth once daily.       Start Date: 10/12/2022End Date: --    METOLAZONE (ZAROXOLYN) 5 MG TABLET    Take 5 mg by mouth daily as needed.       Start Date: 4/12/2022 End Date: --    METOPROLOL TARTRATE (LOPRESSOR) 50 MG TABLET    Take 1 tablet (50 mg total) by mouth 2 (two) times daily.       Start Date: 1/26/2023 End Date: 1/26/2024    MULTIVIT-MIN/FERROUS FUMARATE (MULTI VITAMIN ORAL)    Take 1 tablet by mouth Daily.       Start Date: --        End Date: --    NITROGLYCERIN (NITROSTAT) 0.4 MG SL TABLET    Place 0.4 mg under the tongue daily as needed.       Start Date: 11/25/2021End Date: --    POTASSIUM CHLORIDE SA (K-DUR,KLOR-CON M) 10 MEQ TABLET    Take 1 tablet (10 mEq total) by mouth 2 (two) times daily.       Start Date: 1/26/2023 End Date: --    ROSUVASTATIN (CRESTOR) 40 MG TAB    Take 40 mg by mouth nightly.       Start Date: 4/12/2022 End Date: --    WARFARIN (COUMADIN) 2.5 MG TABLET    Take 1 tablet (2.5 mg total) by mouth every Tuesday, Thursday, Saturday, Sunday. 5mg on Mon, Wed., Fri       Start Date: 2/28/2023 End Date: --          No follow-ups on file. In addition to their scheduled follow up, the patient has also been instructed to follow up on as needed basis.       Video Time Documentation:  Spent 10 minutes with patient face to face discussed health concerns. More than 50% of this time was spent in counseling and coordination of care.

## 2023-10-26 ENCOUNTER — APPOINTMENT (OUTPATIENT)
Dept: LAB | Facility: HOSPITAL | Age: 68
End: 2023-10-26
Attending: INTERNAL MEDICINE
Payer: MEDICARE

## 2023-10-26 DIAGNOSIS — N18.6 TYPE 2 DIABETES MELLITUS WITH END-STAGE RENAL DISEASE: ICD-10-CM

## 2023-10-26 DIAGNOSIS — I12.9 PARENCHYMAL RENAL HYPERTENSION: ICD-10-CM

## 2023-10-26 DIAGNOSIS — E11.22 TYPE 2 DIABETES MELLITUS WITH END-STAGE RENAL DISEASE: ICD-10-CM

## 2023-10-26 DIAGNOSIS — D63.1 ANEMIA OF CHRONIC RENAL FAILURE, UNSPECIFIED CKD STAGE: ICD-10-CM

## 2023-10-26 DIAGNOSIS — N18.32 CHRONIC KIDNEY DISEASE (CKD) STAGE G3B/A1, MODERATELY DECREASED GLOMERULAR FILTRATION RATE (GFR) BETWEEN 30-44 ML/MIN/1.73 SQUARE METER AND ALBUMINURIA CREATININE RATIO LESS THAN 30 MG/G: Primary | ICD-10-CM

## 2023-10-26 DIAGNOSIS — N18.9 ANEMIA OF CHRONIC RENAL FAILURE, UNSPECIFIED CKD STAGE: ICD-10-CM

## 2023-10-26 LAB
BASOPHILS # BLD AUTO: 0.11 X10(3)/MCL
BASOPHILS NFR BLD AUTO: 1.3 %
CREAT UR-MCNC: 86.6 MG/DL (ref 63–166)
DEPRECATED CALCIDIOL+CALCIFEROL SERPL-MC: 128.7 NG/ML (ref 30–80)
EOSINOPHIL # BLD AUTO: 0.24 X10(3)/MCL (ref 0–0.9)
EOSINOPHIL NFR BLD AUTO: 2.9 %
ERYTHROCYTE [DISTWIDTH] IN BLOOD BY AUTOMATED COUNT: 19 % (ref 11.5–17)
FERRITIN SERPL-MCNC: 153.02 NG/ML (ref 21.81–274.66)
HCT VFR BLD AUTO: 48.3 % (ref 42–52)
HGB BLD-MCNC: 14.9 G/DL (ref 14–18)
IMM GRANULOCYTES # BLD AUTO: 0.04 X10(3)/MCL (ref 0–0.04)
IMM GRANULOCYTES NFR BLD AUTO: 0.5 %
IRON SATN MFR SERPL: 21 % (ref 20–50)
IRON SERPL-MCNC: 75 UG/DL (ref 65–175)
LYMPHOCYTES # BLD AUTO: 1.16 X10(3)/MCL (ref 0.6–4.6)
LYMPHOCYTES NFR BLD AUTO: 13.9 %
MAGNESIUM SERPL-MCNC: 2.4 MG/DL (ref 1.6–2.6)
MCH RBC QN AUTO: 29.2 PG (ref 27–31)
MCHC RBC AUTO-ENTMCNC: 30.8 G/DL (ref 33–36)
MCV RBC AUTO: 94.7 FL (ref 80–94)
MONOCYTES # BLD AUTO: 1.56 X10(3)/MCL (ref 0.1–1.3)
MONOCYTES NFR BLD AUTO: 18.7 %
NEUTROPHILS # BLD AUTO: 5.25 X10(3)/MCL (ref 2.1–9.2)
NEUTROPHILS NFR BLD AUTO: 62.7 %
NRBC BLD AUTO-RTO: 0 %
PHOSPHATE SERPL-MCNC: 3.1 MG/DL (ref 2.3–4.7)
PLATELET # BLD AUTO: 392 X10(3)/MCL (ref 130–400)
PMV BLD AUTO: 10.3 FL (ref 7.4–10.4)
PROT UR STRIP-MCNC: 84.8 MG/DL
PTH-INTACT SERPL-MCNC: 60.4 PG/ML (ref 8.7–77)
RBC # BLD AUTO: 5.1 X10(6)/MCL (ref 4.7–6.1)
TIBC SERPL-MCNC: 286 UG/DL (ref 69–240)
TIBC SERPL-MCNC: 361 UG/DL (ref 250–450)
TRANSFERRIN SERPL-MCNC: 326 MG/DL (ref 163–344)
WBC # SPEC AUTO: 8.36 X10(3)/MCL (ref 4.5–11.5)

## 2023-10-26 PROCEDURE — 83540 ASSAY OF IRON: CPT

## 2023-10-26 PROCEDURE — 36415 COLL VENOUS BLD VENIPUNCTURE: CPT

## 2023-10-26 PROCEDURE — 82306 VITAMIN D 25 HYDROXY: CPT | Mod: GA

## 2023-10-26 PROCEDURE — 82570 ASSAY OF URINE CREATININE: CPT

## 2023-10-26 PROCEDURE — 83735 ASSAY OF MAGNESIUM: CPT

## 2023-10-26 PROCEDURE — 84100 ASSAY OF PHOSPHORUS: CPT

## 2023-10-26 PROCEDURE — 85025 COMPLETE CBC W/AUTO DIFF WBC: CPT

## 2023-10-26 PROCEDURE — 83970 ASSAY OF PARATHORMONE: CPT

## 2023-10-26 PROCEDURE — 82728 ASSAY OF FERRITIN: CPT

## 2023-10-26 PROCEDURE — 84156 ASSAY OF PROTEIN URINE: CPT

## 2023-10-30 ENCOUNTER — APPOINTMENT (OUTPATIENT)
Dept: LAB | Facility: HOSPITAL | Age: 68
End: 2023-10-30
Attending: INTERNAL MEDICINE
Payer: MEDICARE

## 2023-10-30 DIAGNOSIS — Z95.2 HEART VALVE REPLACED BY TRANSPLANT: Primary | ICD-10-CM

## 2023-10-30 DIAGNOSIS — N18.32 CHRONIC KIDNEY DISEASE (CKD) STAGE G3B/A1, MODERATELY DECREASED GLOMERULAR FILTRATION RATE (GFR) BETWEEN 30-44 ML/MIN/1.73 SQUARE METER AND ALBUMINURIA CREATININE RATIO LESS THAN 30 MG/G: ICD-10-CM

## 2023-10-30 LAB
ALBUMIN SERPL-MCNC: 3.9 G/DL (ref 3.4–4.8)
ALBUMIN/GLOB SERPL: 1.2 RATIO (ref 1.1–2)
ALP SERPL-CCNC: 124 UNIT/L (ref 40–150)
ALT SERPL-CCNC: 37 UNIT/L (ref 0–55)
AST SERPL-CCNC: 35 UNIT/L (ref 5–34)
BILIRUB SERPL-MCNC: 0.7 MG/DL
BUN SERPL-MCNC: 19 MG/DL (ref 8.4–25.7)
CALCIUM SERPL-MCNC: 9.5 MG/DL (ref 8.8–10)
CHLORIDE SERPL-SCNC: 101 MMOL/L (ref 98–107)
CO2 SERPL-SCNC: 26 MMOL/L (ref 23–31)
CREAT SERPL-MCNC: 2 MG/DL (ref 0.73–1.18)
GFR SERPLBLD CREATININE-BSD FMLA CKD-EPI: 35 MLS/MIN/1.73/M2
GLOBULIN SER-MCNC: 3.3 GM/DL (ref 2.4–3.5)
GLUCOSE SERPL-MCNC: 93 MG/DL (ref 82–115)
INR PPP: 2.5
POTASSIUM SERPL-SCNC: 4.3 MMOL/L (ref 3.5–5.1)
PROT SERPL-MCNC: 7.2 GM/DL (ref 5.8–7.6)
PROTHROMBIN TIME: 26.5 SECONDS (ref 12.5–14.5)
SODIUM SERPL-SCNC: 141 MMOL/L (ref 136–145)

## 2023-10-30 PROCEDURE — 85610 PROTHROMBIN TIME: CPT

## 2023-10-30 PROCEDURE — 36415 COLL VENOUS BLD VENIPUNCTURE: CPT

## 2023-10-30 PROCEDURE — 80053 COMPREHEN METABOLIC PANEL: CPT

## 2023-11-01 ENCOUNTER — DOCUMENT SCAN (OUTPATIENT)
Dept: HOME HEALTH SERVICES | Facility: HOSPITAL | Age: 68
End: 2023-11-01
Payer: MEDICARE

## 2023-11-07 ENCOUNTER — ANTI-COAG VISIT (OUTPATIENT)
Dept: CARDIOLOGY | Facility: HOSPITAL | Age: 68
End: 2023-11-07
Payer: MEDICARE

## 2023-11-07 DIAGNOSIS — Z95.2 H/O MECHANICAL AORTIC VALVE REPLACEMENT: Primary | ICD-10-CM

## 2023-11-07 DIAGNOSIS — Z79.01 LONG TERM (CURRENT) USE OF ANTICOAGULANTS: ICD-10-CM

## 2023-11-07 LAB
CTP QC/QA: YES
INR PPP: 3.9 (ref 2–3)
PROTHROMBIN TIME, POC: 47.4 (ref 2–3)

## 2023-11-07 PROCEDURE — 99211 OFF/OP EST MAY X REQ PHY/QHP: CPT

## 2023-11-07 PROCEDURE — 85610 PROTHROMBIN TIME: CPT

## 2023-11-07 NOTE — PROGRESS NOTES
POC pt/inr performed.  Pt will take 1.25 mg of warfarin today, then resume with dosage decrease.

## 2023-11-10 ENCOUNTER — EXTERNAL HOME HEALTH (OUTPATIENT)
Dept: HOME HEALTH SERVICES | Facility: HOSPITAL | Age: 68
End: 2023-11-10
Payer: MEDICARE

## 2023-11-13 ENCOUNTER — DOCUMENT SCAN (OUTPATIENT)
Dept: HOME HEALTH SERVICES | Facility: HOSPITAL | Age: 68
End: 2023-11-13
Payer: MEDICARE

## 2023-11-14 ENCOUNTER — DOCUMENT SCAN (OUTPATIENT)
Dept: HOME HEALTH SERVICES | Facility: HOSPITAL | Age: 68
End: 2023-11-14
Payer: MEDICARE

## 2023-11-15 ENCOUNTER — DOCUMENT SCAN (OUTPATIENT)
Dept: HOME HEALTH SERVICES | Facility: HOSPITAL | Age: 68
End: 2023-11-15
Payer: MEDICARE

## 2023-11-16 ENCOUNTER — ANTI-COAG VISIT (OUTPATIENT)
Dept: CARDIOLOGY | Facility: HOSPITAL | Age: 68
End: 2023-11-16
Payer: MEDICARE

## 2023-11-16 DIAGNOSIS — Z95.2 H/O MECHANICAL AORTIC VALVE REPLACEMENT: Primary | ICD-10-CM

## 2023-11-16 DIAGNOSIS — Z79.01 LONG TERM (CURRENT) USE OF ANTICOAGULANTS: ICD-10-CM

## 2023-11-16 LAB
CTP QC/QA: YES
INR PPP: 2.9 (ref 2–3)
PROTHROMBIN TIME, POC: 34.8 (ref 2–3)

## 2023-11-16 PROCEDURE — 85610 PROTHROMBIN TIME: CPT

## 2023-11-20 DIAGNOSIS — N18.32 STAGE 3B CHRONIC KIDNEY DISEASE: Primary | ICD-10-CM

## 2023-11-20 RX ORDER — POTASSIUM CHLORIDE 750 MG/1
10 TABLET, EXTENDED RELEASE ORAL 2 TIMES DAILY
Qty: 180 TABLET | Refills: 3 | Status: SHIPPED | OUTPATIENT
Start: 2023-11-20

## 2023-11-21 DIAGNOSIS — I25.10 CORONARY ARTERY DISEASE, UNSPECIFIED VESSEL OR LESION TYPE, UNSPECIFIED WHETHER ANGINA PRESENT, UNSPECIFIED WHETHER NATIVE OR TRANSPLANTED HEART: Primary | ICD-10-CM

## 2023-11-21 RX ORDER — METOPROLOL TARTRATE 50 MG/1
50 TABLET ORAL 2 TIMES DAILY
Qty: 60 TABLET | Refills: 11 | Status: SHIPPED | OUTPATIENT
Start: 2023-11-21

## 2023-11-30 ENCOUNTER — DOCUMENT SCAN (OUTPATIENT)
Dept: HOME HEALTH SERVICES | Facility: HOSPITAL | Age: 68
End: 2023-11-30
Payer: MEDICARE

## 2023-12-07 ENCOUNTER — ANTI-COAG VISIT (OUTPATIENT)
Dept: CARDIOLOGY | Facility: HOSPITAL | Age: 68
End: 2023-12-07
Payer: MEDICARE

## 2023-12-07 DIAGNOSIS — Z79.01 LONG TERM (CURRENT) USE OF ANTICOAGULANTS: ICD-10-CM

## 2023-12-07 DIAGNOSIS — Z95.2 H/O MECHANICAL AORTIC VALVE REPLACEMENT: Primary | ICD-10-CM

## 2023-12-07 LAB
INR PPP: 4
PROTHROMBIN TIME: 38.2 SECONDS (ref 12.5–14.5)

## 2023-12-07 PROCEDURE — 36415 COLL VENOUS BLD VENIPUNCTURE: CPT

## 2023-12-07 PROCEDURE — 85610 PROTHROMBIN TIME: CPT

## 2023-12-07 NOTE — PROGRESS NOTES
POC pt/inr performed.  Noted pt/inr 63.4/5.3  Lab drawn per protocol.  Pt will hold warfarin today, have a portion of greens daily for 2 days, the resume dosing.  He will take warfarin on tomorrow 12/8/23.  He has no s/s of bleeding.  He states he has too much cranberry for Thanksgiving.

## 2023-12-14 ENCOUNTER — ANTI-COAG VISIT (OUTPATIENT)
Dept: CARDIOLOGY | Facility: HOSPITAL | Age: 68
End: 2023-12-14
Payer: MEDICARE

## 2023-12-14 DIAGNOSIS — Z79.01 LONG TERM (CURRENT) USE OF ANTICOAGULANTS: ICD-10-CM

## 2023-12-14 DIAGNOSIS — Z95.2 H/O MECHANICAL AORTIC VALVE REPLACEMENT: Primary | ICD-10-CM

## 2023-12-14 LAB
CTP QC/QA: YES
INR PPP: 2.3 (ref 2–3)
PROTHROMBIN TIME, POC: 27.7 (ref 2–3)

## 2023-12-14 PROCEDURE — 85610 PROTHROMBIN TIME: CPT

## 2023-12-14 NOTE — PROGRESS NOTES
POC pt/inr performed.  Pt taking only 5 mg of warfarin on Monday, changed dosing to 5 mg on Thursdays also.

## 2023-12-18 ENCOUNTER — DOCUMENT SCAN (OUTPATIENT)
Dept: HOME HEALTH SERVICES | Facility: HOSPITAL | Age: 68
End: 2023-12-18
Payer: MEDICARE

## 2024-01-04 ENCOUNTER — ANTI-COAG VISIT (OUTPATIENT)
Dept: CARDIOLOGY | Facility: HOSPITAL | Age: 69
End: 2024-01-04
Payer: MEDICARE

## 2024-01-04 DIAGNOSIS — Z79.01 LONG TERM (CURRENT) USE OF ANTICOAGULANTS: ICD-10-CM

## 2024-01-04 DIAGNOSIS — Z95.2 H/O MECHANICAL AORTIC VALVE REPLACEMENT: Primary | ICD-10-CM

## 2024-01-04 LAB
CTP QC/QA: YES
INR PPP: 2.2 (ref 2–3)
PROTHROMBIN TIME, POC: 26.7 (ref 2–3)

## 2024-01-04 PROCEDURE — 99211 OFF/OP EST MAY X REQ PHY/QHP: CPT

## 2024-01-04 PROCEDURE — 85610 PROTHROMBIN TIME: CPT

## 2024-01-04 NOTE — PROGRESS NOTES
POC pt/inr performed.  Pt will take 7.5 mg of warfarin today, then will resume with increased dosing.

## 2024-01-17 ENCOUNTER — APPOINTMENT (OUTPATIENT)
Dept: LAB | Facility: HOSPITAL | Age: 69
End: 2024-01-17
Attending: INTERNAL MEDICINE
Payer: MEDICARE

## 2024-01-17 ENCOUNTER — TELEPHONE (OUTPATIENT)
Dept: INTERNAL MEDICINE | Facility: CLINIC | Age: 69
End: 2024-01-17
Payer: MEDICARE

## 2024-01-17 DIAGNOSIS — I12.9 PARENCHYMAL RENAL HYPERTENSION: ICD-10-CM

## 2024-01-17 DIAGNOSIS — N18.6 TYPE 2 DIABETES MELLITUS WITH END-STAGE RENAL DISEASE: ICD-10-CM

## 2024-01-17 DIAGNOSIS — D63.1 ANEMIA OF CHRONIC RENAL FAILURE, UNSPECIFIED CKD STAGE: ICD-10-CM

## 2024-01-17 DIAGNOSIS — E11.22 TYPE 2 DIABETES MELLITUS WITH END-STAGE RENAL DISEASE: ICD-10-CM

## 2024-01-17 DIAGNOSIS — N18.32 CHRONIC KIDNEY DISEASE (CKD) STAGE G3B/A1, MODERATELY DECREASED GLOMERULAR FILTRATION RATE (GFR) BETWEEN 30-44 ML/MIN/1.73 SQUARE METER AND ALBUMINURIA CREATININE RATIO LESS THAN 30 MG/G: ICD-10-CM

## 2024-01-17 DIAGNOSIS — N18.9 ANEMIA OF CHRONIC RENAL FAILURE, UNSPECIFIED CKD STAGE: ICD-10-CM

## 2024-01-17 DIAGNOSIS — I10 ESSENTIAL HYPERTENSION, MALIGNANT: Primary | ICD-10-CM

## 2024-01-17 LAB
ALBUMIN SERPL-MCNC: 3.9 G/DL (ref 3.4–4.8)
ALBUMIN/GLOB SERPL: 1.1 RATIO (ref 1.1–2)
ALP SERPL-CCNC: 94 UNIT/L (ref 40–150)
ALT SERPL-CCNC: 35 UNIT/L (ref 0–55)
AST SERPL-CCNC: 39 UNIT/L (ref 5–34)
BASOPHILS # BLD AUTO: 0.15 X10(3)/MCL
BASOPHILS NFR BLD AUTO: 1.6 %
BILIRUB SERPL-MCNC: 0.6 MG/DL
BUN SERPL-MCNC: 25.2 MG/DL (ref 8.4–25.7)
CALCIUM SERPL-MCNC: 9.5 MG/DL (ref 8.8–10)
CHLORIDE SERPL-SCNC: 103 MMOL/L (ref 98–107)
CO2 SERPL-SCNC: 33 MMOL/L (ref 23–31)
CREAT SERPL-MCNC: 2.21 MG/DL (ref 0.73–1.18)
EOSINOPHIL # BLD AUTO: 0.38 X10(3)/MCL (ref 0–0.9)
EOSINOPHIL NFR BLD AUTO: 4.1 %
ERYTHROCYTE [DISTWIDTH] IN BLOOD BY AUTOMATED COUNT: 15 % (ref 11.5–17)
GFR SERPLBLD CREATININE-BSD FMLA CKD-EPI: 31 MLS/MIN/1.73/M2
GLOBULIN SER-MCNC: 3.4 GM/DL (ref 2.4–3.5)
GLUCOSE SERPL-MCNC: 100 MG/DL (ref 82–115)
HCT VFR BLD AUTO: 44.8 % (ref 42–52)
HGB BLD-MCNC: 14.2 G/DL (ref 14–18)
IMM GRANULOCYTES # BLD AUTO: 0.04 X10(3)/MCL (ref 0–0.04)
IMM GRANULOCYTES NFR BLD AUTO: 0.4 %
LYMPHOCYTES # BLD AUTO: 1.4 X10(3)/MCL (ref 0.6–4.6)
LYMPHOCYTES NFR BLD AUTO: 14.9 %
MAGNESIUM SERPL-MCNC: 2.4 MG/DL (ref 1.6–2.6)
MCH RBC QN AUTO: 29.8 PG (ref 27–31)
MCHC RBC AUTO-ENTMCNC: 31.7 G/DL (ref 33–36)
MCV RBC AUTO: 94.1 FL (ref 80–94)
MONOCYTES # BLD AUTO: 1.53 X10(3)/MCL (ref 0.1–1.3)
MONOCYTES NFR BLD AUTO: 16.3 %
NEUTROPHILS # BLD AUTO: 5.88 X10(3)/MCL (ref 2.1–9.2)
NEUTROPHILS NFR BLD AUTO: 62.7 %
NRBC BLD AUTO-RTO: 0 %
PHOSPHATE SERPL-MCNC: 3.3 MG/DL (ref 2.3–4.7)
PLATELET # BLD AUTO: 348 X10(3)/MCL (ref 130–400)
PMV BLD AUTO: 10.3 FL (ref 7.4–10.4)
POTASSIUM SERPL-SCNC: 4.2 MMOL/L (ref 3.5–5.1)
PROT SERPL-MCNC: 7.3 GM/DL (ref 5.8–7.6)
RBC # BLD AUTO: 4.76 X10(6)/MCL (ref 4.7–6.1)
SODIUM SERPL-SCNC: 143 MMOL/L (ref 136–145)
WBC # SPEC AUTO: 9.38 X10(3)/MCL (ref 4.5–11.5)

## 2024-01-17 PROCEDURE — 80053 COMPREHEN METABOLIC PANEL: CPT

## 2024-01-17 PROCEDURE — 85025 COMPLETE CBC W/AUTO DIFF WBC: CPT

## 2024-01-17 PROCEDURE — 36415 COLL VENOUS BLD VENIPUNCTURE: CPT

## 2024-01-17 PROCEDURE — 83735 ASSAY OF MAGNESIUM: CPT

## 2024-01-17 PROCEDURE — 84100 ASSAY OF PHOSPHORUS: CPT

## 2024-01-19 ENCOUNTER — LAB VISIT (OUTPATIENT)
Dept: LAB | Facility: HOSPITAL | Age: 69
End: 2024-01-19
Attending: INTERNAL MEDICINE
Payer: MEDICARE

## 2024-01-19 DIAGNOSIS — N18.9 ANEMIA OF CHRONIC RENAL FAILURE, UNSPECIFIED CKD STAGE: ICD-10-CM

## 2024-01-19 DIAGNOSIS — N18.6 TYPE 2 DIABETES MELLITUS WITH END-STAGE RENAL DISEASE: ICD-10-CM

## 2024-01-19 DIAGNOSIS — N18.32 CHRONIC KIDNEY DISEASE (CKD) STAGE G3B/A1, MODERATELY DECREASED GLOMERULAR FILTRATION RATE (GFR) BETWEEN 30-44 ML/MIN/1.73 SQUARE METER AND ALBUMINURIA CREATININE RATIO LESS THAN 30 MG/G: ICD-10-CM

## 2024-01-19 DIAGNOSIS — I10 HYPERTENSION, UNSPECIFIED TYPE: Primary | ICD-10-CM

## 2024-01-19 DIAGNOSIS — I12.9 PARENCHYMAL RENAL HYPERTENSION: ICD-10-CM

## 2024-01-19 DIAGNOSIS — D63.1 ANEMIA OF CHRONIC RENAL FAILURE, UNSPECIFIED CKD STAGE: ICD-10-CM

## 2024-01-19 DIAGNOSIS — E11.22 TYPE 2 DIABETES MELLITUS WITH END-STAGE RENAL DISEASE: ICD-10-CM

## 2024-01-19 LAB
CREAT 24H UR-MCNC: 1152.8 MG/DAY (ref 710–1650)
CREAT UR-MCNC: 52.4 MG/DL (ref 63–166)
PROT 24H UR-MCNC: 305.8 MG/24 H (ref 0–165)
PROT UR STRIP-MCNC: 13.9 MG/DL
TOTAL VOLUME  (OHS): 2200 ML
TOTAL VOLUME  (OHS): 2200 ML

## 2024-01-19 PROCEDURE — 84156 ASSAY OF PROTEIN URINE: CPT

## 2024-01-19 PROCEDURE — 82570 ASSAY OF URINE CREATININE: CPT

## 2024-01-22 ENCOUNTER — ANTI-COAG VISIT (OUTPATIENT)
Dept: CARDIOLOGY | Facility: HOSPITAL | Age: 69
End: 2024-01-22
Payer: MEDICARE

## 2024-01-22 DIAGNOSIS — Z79.01 LONG TERM (CURRENT) USE OF ANTICOAGULANTS: ICD-10-CM

## 2024-01-22 DIAGNOSIS — Z95.2 H/O MECHANICAL AORTIC VALVE REPLACEMENT: Primary | ICD-10-CM

## 2024-01-22 LAB
CTP QC/QA: YES
INR PPP: 2.7 (ref 2–3)
PROTHROMBIN TIME, POC: 32.1 (ref 2–3)

## 2024-01-22 PROCEDURE — 85610 PROTHROMBIN TIME: CPT

## 2024-01-29 ENCOUNTER — LAB VISIT (OUTPATIENT)
Dept: LAB | Facility: HOSPITAL | Age: 69
End: 2024-01-29
Attending: INTERNAL MEDICINE
Payer: MEDICARE

## 2024-01-29 DIAGNOSIS — D63.1 ANEMIA DUE TO STAGE 3B CHRONIC KIDNEY DISEASE: ICD-10-CM

## 2024-01-29 DIAGNOSIS — N18.32 ANEMIA DUE TO STAGE 3B CHRONIC KIDNEY DISEASE: ICD-10-CM

## 2024-01-29 DIAGNOSIS — E11.9 TYPE 2 DIABETES MELLITUS WITHOUT COMPLICATION, WITHOUT LONG-TERM CURRENT USE OF INSULIN: Chronic | ICD-10-CM

## 2024-01-29 DIAGNOSIS — N18.32 STAGE 3B CHRONIC KIDNEY DISEASE: Chronic | ICD-10-CM

## 2024-01-29 LAB
ALBUMIN SERPL-MCNC: 4.2 G/DL (ref 3.4–4.8)
ALBUMIN/GLOB SERPL: 1.2 RATIO (ref 1.1–2)
ALP SERPL-CCNC: 105 UNIT/L (ref 40–150)
ALT SERPL-CCNC: 42 UNIT/L (ref 0–55)
AST SERPL-CCNC: 44 UNIT/L (ref 5–34)
BASOPHILS # BLD AUTO: 0.12 X10(3)/MCL
BASOPHILS NFR BLD AUTO: 1.2 %
BILIRUB SERPL-MCNC: 0.5 MG/DL
BUN SERPL-MCNC: 39.2 MG/DL (ref 8.4–25.7)
CALCIUM SERPL-MCNC: 10 MG/DL (ref 8.8–10)
CHLORIDE SERPL-SCNC: 100 MMOL/L (ref 98–107)
CO2 SERPL-SCNC: 29 MMOL/L (ref 23–31)
CREAT SERPL-MCNC: 2.09 MG/DL (ref 0.73–1.18)
EOSINOPHIL # BLD AUTO: 0.45 X10(3)/MCL (ref 0–0.9)
EOSINOPHIL NFR BLD AUTO: 4.4 %
ERYTHROCYTE [DISTWIDTH] IN BLOOD BY AUTOMATED COUNT: 15 % (ref 11.5–17)
EST. AVERAGE GLUCOSE BLD GHB EST-MCNC: 134.1 MG/DL
GFR SERPLBLD CREATININE-BSD FMLA CKD-EPI: 33 MLS/MIN/1.73/M2
GLOBULIN SER-MCNC: 3.5 GM/DL (ref 2.4–3.5)
GLUCOSE SERPL-MCNC: 111 MG/DL (ref 82–115)
HBA1C MFR BLD: 6.3 %
HCT VFR BLD AUTO: 50.6 % (ref 42–52)
HGB BLD-MCNC: 15.3 G/DL (ref 14–18)
IMM GRANULOCYTES # BLD AUTO: 0.04 X10(3)/MCL (ref 0–0.04)
IMM GRANULOCYTES NFR BLD AUTO: 0.4 %
IRON SATN MFR SERPL: 31 % (ref 20–50)
IRON SERPL-MCNC: 113 UG/DL (ref 65–175)
LYMPHOCYTES # BLD AUTO: 1.55 X10(3)/MCL (ref 0.6–4.6)
LYMPHOCYTES NFR BLD AUTO: 15.1 %
MCH RBC QN AUTO: 29.5 PG (ref 27–31)
MCHC RBC AUTO-ENTMCNC: 30.2 G/DL (ref 33–36)
MCV RBC AUTO: 97.5 FL (ref 80–94)
MONOCYTES # BLD AUTO: 1.63 X10(3)/MCL (ref 0.1–1.3)
MONOCYTES NFR BLD AUTO: 15.9 %
NEUTROPHILS # BLD AUTO: 6.47 X10(3)/MCL (ref 2.1–9.2)
NEUTROPHILS NFR BLD AUTO: 63 %
NRBC BLD AUTO-RTO: 0 %
PLATELET # BLD AUTO: 343 X10(3)/MCL (ref 130–400)
PMV BLD AUTO: 9.9 FL (ref 7.4–10.4)
POTASSIUM SERPL-SCNC: 3.9 MMOL/L (ref 3.5–5.1)
PROT SERPL-MCNC: 7.7 GM/DL (ref 5.8–7.6)
RBC # BLD AUTO: 5.19 X10(6)/MCL (ref 4.7–6.1)
SODIUM SERPL-SCNC: 142 MMOL/L (ref 136–145)
TIBC SERPL-MCNC: 250 UG/DL (ref 69–240)
TIBC SERPL-MCNC: 363 UG/DL (ref 250–450)
TRANSFERRIN SERPL-MCNC: 349 MG/DL (ref 163–344)
WBC # SPEC AUTO: 10.26 X10(3)/MCL (ref 4.5–11.5)

## 2024-01-29 PROCEDURE — 85025 COMPLETE CBC W/AUTO DIFF WBC: CPT

## 2024-01-29 PROCEDURE — 36415 COLL VENOUS BLD VENIPUNCTURE: CPT

## 2024-01-29 PROCEDURE — 83036 HEMOGLOBIN GLYCOSYLATED A1C: CPT

## 2024-01-29 PROCEDURE — 80053 COMPREHEN METABOLIC PANEL: CPT

## 2024-01-29 PROCEDURE — 83540 ASSAY OF IRON: CPT

## 2024-01-31 PROBLEM — C85.90 NON-HODGKIN'S LYMPHOMA, UNSPECIFIED BODY REGION, UNSPECIFIED NON-HODGKIN LYMPHOMA TYPE: Chronic | Status: RESOLVED | Noted: 2023-01-26 | Resolved: 2024-01-31

## 2024-02-01 ENCOUNTER — OFFICE VISIT (OUTPATIENT)
Dept: INTERNAL MEDICINE | Facility: CLINIC | Age: 69
End: 2024-02-01
Payer: MEDICARE

## 2024-02-01 VITALS
DIASTOLIC BLOOD PRESSURE: 68 MMHG | WEIGHT: 183.81 LBS | SYSTOLIC BLOOD PRESSURE: 110 MMHG | OXYGEN SATURATION: 96 % | HEART RATE: 84 BPM | HEIGHT: 72 IN | BODY MASS INDEX: 24.89 KG/M2

## 2024-02-01 DIAGNOSIS — E03.9 HYPOTHYROIDISM, UNSPECIFIED TYPE: ICD-10-CM

## 2024-02-01 DIAGNOSIS — E03.9 ACQUIRED HYPOTHYROIDISM: Chronic | ICD-10-CM

## 2024-02-01 DIAGNOSIS — I48.0 PAROXYSMAL ATRIAL FIBRILLATION: ICD-10-CM

## 2024-02-01 DIAGNOSIS — Z12.5 SCREENING FOR MALIGNANT NEOPLASM OF PROSTATE: ICD-10-CM

## 2024-02-01 DIAGNOSIS — Z12.11 SCREEN FOR COLON CANCER: Primary | ICD-10-CM

## 2024-02-01 DIAGNOSIS — N18.32 STAGE 3B CHRONIC KIDNEY DISEASE: Chronic | ICD-10-CM

## 2024-02-01 DIAGNOSIS — E11.9 TYPE 2 DIABETES MELLITUS WITHOUT COMPLICATION, WITHOUT LONG-TERM CURRENT USE OF INSULIN: Chronic | ICD-10-CM

## 2024-02-01 PROCEDURE — 99213 OFFICE O/P EST LOW 20 MIN: CPT | Mod: ,,, | Performed by: INTERNAL MEDICINE

## 2024-02-01 RX ORDER — RAMIPRIL 2.5 MG/1
2.5 CAPSULE ORAL
COMMUNITY
Start: 2023-11-01 | End: 2024-10-31

## 2024-02-01 RX ORDER — LEVOTHYROXINE SODIUM 112 UG/1
112 TABLET ORAL
Qty: 30 TABLET | Refills: 11 | Status: SHIPPED | OUTPATIENT
Start: 2024-02-01

## 2024-02-01 RX ORDER — FERROUS SULFATE, DRIED 159(45)MG
TABLET, EXTENDED RELEASE ORAL
COMMUNITY
Start: 2023-08-25

## 2024-02-01 RX ORDER — HYDROCORTISONE ACETATE 25 MG/1
25 SUPPOSITORY RECTAL 2 TIMES DAILY
Qty: 20 SUPPOSITORY | Refills: 0 | Status: SHIPPED | OUTPATIENT
Start: 2024-02-01 | End: 2024-05-29

## 2024-02-01 NOTE — PROGRESS NOTES
Cory Watts MD        PATIENT NAME: Bertin Alonzo  : 1955  DATE: 24  MRN: [id number]      Billing Provider: Cory Watts MD  Level of Service: [place] OFFICE/OUTPT VISIT, EST, LEVL III, 20-29 MIN  Patient PCP Information       Provider PCP Type    Cory Watts MD General            Reason for Visit / Chief Complaint: Follow-up (6 mth f/u/)       Update PCP  Update Chief Complaint         History of Present Illness / Problem Focused Workflow     Bertin Alonzo presents to the clinic with Follow-up (6 mth f/u/)     Nor is here for six-month follow-up   His biggest issues increased fatigue and tiredness and he falls asleep   His blood pressure has been low in Dr. Peguero cut his beta-blocker in half a few days ago           Review of Systems   Review of Systems   Constitutional: Negative.    HENT: Negative.     Eyes: Negative.    Respiratory: Negative.     Cardiovascular: Negative.    Gastrointestinal: Negative.    Endocrine: Negative.    Genitourinary: Negative.    Musculoskeletal: Negative.    Integumentary:  Negative.   Neurological: Negative.    Psychiatric/Behavioral: Negative.          Medical / Social / Family History     Past Medical History:   Diagnosis Date    CAD (coronary artery disease)     CHF (congestive heart failure), NYHA class I, chronic, diastolic     Hypothyroidism, unspecified     Paroxysmal atrial fibrillation     S/P aortic valve replacement and aortoplasty     S/P CABG (coronary artery bypass graft)     Type 2 diabetes mellitus without complication, without long-term current use of insulin        Past Surgical History:   Procedure Laterality Date    AORTIC VALUE      AORTIC VALVE REPLACEMENT      w aortoplasty    APPENDECTOMY      BYPASS OF MESENTERIC ARTERY      CARDIAC VALVE REPLACEMENT  2006    CATARACT EXTRACTION, BILATERAL      CORONARY ARTERY BYPASS GRAFT  2021    EYE SURGERY      Cataracts    PLEURODESIS USING TALC       SPLENECTOMY, TOTAL      STENTS      CORONARY    THYROID SURGERY         Social History  Mr. Alonzo  reports that he has quit smoking. His smoking use included cigarettes and cigars. He has never used smokeless tobacco. He reports that he does not currently use alcohol after a past usage of about 1.0 standard drink of alcohol per week. He reports that he does not use drugs.    Family History  Mr.'s Alonzo  family history includes Cancer in his father; Hodgkin's lymphoma in his mother.    Medications and Allergies     Medications  Outpatient Medications Marked as Taking for the 2/1/24 encounter (Office Visit) with Cory Villanueva MD   Medication Sig Dispense Refill    amiodarone (PACERONE) 200 MG Tab Take 200 mg by mouth once daily.      cholecalciferol, vitamin D3, (VITAMIN D3) 50 mcg (2,000 unit) Cap capsule Take 2,000 Units by mouth once daily at 6am.      clopidogreL (PLAVIX) 75 mg tablet Take 75 mg by mouth once daily.      ferrous sulfate, dried (IRON) 159 mg (45 mg iron) TbSR       furosemide (LASIX) 40 MG tablet Take 1 tablet (40 mg total) by mouth 2 (two) times daily. 180 tablet 3    levothyroxine (SYNTHROID) 112 MCG tablet Take 1 tablet (112 mcg total) by mouth before breakfast. 30 tablet 11    metFORMIN (GLUCOPHAGE) 500 MG tablet Take 1 tablet (500 mg total) by mouth once daily. (Patient taking differently: Take 500 mg by mouth every morning.) 30 tablet 12    metOLazone (ZAROXOLYN) 5 MG tablet Take 5 mg by mouth daily as needed.      metoprolol tartrate (LOPRESSOR) 50 MG tablet TAKE 1 TABLET(50 MG) BY MOUTH TWICE DAILY (Patient taking differently: Take 25 mg by mouth 2 (two) times daily.) 60 tablet 11    nitroGLYCERIN (NITROSTAT) 0.4 MG SL tablet Place 0.4 mg under the tongue daily as needed.      potassium chloride SA (K-DUR,KLOR-CON M) 10 MEQ tablet TAKE 1 TABLET BY MOUTH TWICE DAILY 180 tablet 3    ramipriL (ALTACE) 2.5 MG capsule Take 2.5 mg by mouth.      rosuvastatin (CRESTOR) 40 MG Tab  Take 40 mg by mouth nightly.      warfarin (COUMADIN) 2.5 MG tablet Take 1 tablet (2.5 mg total) by mouth every Tuesday, Thursday, Saturday, Sunday. 5mg on Mon, Wed., Fri 135 tablet 3    [DISCONTINUED] ferrous sulfate 140 mg (45 mg iron) TbSR Take by mouth.         Allergies  Review of patient's allergies indicates:   Allergen Reactions    Digoxin      Other reaction(s): Toxicity  Other reaction(s): Toxicity  Other reaction(s): Toxicity       Physical Examination     Vitals:    02/01/24 0938   BP: 110/68   Pulse: 84     Physical Exam  Vitals reviewed.   Constitutional:       Appearance: Normal appearance.   HENT:      Head: Normocephalic.      Right Ear: Tympanic membrane normal.      Left Ear: Tympanic membrane normal.      Nose: Nose normal.      Mouth/Throat:      Pharynx: Oropharynx is clear.   Eyes:      Extraocular Movements: Extraocular movements intact.      Pupils: Pupils are equal, round, and reactive to light.   Cardiovascular:      Rate and Rhythm: Normal rate and regular rhythm.      Pulses: Normal pulses.      Heart sounds: Normal heart sounds.   Pulmonary:      Effort: Pulmonary effort is normal.      Breath sounds: Normal breath sounds.   Abdominal:      General: Abdomen is flat. Bowel sounds are normal.      Palpations: Abdomen is soft.   Musculoskeletal:         General: Normal range of motion.      Cervical back: Normal range of motion.   Skin:     General: Skin is warm and dry.   Neurological:      General: No focal deficit present.      Mental Status: He is alert and oriented to person, place, and time.   Psychiatric:         Mood and Affect: Mood normal.         Behavior: Behavior normal.          Assessment and Plan (including Health Maintenance)      Problem List  Smart Sets  Document Outside HM   :    Plan:    ICD-10-CM ICD-9-CM   1. Type 2 diabetes mellitus without complication, without long-term current use of insulin  E11.9 250.00   2. Stage 3b chronic kidney disease  N18.32 585.3   3.  Acquired hypothyroidism  E03.9 244.9   4. Paroxysmal atrial fibrillation  I48.0 427.31       Problem List Items Addressed This Visit          Cardiac/Vascular    Paroxysmal atrial fibrillation (Chronic)     AFib stable continue medication            Renal/    Stage 3b chronic kidney disease (Chronic)     Under evaluation with Nephrology            Endocrine    Hypothyroidism, unspecified (Chronic)     Thyroid level stable continue medicine         Type 2 diabetes mellitus without complication, without long-term current use of insulin - Primary (Chronic)     A1c stable continue medication                   Health Maintenance Due   Topic Date Due    Hepatitis C Screening  Never done    Foot Exam  Never done    TETANUS VACCINE  Never done    RSV Vaccine (Age 60+ and Pregnant patients) (1 - 1-dose 60+ series) Never done    Abdominal Aortic Aneurysm Screening  Never done    Pneumococcal Vaccines (Age 65+) (3 of 3 - PPSV23 or PCV20) 12/04/2020    Shingles Vaccine (3 of 3) 06/06/2022    Eye Exam  08/30/2023    Influenza Vaccine (1) 09/01/2023    COVID-19 Vaccine (6 - 2023-24 season) 09/01/2023       Problem List Items Addressed This Visit          Cardiac/Vascular    Paroxysmal atrial fibrillation (Chronic)    Current Assessment & Plan     AFib stable continue medication            Renal/    Stage 3b chronic kidney disease (Chronic)    Current Assessment & Plan     Under evaluation with Nephrology            Endocrine    Hypothyroidism, unspecified (Chronic)    Current Assessment & Plan     Thyroid level stable continue medicine         Type 2 diabetes mellitus without complication, without long-term current use of insulin - Primary (Chronic)    Current Assessment & Plan     A1c stable continue medication            Health Maintenance Topics with due status: Not Due       Topic Last Completion Date    Lipid Panel 07/07/2023    Colorectal Cancer Screening 07/24/2023    Hemoglobin A1c 01/29/2024       Future Appointments    Date Time Provider Department Center   2/19/2024 10:00 AM [facility]   4/22/2024  1:40 PM VINCENT Peraza MD [facility]   8/1/2024 10:20 AM Cory Villanueva MD [place] [id number]            Signature:  Cory Villanueva MD  [facility]  [address] [place] [phone]    Date of encounter: 2/1/24

## 2024-02-19 ENCOUNTER — ANTI-COAG VISIT (OUTPATIENT)
Dept: CARDIOLOGY | Facility: HOSPITAL | Age: 69
End: 2024-02-19
Payer: MEDICARE

## 2024-02-19 DIAGNOSIS — Z79.01 LONG TERM (CURRENT) USE OF ANTICOAGULANTS: ICD-10-CM

## 2024-02-19 DIAGNOSIS — E11.9 TYPE 2 DIABETES MELLITUS WITHOUT COMPLICATION, WITHOUT LONG-TERM CURRENT USE OF INSULIN: ICD-10-CM

## 2024-02-19 DIAGNOSIS — Z95.2 H/O MECHANICAL AORTIC VALVE REPLACEMENT: Primary | ICD-10-CM

## 2024-02-19 LAB
CTP QC/QA: YES
INR PPP: 4.2 (ref 2–3)
PROTHROMBIN TIME, POC: 50.3 (ref 2–3)

## 2024-02-19 PROCEDURE — 99211 OFF/OP EST MAY X REQ PHY/QHP: CPT

## 2024-02-19 RX ORDER — METFORMIN HYDROCHLORIDE 500 MG/1
TABLET ORAL
Qty: 180 TABLET | Refills: 2 | Status: SHIPPED | OUTPATIENT
Start: 2024-02-19

## 2024-02-29 ENCOUNTER — TELEPHONE (OUTPATIENT)
Dept: INTERNAL MEDICINE | Facility: CLINIC | Age: 69
End: 2024-02-29
Payer: MEDICARE

## 2024-02-29 DIAGNOSIS — Z12.11 SCREEN FOR COLON CANCER: Primary | ICD-10-CM

## 2024-02-29 LAB — NONINV COLON CA DNA+OCC BLD SCRN STL QL: POSITIVE

## 2024-02-29 NOTE — TELEPHONE ENCOUNTER
Spoke with pt wife she stated understanding. Referral sent to Dr. Cory Vegas for further evaluation and colonoscopy.

## 2024-03-04 ENCOUNTER — ANTI-COAG VISIT (OUTPATIENT)
Dept: CARDIOLOGY | Facility: HOSPITAL | Age: 69
End: 2024-03-04
Payer: MEDICARE

## 2024-03-04 DIAGNOSIS — Z79.01 LONG TERM (CURRENT) USE OF ANTICOAGULANTS: ICD-10-CM

## 2024-03-04 DIAGNOSIS — Z95.2 H/O MECHANICAL AORTIC VALVE REPLACEMENT: Primary | ICD-10-CM

## 2024-03-04 LAB
CTP QC/QA: YES
INR PPP: 3.2 (ref 2–3)
PROTHROMBIN TIME, POC: 38.5 (ref 2–3)

## 2024-03-04 PROCEDURE — 99211 OFF/OP EST MAY X REQ PHY/QHP: CPT

## 2024-03-11 LAB
LEFT EYE DM RETINOPATHY: NEGATIVE
RIGHT EYE DM RETINOPATHY: NEGATIVE

## 2024-03-12 ENCOUNTER — PATIENT OUTREACH (OUTPATIENT)
Dept: ADMINISTRATIVE | Facility: HOSPITAL | Age: 69
End: 2024-03-12
Payer: MEDICARE

## 2024-03-12 NOTE — PROGRESS NOTES
02/29/24 Referral sent to Dr. Cory Vegas for further evaluation and colonoscopy.     The following record(s)  below were uploaded for Health Maintenance .    DM EYE EXAM 03/11/24    Population Health Outreach.

## 2024-03-21 ENCOUNTER — APPOINTMENT (OUTPATIENT)
Dept: LAB | Facility: HOSPITAL | Age: 69
End: 2024-03-21
Attending: INTERNAL MEDICINE
Payer: MEDICARE

## 2024-03-21 DIAGNOSIS — N18.9 ANEMIA OF CHRONIC RENAL FAILURE, UNSPECIFIED CKD STAGE: Primary | ICD-10-CM

## 2024-03-21 DIAGNOSIS — N18.6 TYPE 2 DIABETES MELLITUS WITH END-STAGE RENAL DISEASE: ICD-10-CM

## 2024-03-21 DIAGNOSIS — M79.671 RIGHT FOOT PAIN: ICD-10-CM

## 2024-03-21 DIAGNOSIS — D63.1 ANEMIA OF CHRONIC RENAL FAILURE, UNSPECIFIED CKD STAGE: Primary | ICD-10-CM

## 2024-03-21 DIAGNOSIS — E11.22 TYPE 2 DIABETES MELLITUS WITH END-STAGE RENAL DISEASE: ICD-10-CM

## 2024-03-21 DIAGNOSIS — N18.32 CHRONIC KIDNEY DISEASE (CKD) STAGE G3B/A1, MODERATELY DECREASED GLOMERULAR FILTRATION RATE (GFR) BETWEEN 30-44 ML/MIN/1.73 SQUARE METER AND ALBUMINURIA CREATININE RATIO LESS THAN 30 MG/G: ICD-10-CM

## 2024-03-21 DIAGNOSIS — I12.9 PARENCHYMAL RENAL HYPERTENSION: ICD-10-CM

## 2024-03-21 DIAGNOSIS — R80.9 PROTEINURIA, UNSPECIFIED TYPE: ICD-10-CM

## 2024-03-21 LAB
ALBUMIN SERPL-MCNC: 3.8 G/DL (ref 3.4–4.8)
ALBUMIN/GLOB SERPL: 1.2 RATIO (ref 1.1–2)
ALP SERPL-CCNC: 105 UNIT/L (ref 40–150)
ALT SERPL-CCNC: 28 UNIT/L (ref 0–55)
AST SERPL-CCNC: 35 UNIT/L (ref 5–34)
BASOPHILS # BLD AUTO: 0.11 X10(3)/MCL
BASOPHILS NFR BLD AUTO: 0.9 %
BILIRUB SERPL-MCNC: 0.5 MG/DL
BUN SERPL-MCNC: 25.8 MG/DL (ref 8.4–25.7)
CALCIUM SERPL-MCNC: 9.4 MG/DL (ref 8.8–10)
CHLORIDE SERPL-SCNC: 100 MMOL/L (ref 98–107)
CO2 SERPL-SCNC: 32 MMOL/L (ref 23–31)
CREAT SERPL-MCNC: 2.26 MG/DL (ref 0.73–1.18)
DEPRECATED CALCIDIOL+CALCIFEROL SERPL-MC: 93.4 NG/ML (ref 30–80)
EOSINOPHIL # BLD AUTO: 0.05 X10(3)/MCL (ref 0–0.9)
EOSINOPHIL NFR BLD AUTO: 0.4 %
ERYTHROCYTE [DISTWIDTH] IN BLOOD BY AUTOMATED COUNT: 16 % (ref 11.5–17)
GFR SERPLBLD CREATININE-BSD FMLA CKD-EPI: 30 MLS/MIN/1.73/M2
GLOBULIN SER-MCNC: 3.2 GM/DL (ref 2.4–3.5)
GLUCOSE SERPL-MCNC: 147 MG/DL (ref 82–115)
HCT VFR BLD AUTO: 48.2 % (ref 42–52)
HGB BLD-MCNC: 14.5 G/DL (ref 14–18)
IMM GRANULOCYTES # BLD AUTO: 0.05 X10(3)/MCL (ref 0–0.04)
IMM GRANULOCYTES NFR BLD AUTO: 0.4 %
LYMPHOCYTES # BLD AUTO: 1.31 X10(3)/MCL (ref 0.6–4.6)
LYMPHOCYTES NFR BLD AUTO: 11.3 %
MAGNESIUM SERPL-MCNC: 2.7 MG/DL (ref 1.6–2.6)
MCH RBC QN AUTO: 29.8 PG (ref 27–31)
MCHC RBC AUTO-ENTMCNC: 30.1 G/DL (ref 33–36)
MCV RBC AUTO: 99 FL (ref 80–94)
MONOCYTES # BLD AUTO: 1.7 X10(3)/MCL (ref 0.1–1.3)
MONOCYTES NFR BLD AUTO: 14.6 %
NEUTROPHILS # BLD AUTO: 8.42 X10(3)/MCL (ref 2.1–9.2)
NEUTROPHILS NFR BLD AUTO: 72.4 %
NRBC BLD AUTO-RTO: 0 %
PHOSPHATE SERPL-MCNC: 2.9 MG/DL (ref 2.3–4.7)
PLATELET # BLD AUTO: 383 X10(3)/MCL (ref 130–400)
PMV BLD AUTO: 9.9 FL (ref 7.4–10.4)
POTASSIUM SERPL-SCNC: 4 MMOL/L (ref 3.5–5.1)
PROT SERPL-MCNC: 7 GM/DL (ref 5.8–7.6)
PTH-INTACT SERPL-MCNC: 55.9 PG/ML (ref 8.7–77)
RBC # BLD AUTO: 4.87 X10(6)/MCL (ref 4.7–6.1)
SODIUM SERPL-SCNC: 141 MMOL/L (ref 136–145)
URATE SERPL-MCNC: 5.8 MG/DL (ref 3.5–7.2)
WBC # SPEC AUTO: 11.64 X10(3)/MCL (ref 4.5–11.5)

## 2024-03-21 PROCEDURE — 84550 ASSAY OF BLOOD/URIC ACID: CPT

## 2024-03-21 PROCEDURE — 85025 COMPLETE CBC W/AUTO DIFF WBC: CPT

## 2024-03-21 PROCEDURE — 83970 ASSAY OF PARATHORMONE: CPT

## 2024-03-21 PROCEDURE — 84100 ASSAY OF PHOSPHORUS: CPT

## 2024-03-21 PROCEDURE — 82306 VITAMIN D 25 HYDROXY: CPT | Mod: GA

## 2024-03-21 PROCEDURE — 80053 COMPREHEN METABOLIC PANEL: CPT

## 2024-03-21 PROCEDURE — 83735 ASSAY OF MAGNESIUM: CPT

## 2024-03-21 PROCEDURE — 36415 COLL VENOUS BLD VENIPUNCTURE: CPT

## 2024-03-25 ENCOUNTER — ANTI-COAG VISIT (OUTPATIENT)
Dept: CARDIOLOGY | Facility: HOSPITAL | Age: 69
End: 2024-03-25
Payer: MEDICARE

## 2024-03-25 DIAGNOSIS — Z79.01 LONG TERM (CURRENT) USE OF ANTICOAGULANTS: ICD-10-CM

## 2024-03-25 DIAGNOSIS — Z95.2 H/O MECHANICAL AORTIC VALVE REPLACEMENT: Primary | ICD-10-CM

## 2024-03-25 LAB
CTP QC/QA: YES
INR PPP: 4.7 (ref 2–3)
PROTHROMBIN TIME, POC: 56.3 (ref 2–3)

## 2024-03-25 PROCEDURE — 99211 OFF/OP EST MAY X REQ PHY/QHP: CPT

## 2024-04-04 ENCOUNTER — ANTI-COAG VISIT (OUTPATIENT)
Dept: CARDIOLOGY | Facility: HOSPITAL | Age: 69
End: 2024-04-04
Payer: MEDICARE

## 2024-04-04 DIAGNOSIS — Z79.01 LONG TERM (CURRENT) USE OF ANTICOAGULANTS: ICD-10-CM

## 2024-04-04 DIAGNOSIS — Z95.2 H/O MECHANICAL AORTIC VALVE REPLACEMENT: Primary | ICD-10-CM

## 2024-04-04 LAB
CTP QC/QA: YES
INR PPP: 2.4 (ref 2–3)
PROTHROMBIN TIME, POC: 29 (ref 2–3)

## 2024-04-04 PROCEDURE — 99211 OFF/OP EST MAY X REQ PHY/QHP: CPT

## 2024-04-11 ENCOUNTER — LAB VISIT (OUTPATIENT)
Dept: LAB | Facility: HOSPITAL | Age: 69
End: 2024-04-11
Attending: INTERNAL MEDICINE
Payer: MEDICARE

## 2024-04-11 DIAGNOSIS — N18.9 ANEMIA OF CHRONIC RENAL FAILURE, UNSPECIFIED CKD STAGE: ICD-10-CM

## 2024-04-11 DIAGNOSIS — D63.1 ANEMIA OF CHRONIC RENAL FAILURE, UNSPECIFIED CKD STAGE: ICD-10-CM

## 2024-04-11 DIAGNOSIS — R80.9 PROTEINURIA, UNSPECIFIED TYPE: ICD-10-CM

## 2024-04-11 DIAGNOSIS — N18.32 STAGE 3B CHRONIC KIDNEY DISEASE: Primary | ICD-10-CM

## 2024-04-11 DIAGNOSIS — E11.22 TYPE 2 DIABETES MELLITUS WITH END-STAGE RENAL DISEASE: ICD-10-CM

## 2024-04-11 DIAGNOSIS — N18.6 TYPE 2 DIABETES MELLITUS WITH END-STAGE RENAL DISEASE: ICD-10-CM

## 2024-04-11 DIAGNOSIS — I12.9 HYPERTENSION, RENAL: ICD-10-CM

## 2024-04-11 LAB
ALBUMIN SERPL-MCNC: 3.7 G/DL (ref 3.4–4.8)
ALBUMIN/GLOB SERPL: 1.2 RATIO (ref 1.1–2)
ALP SERPL-CCNC: 95 UNIT/L (ref 40–150)
ALT SERPL-CCNC: 27 UNIT/L (ref 0–55)
AST SERPL-CCNC: 30 UNIT/L (ref 5–34)
BASOPHILS # BLD AUTO: 0.11 X10(3)/MCL
BASOPHILS NFR BLD AUTO: 1.2 %
BILIRUB SERPL-MCNC: 0.8 MG/DL
BUN SERPL-MCNC: 18.2 MG/DL (ref 8.4–25.7)
CALCIUM SERPL-MCNC: 9.8 MG/DL (ref 8.8–10)
CHLORIDE SERPL-SCNC: 106 MMOL/L (ref 98–107)
CO2 SERPL-SCNC: 26 MMOL/L (ref 23–31)
CREAT SERPL-MCNC: 1.94 MG/DL (ref 0.73–1.18)
DEPRECATED CALCIDIOL+CALCIFEROL SERPL-MC: 83 NG/ML (ref 30–80)
EOSINOPHIL # BLD AUTO: 0.29 X10(3)/MCL (ref 0–0.9)
EOSINOPHIL NFR BLD AUTO: 3.1 %
ERYTHROCYTE [DISTWIDTH] IN BLOOD BY AUTOMATED COUNT: 16.3 % (ref 11.5–17)
GFR SERPLBLD CREATININE-BSD FMLA CKD-EPI: 37 MLS/MIN/1.73/M2
GLOBULIN SER-MCNC: 3 GM/DL (ref 2.4–3.5)
GLUCOSE SERPL-MCNC: 104 MG/DL (ref 82–115)
HCT VFR BLD AUTO: 45.6 % (ref 42–52)
HGB BLD-MCNC: 14.2 G/DL (ref 14–18)
IMM GRANULOCYTES # BLD AUTO: 0.03 X10(3)/MCL (ref 0–0.04)
IMM GRANULOCYTES NFR BLD AUTO: 0.3 %
LYMPHOCYTES # BLD AUTO: 0.83 X10(3)/MCL (ref 0.6–4.6)
LYMPHOCYTES NFR BLD AUTO: 9 %
MAGNESIUM SERPL-MCNC: 2.4 MG/DL (ref 1.6–2.6)
MCH RBC QN AUTO: 30.2 PG (ref 27–31)
MCHC RBC AUTO-ENTMCNC: 31.1 G/DL (ref 33–36)
MCV RBC AUTO: 97 FL (ref 80–94)
MONOCYTES # BLD AUTO: 1.38 X10(3)/MCL (ref 0.1–1.3)
MONOCYTES NFR BLD AUTO: 14.9 %
NEUTROPHILS # BLD AUTO: 6.61 X10(3)/MCL (ref 2.1–9.2)
NEUTROPHILS NFR BLD AUTO: 71.5 %
NRBC BLD AUTO-RTO: 0 %
PHOSPHATE SERPL-MCNC: 3.4 MG/DL (ref 2.3–4.7)
PLATELET # BLD AUTO: 379 X10(3)/MCL (ref 130–400)
PMV BLD AUTO: 9.8 FL (ref 7.4–10.4)
POTASSIUM SERPL-SCNC: 4.8 MMOL/L (ref 3.5–5.1)
PROT SERPL-MCNC: 6.7 GM/DL (ref 5.8–7.6)
PTH-INTACT SERPL-MCNC: 26.2 PG/ML (ref 8.7–77)
RBC # BLD AUTO: 4.7 X10(6)/MCL (ref 4.7–6.1)
SODIUM SERPL-SCNC: 140 MMOL/L (ref 136–145)
WBC # SPEC AUTO: 9.25 X10(3)/MCL (ref 4.5–11.5)

## 2024-04-11 PROCEDURE — 83735 ASSAY OF MAGNESIUM: CPT

## 2024-04-11 PROCEDURE — 85025 COMPLETE CBC W/AUTO DIFF WBC: CPT

## 2024-04-11 PROCEDURE — 80053 COMPREHEN METABOLIC PANEL: CPT

## 2024-04-11 PROCEDURE — 84100 ASSAY OF PHOSPHORUS: CPT

## 2024-04-11 PROCEDURE — 82306 VITAMIN D 25 HYDROXY: CPT | Mod: GA

## 2024-04-11 PROCEDURE — 83970 ASSAY OF PARATHORMONE: CPT

## 2024-04-11 PROCEDURE — 36415 COLL VENOUS BLD VENIPUNCTURE: CPT

## 2024-04-13 DIAGNOSIS — I50.32 CHF (CONGESTIVE HEART FAILURE), NYHA CLASS I, CHRONIC, DIASTOLIC: ICD-10-CM

## 2024-04-13 DIAGNOSIS — Z79.01 ANTICOAGULANT LONG-TERM USE: ICD-10-CM

## 2024-04-15 RX ORDER — WARFARIN 2.5 MG/1
TABLET ORAL
Qty: 135 TABLET | Refills: 3 | Status: SHIPPED | OUTPATIENT
Start: 2024-04-15

## 2024-04-18 ENCOUNTER — ANTI-COAG VISIT (OUTPATIENT)
Dept: CARDIOLOGY | Facility: HOSPITAL | Age: 69
End: 2024-04-18
Payer: MEDICARE

## 2024-04-18 DIAGNOSIS — Z95.2 H/O MECHANICAL AORTIC VALVE REPLACEMENT: Primary | ICD-10-CM

## 2024-04-18 DIAGNOSIS — Z79.01 LONG TERM (CURRENT) USE OF ANTICOAGULANTS: ICD-10-CM

## 2024-04-18 LAB
CTP QC/QA: YES
INR PPP: 2.6 (ref 2–3)
PROTHROMBIN TIME, POC: 31.6 (ref 2–3)

## 2024-04-18 NOTE — TELEPHONE ENCOUNTER
How do you want to handle this?   Will continue to monitor.  Discussed lifestyle changes.  Will follow-up in 6 weeks for nurse visit.

## 2024-05-16 ENCOUNTER — ANTI-COAG VISIT (OUTPATIENT)
Dept: CARDIOLOGY | Facility: HOSPITAL | Age: 69
End: 2024-05-16
Payer: MEDICARE

## 2024-05-16 DIAGNOSIS — Z79.01 LONG TERM (CURRENT) USE OF ANTICOAGULANTS: ICD-10-CM

## 2024-05-16 DIAGNOSIS — Z95.2 H/O MECHANICAL AORTIC VALVE REPLACEMENT: Primary | ICD-10-CM

## 2024-05-16 LAB
CTP QC/QA: YES
INR PPP: 3.8 (ref 2–3)
PROTHROMBIN TIME, POC: ABNORMAL (ref 2–3)

## 2024-05-16 PROCEDURE — 99211 OFF/OP EST MAY X REQ PHY/QHP: CPT

## 2024-05-29 DIAGNOSIS — N18.32 STAGE 3B CHRONIC KIDNEY DISEASE: Primary | Chronic | ICD-10-CM

## 2024-05-29 RX ORDER — HYDROCORTISONE ACETATE 25 MG/1
SUPPOSITORY RECTAL
Qty: 20 SUPPOSITORY | Refills: 0 | Status: SHIPPED | OUTPATIENT
Start: 2024-05-29

## 2024-05-30 ENCOUNTER — ANTI-COAG VISIT (OUTPATIENT)
Dept: CARDIOLOGY | Facility: HOSPITAL | Age: 69
End: 2024-05-30
Payer: MEDICARE

## 2024-05-30 DIAGNOSIS — Z79.01 LONG TERM (CURRENT) USE OF ANTICOAGULANTS: ICD-10-CM

## 2024-05-30 DIAGNOSIS — Z95.2 H/O MECHANICAL AORTIC VALVE REPLACEMENT: Primary | ICD-10-CM

## 2024-05-30 LAB
CTP QC/QA: YES
INR PPP: 5 (ref 2–3)
PROTHROMBIN TIME, POC: ABNORMAL (ref 2–3)

## 2024-05-30 NOTE — PROGRESS NOTES
POC pt/inr performed.  Pt will hold warfarin daily for 2 days, then resume with dosage decrease.  He remains on farxiga and afrin.

## 2024-06-10 ENCOUNTER — ANTI-COAG VISIT (OUTPATIENT)
Dept: CARDIOLOGY | Facility: HOSPITAL | Age: 69
End: 2024-06-10
Payer: MEDICARE

## 2024-06-10 DIAGNOSIS — Z95.2 H/O MECHANICAL AORTIC VALVE REPLACEMENT: Primary | ICD-10-CM

## 2024-06-10 DIAGNOSIS — Z79.01 LONG TERM (CURRENT) USE OF ANTICOAGULANTS: ICD-10-CM

## 2024-06-10 LAB
CTP QC/QA: YES
INR PPP: 1.9 (ref 2–3)
PROTHROMBIN TIME, POC: ABNORMAL (ref 2–3)

## 2024-06-10 PROCEDURE — 99211 OFF/OP EST MAY X REQ PHY/QHP: CPT

## 2024-06-17 ENCOUNTER — ANTI-COAG VISIT (OUTPATIENT)
Dept: CARDIOLOGY | Facility: HOSPITAL | Age: 69
End: 2024-06-17
Payer: MEDICARE

## 2024-06-17 DIAGNOSIS — Z95.2 H/O MECHANICAL AORTIC VALVE REPLACEMENT: Primary | ICD-10-CM

## 2024-06-17 DIAGNOSIS — Z79.01 LONG TERM (CURRENT) USE OF ANTICOAGULANTS: ICD-10-CM

## 2024-06-17 LAB
CTP QC/QA: YES
INR PPP: 2.1 (ref 2–3)
PROTHROMBIN TIME, POC: NORMAL (ref 2–3)

## 2024-07-01 ENCOUNTER — PATIENT OUTREACH (OUTPATIENT)
Facility: CLINIC | Age: 69
End: 2024-07-01
Payer: MEDICARE

## 2024-07-01 NOTE — PROGRESS NOTES
Population Health Outreach. Health Maintenance Topic(s) Outreach Outcomes & Actions Taken:    Colorectal Cancer Screening - Outreach Outcomes & Actions Taken  : Pt Declined Completing Colorectal Cancer Screening         Additional Notes:  Cory Vegas MD  04/01/2024 Assessment; positive Cologuard and grade 4 hemorrhoidal disease with intermittent rectal bleeding no further anemia at this time on his current iron supplementation.Plan; Long discussion was options and he has decided to forego any workup of the positive Cologuard at this time. He will return if he has any questions or decides otherwise.    08/01/24 Wellness appointment

## 2024-07-03 ENCOUNTER — ANTI-COAG VISIT (OUTPATIENT)
Dept: CARDIOLOGY | Facility: HOSPITAL | Age: 69
End: 2024-07-03
Payer: MEDICARE

## 2024-07-03 DIAGNOSIS — Z95.2 H/O MECHANICAL AORTIC VALVE REPLACEMENT: Primary | ICD-10-CM

## 2024-07-03 DIAGNOSIS — Z79.01 LONG TERM (CURRENT) USE OF ANTICOAGULANTS: ICD-10-CM

## 2024-07-03 LAB
CTP QC/QA: YES
INR PPP: 2.1 (ref 2–3)
PROTHROMBIN TIME, POC: NORMAL (ref 2–3)

## 2024-07-03 PROCEDURE — 99211 OFF/OP EST MAY X REQ PHY/QHP: CPT

## 2024-07-03 NOTE — PROGRESS NOTES
POC pt/inr performed.  Pt will take 5 mg of warfarin today, then will resume with dosage increase.

## 2024-07-16 ENCOUNTER — ANTI-COAG VISIT (OUTPATIENT)
Dept: CARDIOLOGY | Facility: HOSPITAL | Age: 69
End: 2024-07-16
Payer: MEDICARE

## 2024-07-16 DIAGNOSIS — Z79.01 LONG TERM (CURRENT) USE OF ANTICOAGULANTS: ICD-10-CM

## 2024-07-16 DIAGNOSIS — Z95.2 H/O MECHANICAL AORTIC VALVE REPLACEMENT: Primary | ICD-10-CM

## 2024-07-16 LAB
CTP QC/QA: YES
INR PPP: 3.8 (ref 2–3)
PROTHROMBIN TIME, POC: ABNORMAL (ref 2–3)

## 2024-07-30 ENCOUNTER — ANTI-COAG VISIT (OUTPATIENT)
Dept: CARDIOLOGY | Facility: HOSPITAL | Age: 69
End: 2024-07-30
Payer: MEDICARE

## 2024-07-30 DIAGNOSIS — Z95.2 H/O MECHANICAL AORTIC VALVE REPLACEMENT: Primary | ICD-10-CM

## 2024-07-30 DIAGNOSIS — Z79.01 LONG TERM (CURRENT) USE OF ANTICOAGULANTS: ICD-10-CM

## 2024-07-30 LAB
CTP QC/QA: YES
INR PPP: 4 (ref 2–3)
PROTHROMBIN TIME, POC: ABNORMAL (ref 2–3)

## 2024-07-31 PROBLEM — Z00.00 ENCOUNTER FOR MEDICARE ANNUAL WELLNESS EXAM: Status: ACTIVE | Noted: 2024-07-31

## 2024-08-01 ENCOUNTER — OFFICE VISIT (OUTPATIENT)
Dept: INTERNAL MEDICINE | Facility: CLINIC | Age: 69
End: 2024-08-01
Payer: MEDICARE

## 2024-08-01 VITALS
HEIGHT: 72 IN | SYSTOLIC BLOOD PRESSURE: 122 MMHG | WEIGHT: 191 LBS | RESPIRATION RATE: 18 BRPM | OXYGEN SATURATION: 95 % | HEART RATE: 80 BPM | DIASTOLIC BLOOD PRESSURE: 64 MMHG | BODY MASS INDEX: 25.87 KG/M2

## 2024-08-01 DIAGNOSIS — R94.5 ABNORMAL LIVER FUNCTION: ICD-10-CM

## 2024-08-01 DIAGNOSIS — E03.9 ACQUIRED HYPOTHYROIDISM: Chronic | ICD-10-CM

## 2024-08-01 DIAGNOSIS — Z00.00 ENCOUNTER FOR MEDICARE ANNUAL WELLNESS EXAM: Primary | ICD-10-CM

## 2024-08-01 DIAGNOSIS — N18.32 STAGE 3B CHRONIC KIDNEY DISEASE: Chronic | ICD-10-CM

## 2024-08-01 DIAGNOSIS — E11.9 TYPE 2 DIABETES MELLITUS WITHOUT COMPLICATION, WITHOUT LONG-TERM CURRENT USE OF INSULIN: Chronic | ICD-10-CM

## 2024-08-01 DIAGNOSIS — E78.2 MIXED HYPERLIPIDEMIA: Chronic | ICD-10-CM

## 2024-08-01 DIAGNOSIS — I48.0 PAROXYSMAL ATRIAL FIBRILLATION: Chronic | ICD-10-CM

## 2024-08-01 RX ORDER — FUROSEMIDE 40 MG/1
40 TABLET ORAL
COMMUNITY

## 2024-08-01 RX ORDER — METOPROLOL SUCCINATE 25 MG/1
25 TABLET, EXTENDED RELEASE ORAL DAILY
COMMUNITY

## 2024-08-01 RX ORDER — ASCORBIC ACID 250 MG
250 TABLET ORAL 2 TIMES DAILY
COMMUNITY

## 2024-08-01 RX ORDER — CLONIDINE HYDROCHLORIDE 0.1 MG/1
0.1 TABLET ORAL
COMMUNITY
Start: 2024-04-25

## 2024-08-01 RX ORDER — VIT C/E/ZN/COPPR/LUTEIN/ZEAXAN 250MG-90MG
1000 CAPSULE ORAL DAILY
COMMUNITY

## 2024-08-01 NOTE — PROGRESS NOTES
Cory Watts MD        PATIENT NAME: Bertin Alonzo  : 1955  DATE: 24  MRN: [id number]      Patient Care Team:  Cory Watts MD as PCP - General (Internal Medicine)  Fahad Kaminski MD as Consulting Physician (Urology)  Steven Lobato MD as Consulting Physician (Ophthalmology)  Te Milan MD as Consulting Physician (Cardiology)  Floridalma Lovett LPN as Care Coordinator  Cory Vegas MD as Consulting Physician (Colon and Rectal Surgery)       Billing Provider: Cory Watts MD  Level of Service: [place] MED[place] ANNUAL WELLNESS SUBSEQUENT VISIT  Patient PCP Information       Provider PCP Type    Cory Watts MD General            Reason for Visit / Chief Complaint: Medicare AWV (Ankle swelling, nephrology took him off diuretics )       Update PCP  Update Chief Complaint         History of Present Illness / Problem Focused Workflow     Bertin Alonzo presents to the clinic with Medicare AWV (Ankle swelling, nephrology took him off diuretics )     Bertin is here for his annual wellness exam   Overall he is stable   His nephrologist stopped his diuretic in his kidney function is improving        Review of Systems   Review of Systems   Constitutional: Negative.    HENT: Negative.     Eyes: Negative.    Respiratory: Negative.     Cardiovascular: Negative.    Gastrointestinal: Negative.    Endocrine: Negative.    Genitourinary: Negative.    Musculoskeletal: Negative.    Integumentary:  Negative.   Neurological: Negative.    Psychiatric/Behavioral: Negative.          Patient Reported Health Risk Assessment       Medical / Social / Family History     Past Medical History:   Diagnosis Date    CAD (coronary artery disease)     CHF (congestive heart failure), NYHA class I, chronic, diastolic     Hypothyroidism, unspecified     Paroxysmal atrial fibrillation     S/P aortic valve replacement and aortoplasty     S/P CABG (coronary artery bypass graft)     Type  2 diabetes mellitus without complication, without long-term current use of insulin        Past Surgical History:   Procedure Laterality Date    AORTIC VALUE      AORTIC VALVE REPLACEMENT      w aortoplasty    APPENDECTOMY      BYPASS OF MESENTERIC ARTERY      CARDIAC VALVE REPLACEMENT  February 6, 2006    CATARACT EXTRACTION, BILATERAL      CORONARY ARTERY BYPASS GRAFT  2006, 2021    EYE SURGERY      Cataracts    PLEURODESIS USING TALC      SPLENECTOMY, TOTAL      STENTS      CORONARY    THYROID SURGERY         Social History  Mr. Alonzo  reports that he has quit smoking. His smoking use included cigarettes and cigars. He has never used smokeless tobacco. He reports that he does not currently use alcohol after a past usage of about 1.0 standard drink of alcohol per week. He reports that he does not use drugs.    Family History  Mr.'s Alonzo  family history includes Cancer in his father and mother; Hodgkin's lymphoma in his mother.        Medications and Allergies     Medications  Outpatient Medications Marked as Taking for the 8/1/24 encounter (Office Visit) with Cory Villanueva MD   Medication Sig Dispense Refill    amiodarone (PACERONE) 200 MG Tab Take 200 mg by mouth once daily.      ascorbic acid, vitamin C, (VITAMIN C) 250 MG tablet Take 250 mg by mouth 2 (two) times daily.      cholecalciferol, vitamin D3, (VITAMIN D3) 25 mcg (1,000 unit) capsule Take 1,000 Units by mouth once daily.      cloNIDine (CATAPRES) 0.1 MG tablet Take 0.1 mg by mouth as needed.      clopidogreL (PLAVIX) 75 mg tablet Take 75 mg by mouth once daily.      FARXIGA 10 mg tablet TAKE 1 TABLET BY MOUTH 1 TIME EACH DAY      ferrous sulfate, dried (IRON) 159 mg (45 mg iron) TbSR       furosemide (LASIX) 40 MG tablet Take 40 mg by mouth as needed.      levothyroxine (SYNTHROID) 112 MCG tablet TAKE 1 TABLET(112 MCG) BY MOUTH BEFORE BREAKFAST 30 tablet 11    metoprolol succinate (TOPROL-XL) 25 MG 24 hr tablet Take 25 mg by mouth once  daily.      nitroGLYCERIN (NITROSTAT) 0.4 MG SL tablet Place 0.4 mg under the tongue daily as needed.      potassium chloride SA (K-DUR,KLOR-CON M) 10 MEQ tablet TAKE 1 TABLET BY MOUTH TWICE DAILY 180 tablet 3    rosuvastatin (CRESTOR) 40 MG Tab Take 40 mg by mouth nightly.      warfarin (COUMADIN) 2.5 MG tablet TAKE 1 TABLET BY MOUTH EVERY TUESDAY, THURSDAY, SATURDAY, , 5 MG ON MONDAY, WEDNESDAY, FRIDAY (Patient taking differently: 5mg on Wednesday and 2.5mg all other days) 135 tablet 3       Allergies  Review of patient's allergies indicates:   Allergen Reactions    Digoxin      Other reaction(s): Toxicity  Other reaction(s): Toxicity  Other reaction(s): Toxicity       Physical Examination     Vitals:    24 1025   BP: 122/64   Pulse: 80   Resp: 18     Physical Exam  Vitals reviewed.   Constitutional:       Appearance: Normal appearance.   HENT:      Head: Normocephalic.      Right Ear: Tympanic membrane normal.      Left Ear: Tympanic membrane normal.      Nose: Nose normal.      Mouth/Throat:      Pharynx: Oropharynx is clear.   Eyes:      Extraocular Movements: Extraocular movements intact.      Pupils: Pupils are equal, round, and reactive to light.   Cardiovascular:      Rate and Rhythm: Normal rate and regular rhythm.      Pulses: Normal pulses.      Heart sounds: Normal heart sounds.   Pulmonary:      Effort: Pulmonary effort is normal.      Breath sounds: Normal breath sounds.   Abdominal:      General: Abdomen is flat. Bowel sounds are normal.      Palpations: Abdomen is soft.   Musculoskeletal:         General: Normal range of motion.      Cervical back: Normal range of motion.      Right lower le+ Edema present.      Left lower le+ Edema present.   Skin:     General: Skin is warm and dry.   Neurological:      General: No focal deficit present.      Mental Status: He is alert and oriented to person, place, and time.   Psychiatric:         Mood and Affect: Mood normal.         Behavior:  Behavior normal.               No data to display                  4/22/2024     1:40 PM 2/1/2024     9:40 AM 7/18/2023     9:00 AM 7/10/2023    10:20 AM 10/19/2022     1:00 PM 7/12/2022    10:40 AM   Fall Risk Assessment - Outpatient   Mobility Status Ambulatory Ambulatory Ambulatory Ambulatory Ambulatory Ambulatory   Number of falls 0 0 0 1 1 1   Identified as fall risk False False False False False False                Assessment and Plan (including Health Maintenance)      Problem List  Smart Sets  Document Outside HM   :    Plan:       ICD-10-CM ICD-9-CM   1. Encounter for Medicare annual wellness exam  Z00.00 V70.0   2. Paroxysmal atrial fibrillation  I48.0 427.31   3. Type 2 diabetes mellitus without complication, without long-term current use of insulin  E11.9 250.00   4. Stage 3b chronic kidney disease  N18.32 585.3   5. Acquired hypothyroidism  E03.9 244.9   6. Mixed hyperlipidemia  E78.2 272.2   7. Abnormal liver function  R94.5 794.8               Health Maintenance Due   Topic Date Due    Hepatitis C Screening  Never done    Foot Exam  Never done    TETANUS VACCINE  Never done    RSV Vaccine (Age 60+ and Pregnant patients) (1 - 1-dose 60+ series) Never done    Abdominal Aortic Aneurysm Screening  Never done    Pneumococcal Vaccines (Age 65+) (3 of 3 - PPSV23 or PCV20) 12/04/2020    Shingles Vaccine (3 of 3) 06/06/2022    COVID-19 Vaccine (7 - 2023-24 season) 09/01/2023       Problem List Items Addressed This Visit          Cardiac/Vascular    Paroxysmal atrial fibrillation (Chronic)    Current Assessment & Plan     Sinus rhythm continue treatment         Hyperlipidemia (Chronic)    Current Assessment & Plan     Stable continue treatment            Renal/    Stage 3b chronic kidney disease (Chronic)    Current Assessment & Plan     Improvement in kidney function over the last 3 months            Endocrine    Hypothyroidism, unspecified (Chronic)    Current Assessment & Plan     Stable continue  treatment         Type 2 diabetes mellitus without complication, without long-term current use of insulin (Chronic)    Current Assessment & Plan     A1c 5.7   Much improved over last office visit   Revisit six-month.            GI    Abnormal liver function    Current Assessment & Plan     He has a remote history of fatty liver   Abdominal ultrasound scheduled.            Other    Encounter for Medicare annual wellness exam - Primary    Current Assessment & Plan     Discussed results his laboratory examination.            Health Maintenance Topics with due status: Not Due       Topic Last Completion Date    Influenza Vaccine 10/19/2022    Colorectal Cancer Screening 02/20/2024    Eye Exam 03/11/2024    Lipid Panel 07/30/2024    Hemoglobin A1c 07/30/2024       Future Appointments   Date Time Provider Department Center   8/13/2024 11:40 AM [facility], [facility]   4/22/2025 11:40 AM VINCENT Peraza MD [facility]          Medicare Annual Wellness and Personalized Prevention Plan:   Fall Risk + Home Safety + Hearing Impairment + Depression Screen + Cognitive Impairment Screen + Health Risk Assessment all reviewed.         Advance Care Planning     Date: 08/01/2024  Patient did not wish or was not able to name a surrogate decision maker or provide an Advance Care Plan.           Opioid Screening: Patient medication list reviewed, patient is not taking prescription opioids. Patient is not using additional opioids than prescribed. Patient is at low risk of substance abuse based on this opioid use history.        Signature:  Cory Watts MD  [facility]  [address] [place] [phone]    Date of encounter: 8/1/24    Follow up in about 6 months (around 2/1/2025), or Josh, for DM Follow Up. In addition to their scheduled follow up, the patient has also been instructed to follow up on as needed basis.

## 2024-08-02 ENCOUNTER — TELEPHONE (OUTPATIENT)
Dept: INTERNAL MEDICINE | Facility: CLINIC | Age: 69
End: 2024-08-02
Payer: MEDICARE

## 2024-08-13 ENCOUNTER — ANTI-COAG VISIT (OUTPATIENT)
Dept: CARDIOLOGY | Facility: HOSPITAL | Age: 69
End: 2024-08-13
Payer: MEDICARE

## 2024-08-13 DIAGNOSIS — Z95.2 H/O MECHANICAL AORTIC VALVE REPLACEMENT: Primary | ICD-10-CM

## 2024-08-13 DIAGNOSIS — Z79.01 LONG TERM (CURRENT) USE OF ANTICOAGULANTS: ICD-10-CM

## 2024-08-13 LAB
CTP QC/QA: YES
INR PPP: 3.1 (ref 2–3)
PROTHROMBIN TIME, POC: ABNORMAL (ref 2–3)

## 2024-08-13 PROCEDURE — 99211 OFF/OP EST MAY X REQ PHY/QHP: CPT

## 2024-08-26 NOTE — PROGRESS NOTES
POC pt/inr performed.  No change to warfarin dosing.     Subjective:     Patient ID: Betzy Cooley is a 84 y.o. female.    Chief Complaint: Pain    Ms Cooley is an 85 yo female here for evaluation of low back pain and compression fracture which worsened  8/21/2024.  She had a fall a couple of months ago, and small compression fracture.  She does not back pain.  The back brace is uncomfortable.  There is no leg pain, no weakness and no numbness and tingling in the legs.  She has HH yesterday.  She is using a rolling walker.  She does have to sit and rest more SOB.  She has been wearing back brace.  She has not been sleeping with it and she has been showering.  The pain is 0/10.  She use to take fosamax but stopped it because risk of cancer.        MRI lumbar 8/2024  Alignment: Grade 1 anterolisthesis L4 on L5.  Retropulsion of L1 vertebral body.     Vertebrae: Compression fracture of the L1 vertebral body with marked height loss and 6 mm retropulsion.  Essentially complete height loss of the ventral and central aspect of the vertebral body.  Patchy edema-like signal within the L1 vertebra would in keeping with recent fracture.  No large paraspinous hematoma.  No definite intraspinal hemorrhage.  Elsewhere in the lumbar spine, centrally normal marrow signal for age; no endplate edema-like signal.     Discs: Disc desiccation and height loss at L2-L3 and L3-L4.     Cord: Conus terminates at L1-L2 and appears unremarkable.  Cauda equina appears unremarkable.     Degenerative findings:     *T12-L1: Compression fracture of the L1 vertebral body with retropulsion resulting in no more than moderate bilateral neural foraminal narrowing and moderate spinal canal stenosis.  *L1-L2: Compression fracture of the L1 vertebral body with retropulsion resulting in moderate bilateral neural foraminal narrowing and mild spinal canal stenosis.  Minimal AP thecal sac diameter approximately 6.9 mm at level of maximum compression.  *L2-L3: Facet arthropathy and circumferential disc bulge  resulting in mild bilateral neural foraminal narrowing and mild spinal canal stenosis.  *L3-L4: Facet arthropathy and ligamentum flavum buckling resulting in moderate bilateral neural foraminal narrowing and mild spinal canal stenosis.  *L4-L5: Grade 1 anterolisthesis, facet arthropathy and ligamentum flavum buckling with unroofing of the intervertebral disc resulting in severe bilateral subarticular recess stenosis (with encroachment and possible contact of traversing L5 nerve roots), mild left neural foraminal narrowing and moderate spinal canal stenosis.  *L5-S1: Facet arthropathy resulting in mild bilateral neural foraminal narrowing.  No spinal canal stenosis.  Paraspinal muscles & soft tissues: Unremarkable.     Impression:     L1 vertebral body compression fracture resulting in vertebra plana configuration with 6 mm retropulsion.  Finding results in no more than moderate spinal canal stenosis at T12-L1 and moderate bilateral neural foraminal narrowing at L1-L2.     Multilevel lumbar spondylosis, as characterized above, notable for moderate bilateral neural foraminal narrowing at L3-L4, severe bilateral subarticular recess stenosis at L4-L5 and moderate spinal canal stenosis at L4-L5.    X-ray lumbar 8/2024  severe compression deformity with marked loss of height involving the L1 vertebral body is again observed, unchanged from the 08/21/2024 CT examination but representing a significant interval loss of height of L1 since the older conventional radiograph of 05/29/2024.  There is a modest degree of retropulsion of the posterior aspect of this markedly compressed L1 vertebral body noted, as was optimally demonstrated on the prior CT and MR examinations referenced above.  9 mm of anterolisthesis of L4 in relation to L5 is appreciated, as is a lesser degree of anterolisthesis of L5 in relation to S1, secondary to facet arthropathy at these levels.  Alignment elsewhere in the lumbar and visualized lower thoracic  spine appears unremarkable.  Aside from L1, the visualized vertebral body heights are normally maintained without compression deformity.  Disc narrowing is seen at every level from L1-2 through L4-5.  Vertebral endplates appear well defined.  No osseous destructive process.     Impression:     1. Compression deformity with severe loss of height of the L1 vertebral body with a modest degree of retropulsion of the posterior aspect of this compressed vertebral body.  The appearance is similar to the recent CT and MR examinations, although it does represent a detrimental change since of earlier conventional radiograph of 05/29/2024.  2. Anterolisthesis of L4 in relation to L5 and L5 in relation to S1, secondary to facet arthropathy at each level.  3. No alignment change between the supine and weight-bearing images is observed.        Past Medical History:  No date: Arthritis  No date: Cataract  No date: GERD (gastroesophageal reflux disease)  No date: HEARING LOSS  No date: Hypertension  No date: Osteoporosis, postmenopausal  2007: Squamous Cell Carcinoma      Comment:  right forearm  No date: Urinary incontinence      Comment:  rare mixed    Past Surgical History:  5/17/2024: ANGIOGRAM, CORONARY, WITH LEFT HEART CATHETERIZATION;   Bilateral      Comment:  Procedure: Angiogram, Coronary, with Left Heart Cath;                 Surgeon: Miguel Jacob MD;  Location: [facility] CATH                LAB;  Service: Cardiology;  Laterality: Bilateral;  08/06/2019: COLPOPEXY; N/A      Comment:  Procedure: COLPOPEXY SSL FIXATION;  Surgeon: Alma Dorsey MD;  Location: [facility] OR [address] [place] [address];  Service:                Urology;  Laterality: N/A;  5/16/2024: CORONARY ANGIOGRAPHY; Left      Comment:  Procedure: ANGIOGRAM, CORONARY ARTERY;  Surgeon: Miguel Jacob MD;  Location: [facility] CATH LAB;  Service:                Cardiology;  Laterality: Left;  08/06/2019: CYSTOSCOPY; N/A      Comment:  Procedure:  CYSTOSCOPY;  Surgeon: Alma Dorsey MD;                 Location: [address];  Service: Urology;                 Laterality: N/A;  4/1/2024: ESOPHAGOGASTRODUODENOSCOPY; N/A      Comment:  Procedure: EGD (ESOPHAGOGASTRODUODENOSCOPY);  Surgeon:                Zbigniew Dougherty MD;  Location: [facility];                 Service: Endoscopy;  Laterality: N/A;  2008: FRACTURE SURGERY; Left      Comment:  open radius/ulna fracture  No date: HYSTERECTOMY      Comment:  TANIA/ovaries remain--fibroids- in her late 30's / early                40's  5/17/2024: IVUS, CORONARY      Comment:  Procedure: IVUS, Coronary;  Surgeon: Miguel Jacob MD;  Location: [facility] CATH LAB;  Service: Cardiology;;  5/17/2024: STENT, DRUG ELUTING, MULTI VESSEL, CORONARY      Comment:  Procedure: Stent, Drug Eluting, Multi Vessel, Coronary;                Surgeon: Miguel Jacob MD;  Location: [facility] CATH                LAB;  Service: Cardiology;;    Review of patient's family history indicates:  Problem: Heart failure      Relation: Mother          Name: copd              Age of Onset: (Not Specified)  Problem: Macular degeneration      Relation: Mother          Name: copd              Age of Onset: (Not Specified)  Problem: Heart disease      Relation: Mother          Name: copd              Age of Onset: (Not Specified)  Problem: COPD      Relation: Mother          Name: copd              Age of Onset: (Not Specified)  Problem: Diabetes      Relation: Sister          Name:               Age of Onset: (Not Specified)  Problem: Arthritis      Relation: Sister          Name:               Age of Onset: (Not Specified)              Comment: rheumatoid arthritis  Problem: COPD      Relation: Father          Name:               Age of Onset: (Not Specified)  Problem: No Known Problems      Relation: Brother          Name:               Age of Onset: (Not Specified)  Problem: No Known Problems      Relation: Son           Name: Saad              Age of Onset: (Not Specified)  Problem: No Known Problems      Relation: Daughter          Name: Jordyn              Age of Onset: (Not Specified)  Problem: No Known Problems      Relation: Sister          Name:               Age of Onset: (Not Specified)  Problem: No Known Problems      Relation: Sister          Name:               Age of Onset: (Not Specified)  Problem: No Known Problems      Relation: Sister          Name:               Age of Onset: (Not Specified)  Problem: No Known Problems      Relation: Brother          Name:               Age of Onset: (Not Specified)  Problem: No Known Problems      Relation: Brother          Name:               Age of Onset: (Not Specified)  Problem: No Known Problems      Relation: Brother          Name:               Age of Onset: (Not Specified)  Problem: No Known Problems      Relation: Brother          Name:               Age of Onset: (Not Specified)  Problem: No Known Problems      Relation: Brother          Name:               Age of Onset: (Not Specified)  Problem: No Known Problems      Relation: Daughter          Name: Reyes              Age of Onset: (Not Specified)  Problem: No Known Problems      Relation: Son          Name: Arie              Age of Onset: (Not Specified)  Problem: Breast cancer      Relation: Neg Hx          Name:               Age of Onset: (Not Specified)  Problem: Ovarian cancer      Relation: Neg Hx          Name:               Age of Onset: (Not Specified)  Problem: Amblyopia      Relation: Neg Hx          Name:               Age of Onset: (Not Specified)  Problem: Blindness      Relation: Neg Hx          Name:               Age of Onset: (Not Specified)  Problem: Cancer      Relation: Neg Hx          Name:               Age of Onset: (Not Specified)  Problem: Cataracts      Relation: Neg Hx          Name:               Age of Onset: (Not Specified)  Problem: Glaucoma      Relation: Neg Hx          Name:                Age of Onset: (Not Specified)  Problem: Hypertension      Relation: Neg Hx          Name:               Age of Onset: (Not Specified)  Problem: Retinal detachment      Relation: Neg Hx          Name:               Age of Onset: (Not Specified)  Problem: Strabismus      Relation: Neg Hx          Name:               Age of Onset: (Not Specified)  Problem: Stroke      Relation: Neg Hx          Name:               Age of Onset: (Not Specified)  Problem: Thyroid disease      Relation: Neg Hx          Name:               Age of Onset: (Not Specified)  Problem: Cervical cancer      Relation: Neg Hx          Name:               Age of Onset: (Not Specified)  Problem: Endometrial cancer      Relation: Neg Hx          Name:               Age of Onset: (Not Specified)  Problem: Vaginal cancer      Relation: Neg Hx          Name:               Age of Onset: (Not Specified)  Problem: Colon cancer      Relation: Neg Hx          Name:               Age of Onset: (Not Specified)      Social History    Socioeconomic History      Marital status:     Tobacco Use      Smoking status: Never      Smokeless tobacco: Never    Substance and Sexual Activity      Alcohol use: Not Currently        Comment: 1-2 glasses wine weekly      Drug use: No      Sexual activity: Yes        Partners: Male        Birth control/protection: None    Social Determinants of Health  Financial Resource Strain: Low Risk  (5/30/2024)      Overall Financial Resource Strain (CARDIA)          Difficulty of Paying Living Expenses: Not hard at all  Food Insecurity: No Food Insecurity (5/6/2024)      Hunger Vital Sign          Worried About Running Out of Food in the Last Year: Never true          Ran Out of Food in the Last Year: Never true  Transportation Needs: No Transportation Needs (5/30/2024)      PRAPARE - Transportation          Lack of Transportation (Medical): No          Lack of Transportation (Non-Medical): No  Physical Activity:  Sufficiently Active (5/6/2024)      Exercise Vital Sign          Days of Exercise per Week: 3 days          Minutes of Exercise per Session: 90 min  Stress: Stress Concern Present (5/30/2024)      [employer] - Occupational Stress Questionnaire          Feeling of Stress : To some extent  Housing Stability: Low Risk  (5/6/2024)      Housing Stability Vital Sign          Unable to Pay for Housing in the Last Year: No          Homeless in the Last Year: No    Current Outpatient Medications:  aspirin 81 MG Chew, Take 81 mg by mouth once daily. Not chewable, Disp: , Rfl:   atorvastatin (LIPITOR) 40 MG tablet, Take 1 tablet (40 mg total) by mouth every evening., Disp: 360 tablet, Rfl: 0  calcium-vitamin D3 500 mg(1,250mg) -200 unit per tablet, Take 1 tablet by mouth 2 (two) times daily with meals., Disp: , Rfl:   co-enzyme Q-10 30 mg capsule, Take 30 mg by mouth once daily., Disp: , Rfl:    diclofenac sodium (VOLTAREN ARTHRITIS PAIN) 1 % Gel, Apply 2 g topically daily as needed (Pain)., Disp: , Rfl:   ezetimibe (ZETIA) 10 mg tablet, Take 1 tablet (10 mg total) by mouth once daily., Disp: 90 tablet, Rfl: 3  hydrOXYzine HCL (ATARAX) 25 MG tablet, Take 1 tablet (25 mg total) by mouth 3 (three) times daily as needed for Anxiety., Disp: 30 tablet, Rfl: 3  lanolin/mineral oil/petrolatum (ARTIFICIAL TEARS OPHT), Place 1 drop into both eyes once daily., Disp: , Rfl:   metoprolol succinate (TOPROL-XL) 25 MG 24 hr tablet, Take 0.5 tablets (12.5 mg total) by mouth once daily., Disp: 45 tablet, Rfl: 0  multivitamin (THERAGRAN) per tablet, Take 1 tablet by mouth once daily., Disp: , Rfl:   nitroGLYCERIN (NITROSTAT) 0.4 MG SL tablet, Place 1 tablet (0.4 mg total) under the tongue every 5 (five) minutes as needed for Chest pain., Disp: 30 tablet, Rfl: 2  pantoprazole (PROTONIX) 40 MG tablet, Take 1 tablet (40 mg total) by mouth 2 (two) times daily., Disp: 180 tablet, Rfl: 3  sodium chloride (SALINE MIST  NASL), 1 spray by Each Nostril route daily as needed (Congestion)., Disp: , Rfl:   ticagrelor (BRILINTA) 90 mg tablet, Take 1 tablet (90 mg total) by mouth 2 (two) times daily., Disp: 60 tablet, Rfl: 11  valsartan (DIOVAN) 80 MG tablet, Take 1 tablet (80 mg total) by mouth once daily., Disp: 90 tablet, Rfl: 3  vitamin A-vitamin C-vit E-min Tab, Take 1 tablet by mouth once daily., Disp: , Rfl:     No current facility-administered medications for this visit.  Facility-Administered Medications Ordered in Other Visits:  lactated ringers infusion, , Intravenous, Continuous, Zbigniew Dougherty MD        Review of patient's allergies indicates:   -- Sulfa (sulfonamide antibiotics) -- Hives    --  Other reaction(s): Anaphylaxis             Itching             Shortness of breath          Review of Systems   Constitutional: Negative for fever, night sweats, weight gain and weight loss.   Cardiovascular:  Positive for dyspnea on exertion. Negative for chest pain.   Respiratory:  Positive for shortness of breath.    Skin:  Negative for rash.   Musculoskeletal:  Negative for back pain and neck pain.   Gastrointestinal:  Negative for abdominal pain, bowel incontinence, diarrhea, nausea and vomiting.   Genitourinary:  Negative for bladder incontinence.   Neurological:  Negative for numbness and paresthesias.        Objective:     General: Betzy is well-developed, well-nourished, appears stated age, in no acute distress, alert and oriented to time, place and person.     General    Vitals reviewed.  Constitutional: She is oriented to person, place, and time. She appears well-developed and well-nourished.   HENT:   Head: Normocephalic and atraumatic.   Pulmonary/Chest: Effort normal.   Neurological: She is alert and oriented to person, place, and time.   Psychiatric: She has a normal mood and affect. Her behavior is normal. Judgment and thought content normal.     General Musculoskeletal Exam   Gait: normal (slow)     Back  (L-Spine & T-Spine) / Neck (C-Spine) Exam     Spinal Sensation   Right Side Sensation  C-Spine Level: normal   L-Spine Level: normal  S-Spine Level: normal  Left Side Sensation  C-Spine Level: normal  L-Spine Level: normal  S-Spine Level: normal    Back (L-Spine & T-Spine) Tests   Right Side Tests  Straight leg raise:        Sitting SLR: > 70 degrees    Left Side Tests  Straight leg raise:       Sitting SLR: > 70 degrees      Other   She has no scoliosis .  Spinal Kyphosis:  Absent      Muscle Strength   Right Upper Extremity   Biceps: 5/5   Deltoid:  5/5  Triceps:  5/5  Wrist extension: 5/5   Finger Flexors:  5/5  Left Upper Extremity  Biceps: 5/5   Deltoid:  5/5  Triceps:  5/5  Wrist extension: 5/5   Finger Flexors:  5/5  Right Lower Extremity   Hip Flexion: 5/5   Quadriceps:  5/5   Anterior tibial:  5/5   EHL:  5/5  Left Lower Extremity   Hip Flexion: 5/5   Quadriceps:  5/5   Anterior tibial:  5/5   EHL:  5/5    Reflexes     Left Side  Biceps:  2+  Triceps:  2+  Brachioradialis:  2+  Achilles:  2+  Left Monae's Sign:  Absent  Babinski Sign:  absent  Quadriceps:  2+    Right Side   Biceps:  2+  Triceps:  2+  Brachioradialis:  2+  Achilles:  2+  Right Monae's Sign:  absent  Babinski Sign:  absent  Quadriceps:  2+    Vascular Exam     Right Pulses        Carotid:                  2+    Left Pulses        Carotid:                  2+          Assessment:     1. Compression fracture of L1 vertebra, initial encounter         Plan:     Orders Placed This Encounter    Ambulatory referral/consult to Endocrinology    Ambulatory referral/consult to Pain Clinic    Ambulatory referral/consult to Neurosurgery      Compression fracture of L1 with some retropulsion initial fall was may with small fracture, imaging in August shows worsening fracture but did it worsen in may or June?  She currently does not have any back pain.  The anxiety and discomfort of the back brace is more of an issue.  She did have pain after fall in  may  Continue back brace when up and moving.  She is not using it in bed.  We discussed it does not have to be uncomfortably tight  Endocrine for osteoporosis eval  Neurosurgery to eval fracture stability  Pain to consider kypho.  We did discuss not having pain, so have to way the pros and cons of procedure.  There is significant anxiety with brace, but no signs of claudication or back pain currently.  We discussed the best thing might be to do nothing  RTC as needed.  She will follow with nsg and pain and can return if needed    More than 50% of the total time  of 45 minutes was spent face to face in counseling on diagnosis and treatment options. I also counseled patient  on common and most usual side effect of prescribed medications. Preparing to see the patient (eg, review of tests), Obtaining and/or reviewing separately obtained history, documenting clinical information in the electronic or other health record, independently interpreting results (not separately reported) and communicating results to the patient/family/caregiver, or care coordination (not separately reported). I reviewed Primary care , and other specialty's notes to better coordinate patient's care. All questions were answered, and patient voiced understanding.      Follow-up: No follow-ups on file. If there are any questions prior to this, the patient was instructed to contact the office.

## 2024-08-27 ENCOUNTER — LAB VISIT (OUTPATIENT)
Dept: LAB | Facility: HOSPITAL | Age: 69
End: 2024-08-27
Attending: INTERNAL MEDICINE
Payer: MEDICARE

## 2024-08-27 DIAGNOSIS — I12.9 PARENCHYMAL RENAL HYPERTENSION: ICD-10-CM

## 2024-08-27 DIAGNOSIS — N18.9 ANEMIA OF CHRONIC RENAL FAILURE, UNSPECIFIED CKD STAGE: ICD-10-CM

## 2024-08-27 DIAGNOSIS — N18.32 CHRONIC KIDNEY DISEASE (CKD) STAGE G3B/A1, MODERATELY DECREASED GLOMERULAR FILTRATION RATE (GFR) BETWEEN 30-44 ML/MIN/1.73 SQUARE METER AND ALBUMINURIA CREATININE RATIO LESS THAN 30 MG/G: Primary | ICD-10-CM

## 2024-08-27 DIAGNOSIS — N18.6 TYPE 2 DIABETES MELLITUS WITH END-STAGE RENAL DISEASE: ICD-10-CM

## 2024-08-27 DIAGNOSIS — E11.22 TYPE 2 DIABETES MELLITUS WITH END-STAGE RENAL DISEASE: ICD-10-CM

## 2024-08-27 DIAGNOSIS — D63.1 ANEMIA OF CHRONIC RENAL FAILURE, UNSPECIFIED CKD STAGE: ICD-10-CM

## 2024-08-27 DIAGNOSIS — R80.9 PROTEINURIA, UNSPECIFIED TYPE: ICD-10-CM

## 2024-08-27 LAB
25(OH)D3+25(OH)D2 SERPL-MCNC: 64 NG/ML (ref 30–80)
ALBUMIN SERPL-MCNC: 3.8 G/DL (ref 3.4–4.8)
ALBUMIN/GLOB SERPL: 1.3 RATIO (ref 1.1–2)
ALP SERPL-CCNC: 136 UNIT/L (ref 40–150)
ALT SERPL-CCNC: 63 UNIT/L (ref 0–55)
ANION GAP SERPL CALC-SCNC: 9 MEQ/L
AST SERPL-CCNC: 59 UNIT/L (ref 5–34)
BASOPHILS # BLD AUTO: 0.07 X10(3)/MCL
BASOPHILS NFR BLD AUTO: 0.9 %
BILIRUB SERPL-MCNC: 1.1 MG/DL
BUN SERPL-MCNC: 20.8 MG/DL (ref 8.4–25.7)
CALCIUM SERPL-MCNC: 9.1 MG/DL (ref 8.8–10)
CHLORIDE SERPL-SCNC: 107 MMOL/L (ref 98–107)
CO2 SERPL-SCNC: 24 MMOL/L (ref 23–31)
CREAT SERPL-MCNC: 1.84 MG/DL (ref 0.73–1.18)
CREAT/UREA NIT SERPL: 11
EOSINOPHIL # BLD AUTO: 0.09 X10(3)/MCL (ref 0–0.9)
EOSINOPHIL NFR BLD AUTO: 1.1 %
ERYTHROCYTE [DISTWIDTH] IN BLOOD BY AUTOMATED COUNT: 16.8 % (ref 11.5–17)
GFR SERPLBLD CREATININE-BSD FMLA CKD-EPI: 39 ML/MIN/1.73/M2
GLOBULIN SER-MCNC: 3 GM/DL (ref 2.4–3.5)
GLUCOSE SERPL-MCNC: 117 MG/DL (ref 82–115)
HCT VFR BLD AUTO: 46.6 % (ref 42–52)
HGB BLD-MCNC: 14.4 G/DL (ref 14–18)
IMM GRANULOCYTES # BLD AUTO: 0.03 X10(3)/MCL (ref 0–0.04)
IMM GRANULOCYTES NFR BLD AUTO: 0.4 %
LYMPHOCYTES # BLD AUTO: 0.5 X10(3)/MCL (ref 0.6–4.6)
LYMPHOCYTES NFR BLD AUTO: 6.2 %
MAGNESIUM SERPL-MCNC: 2.1 MG/DL (ref 1.6–2.6)
MCH RBC QN AUTO: 29.5 PG (ref 27–31)
MCHC RBC AUTO-ENTMCNC: 30.9 G/DL (ref 33–36)
MCV RBC AUTO: 95.5 FL (ref 80–94)
MONOCYTES # BLD AUTO: 0.95 X10(3)/MCL (ref 0.1–1.3)
MONOCYTES NFR BLD AUTO: 11.8 %
NEUTROPHILS # BLD AUTO: 6.4 X10(3)/MCL (ref 2.1–9.2)
NEUTROPHILS NFR BLD AUTO: 79.6 %
NRBC BLD AUTO-RTO: 0 %
PHOSPHATE SERPL-MCNC: 2.8 MG/DL (ref 2.3–4.7)
PLATELET # BLD AUTO: 266 X10(3)/MCL (ref 130–400)
PMV BLD AUTO: 10.1 FL (ref 7.4–10.4)
POTASSIUM SERPL-SCNC: 4.2 MMOL/L (ref 3.5–5.1)
PROT SERPL-MCNC: 6.8 GM/DL (ref 5.8–7.6)
PTH-INTACT SERPL-MCNC: 46.5 PG/ML (ref 8.7–77)
RBC # BLD AUTO: 4.88 X10(6)/MCL (ref 4.7–6.1)
SODIUM SERPL-SCNC: 140 MMOL/L (ref 136–145)
WBC # BLD AUTO: 8.04 X10(3)/MCL (ref 4.5–11.5)

## 2024-08-27 PROCEDURE — 36415 COLL VENOUS BLD VENIPUNCTURE: CPT

## 2024-08-27 PROCEDURE — 83970 ASSAY OF PARATHORMONE: CPT

## 2024-08-27 PROCEDURE — 85025 COMPLETE CBC W/AUTO DIFF WBC: CPT

## 2024-08-27 PROCEDURE — 82306 VITAMIN D 25 HYDROXY: CPT | Mod: GA

## 2024-08-27 PROCEDURE — 84100 ASSAY OF PHOSPHORUS: CPT

## 2024-08-27 PROCEDURE — 80053 COMPREHEN METABOLIC PANEL: CPT

## 2024-08-27 PROCEDURE — 83735 ASSAY OF MAGNESIUM: CPT

## 2024-09-03 ENCOUNTER — ANTI-COAG VISIT (OUTPATIENT)
Dept: CARDIOLOGY | Facility: HOSPITAL | Age: 69
End: 2024-09-03
Payer: MEDICARE

## 2024-09-03 DIAGNOSIS — Z95.2 H/O MECHANICAL AORTIC VALVE REPLACEMENT: Primary | ICD-10-CM

## 2024-09-03 DIAGNOSIS — Z79.01 LONG TERM (CURRENT) USE OF ANTICOAGULANTS: ICD-10-CM

## 2024-09-03 LAB
CTP QC/QA: YES
INR PPP: 3.6 (ref 2–3)
PROTHROMBIN TIME, POC: ABNORMAL (ref 2–3)

## 2024-09-03 PROCEDURE — 99211 OFF/OP EST MAY X REQ PHY/QHP: CPT

## 2024-09-03 NOTE — PROGRESS NOTES
POC pt/inr performed.  Pt instructed to continue warfarin dosing with no changes.  Pt will eat a 1/2 cup portion of greens today.

## 2024-09-30 ENCOUNTER — ANTI-COAG VISIT (OUTPATIENT)
Dept: CARDIOLOGY | Facility: HOSPITAL | Age: 69
End: 2024-09-30
Payer: MEDICARE

## 2024-09-30 DIAGNOSIS — Z95.2 H/O MECHANICAL AORTIC VALVE REPLACEMENT: Primary | ICD-10-CM

## 2024-09-30 DIAGNOSIS — Z79.01 LONG TERM (CURRENT) USE OF ANTICOAGULANTS: ICD-10-CM

## 2024-09-30 LAB
CTP QC/QA: YES
INR PPP: 4.3 (ref 2–3)
PROTHROMBIN TIME, POC: ABNORMAL (ref 2–3)

## 2024-10-08 DIAGNOSIS — N18.32 STAGE 3B CHRONIC KIDNEY DISEASE: Chronic | ICD-10-CM

## 2024-10-08 RX ORDER — HYDROCORTISONE ACETATE 25 MG/1
SUPPOSITORY RECTAL
Qty: 20 SUPPOSITORY | Refills: 0 | Status: SHIPPED | OUTPATIENT
Start: 2024-10-08

## 2024-10-14 ENCOUNTER — ANTI-COAG VISIT (OUTPATIENT)
Dept: CARDIOLOGY | Facility: HOSPITAL | Age: 69
End: 2024-10-14
Payer: MEDICARE

## 2024-10-14 DIAGNOSIS — Z95.2 H/O MECHANICAL AORTIC VALVE REPLACEMENT: Primary | ICD-10-CM

## 2024-10-14 DIAGNOSIS — Z79.01 LONG TERM (CURRENT) USE OF ANTICOAGULANTS: ICD-10-CM

## 2024-10-14 LAB
CTP QC/QA: YES
INR PPP: 2.7 (ref 2–3)
PROTHROMBIN TIME, POC: NORMAL (ref 2–3)

## 2024-10-14 PROCEDURE — 99211 OFF/OP EST MAY X REQ PHY/QHP: CPT

## 2024-11-12 ENCOUNTER — ANTI-COAG VISIT (OUTPATIENT)
Dept: CARDIOLOGY | Facility: HOSPITAL | Age: 69
End: 2024-11-12
Payer: MEDICARE

## 2024-11-12 DIAGNOSIS — Z79.01 LONG TERM (CURRENT) USE OF ANTICOAGULANTS: ICD-10-CM

## 2024-11-12 DIAGNOSIS — Z95.2 H/O MECHANICAL AORTIC VALVE REPLACEMENT: Primary | ICD-10-CM

## 2024-11-12 LAB
CTP QC/QA: YES
INR PPP: 4 (ref 2–3)
PROTHROMBIN TIME, POC: ABNORMAL (ref 2–3)

## 2024-11-12 PROCEDURE — 99211 OFF/OP EST MAY X REQ PHY/QHP: CPT

## 2024-11-12 NOTE — PROGRESS NOTES
POC pt/inr performed.  Pt instructed to hold warfarin today, then resume with decreased dosing.

## 2024-11-22 DIAGNOSIS — N18.32 STAGE 3B CHRONIC KIDNEY DISEASE: ICD-10-CM

## 2024-11-22 RX ORDER — POTASSIUM CHLORIDE 750 MG/1
10 TABLET, EXTENDED RELEASE ORAL 2 TIMES DAILY
Qty: 180 TABLET | Refills: 3 | Status: SHIPPED | OUTPATIENT
Start: 2024-11-22

## 2024-11-26 ENCOUNTER — ANTI-COAG VISIT (OUTPATIENT)
Dept: CARDIOLOGY | Facility: HOSPITAL | Age: 69
End: 2024-11-26
Payer: MEDICARE

## 2024-11-26 DIAGNOSIS — Z79.01 LONG TERM (CURRENT) USE OF ANTICOAGULANTS: ICD-10-CM

## 2024-11-26 DIAGNOSIS — Z95.2 H/O MECHANICAL AORTIC VALVE REPLACEMENT: Primary | ICD-10-CM

## 2024-11-26 LAB
CTP QC/QA: YES
INR PPP: 2.3 (ref 2–3)
PROTHROMBIN TIME, POC: NORMAL (ref 2–3)

## 2024-11-26 NOTE — PROGRESS NOTES
POC pt/inr performed.  Pt will take 5 mg of warfarin today, then will resume with dosage increase.

## 2024-12-17 ENCOUNTER — ANTI-COAG VISIT (OUTPATIENT)
Dept: CARDIOLOGY | Facility: HOSPITAL | Age: 69
End: 2024-12-17
Payer: MEDICARE

## 2024-12-17 DIAGNOSIS — Z79.01 LONG TERM (CURRENT) USE OF ANTICOAGULANTS: ICD-10-CM

## 2024-12-17 DIAGNOSIS — Z95.2 H/O MECHANICAL AORTIC VALVE REPLACEMENT: Primary | ICD-10-CM

## 2024-12-17 LAB
CTP QC/QA: YES
INR PPP: 3.8 (ref 2–3)
PROTHROMBIN TIME, POC: ABNORMAL (ref 2–3)

## 2024-12-17 PROCEDURE — 99211 OFF/OP EST MAY X REQ PHY/QHP: CPT

## 2024-12-17 NOTE — PROGRESS NOTES
POC pt/inr performed.  Pt will take only 2.5 mg of warfarin today, then will resume with decreased dosing.

## 2024-12-31 ENCOUNTER — ANTI-COAG VISIT (OUTPATIENT)
Dept: CARDIOLOGY | Facility: HOSPITAL | Age: 69
End: 2024-12-31
Payer: MEDICARE

## 2024-12-31 DIAGNOSIS — Z95.2 H/O MECHANICAL AORTIC VALVE REPLACEMENT: Primary | ICD-10-CM

## 2024-12-31 DIAGNOSIS — Z79.01 LONG TERM (CURRENT) USE OF ANTICOAGULANTS: ICD-10-CM

## 2024-12-31 LAB
CTP QC/QA: YES
INR PPP: 2.7 (ref 2–3)
PROTHROMBIN TIME, POC: NORMAL (ref 2–3)

## 2025-01-29 ENCOUNTER — ANTI-COAG VISIT (OUTPATIENT)
Dept: CARDIOLOGY | Facility: HOSPITAL | Age: 70
End: 2025-01-29
Payer: MEDICARE

## 2025-01-29 DIAGNOSIS — Z79.01 LONG TERM (CURRENT) USE OF ANTICOAGULANTS: ICD-10-CM

## 2025-01-29 DIAGNOSIS — Z95.2 H/O MECHANICAL AORTIC VALVE REPLACEMENT: Primary | ICD-10-CM

## 2025-01-29 LAB
CTP QC/QA: YES
INR PPP: 2.7 (ref 2–3)
PROTHROMBIN TIME, POC: NORMAL (ref 2–3)

## 2025-01-29 PROCEDURE — 99211 OFF/OP EST MAY X REQ PHY/QHP: CPT

## 2025-02-05 ENCOUNTER — TELEPHONE (OUTPATIENT)
Dept: INTERNAL MEDICINE | Facility: CLINIC | Age: 70
End: 2025-02-05
Payer: MEDICARE

## 2025-02-13 ENCOUNTER — LAB VISIT (OUTPATIENT)
Dept: LAB | Facility: HOSPITAL | Age: 70
End: 2025-02-13
Attending: INTERNAL MEDICINE
Payer: MEDICARE

## 2025-02-13 DIAGNOSIS — E78.2 MIXED HYPERLIPIDEMIA: Chronic | ICD-10-CM

## 2025-02-13 DIAGNOSIS — N18.32 STAGE 3B CHRONIC KIDNEY DISEASE: ICD-10-CM

## 2025-02-13 DIAGNOSIS — E11.9 TYPE 2 DIABETES MELLITUS WITHOUT COMPLICATION, WITHOUT LONG-TERM CURRENT USE OF INSULIN: Chronic | ICD-10-CM

## 2025-02-13 DIAGNOSIS — E03.9 ACQUIRED HYPOTHYROIDISM: Chronic | ICD-10-CM

## 2025-02-13 PROBLEM — M81.0 AGE-RELATED OSTEOPOROSIS WITHOUT CURRENT PATHOLOGICAL FRACTURE: Status: ACTIVE | Noted: 2025-02-13

## 2025-02-13 LAB
ALBUMIN SERPL-MCNC: 3.9 G/DL (ref 3.4–4.8)
ALBUMIN/GLOB SERPL: 1.2 RATIO (ref 1.1–2)
ALP SERPL-CCNC: 115 UNIT/L (ref 40–150)
ALT SERPL-CCNC: 41 UNIT/L (ref 0–55)
ANION GAP SERPL CALC-SCNC: 7 MEQ/L
AST SERPL-CCNC: 40 UNIT/L (ref 5–34)
BASOPHILS # BLD AUTO: 0.09 X10(3)/MCL
BASOPHILS NFR BLD AUTO: 0.9 %
BILIRUB SERPL-MCNC: 1.1 MG/DL
BUN SERPL-MCNC: 23.8 MG/DL (ref 8.4–25.7)
CALCIUM SERPL-MCNC: 9.3 MG/DL (ref 8.8–10)
CHLORIDE SERPL-SCNC: 108 MMOL/L (ref 98–107)
CHOLEST SERPL-MCNC: 104 MG/DL
CHOLEST/HDLC SERPL: 2 {RATIO} (ref 0–5)
CO2 SERPL-SCNC: 27 MMOL/L (ref 23–31)
CREAT SERPL-MCNC: 1.64 MG/DL (ref 0.72–1.25)
CREAT/UREA NIT SERPL: 15
EOSINOPHIL # BLD AUTO: 0.25 X10(3)/MCL (ref 0–0.9)
EOSINOPHIL NFR BLD AUTO: 2.6 %
ERYTHROCYTE [DISTWIDTH] IN BLOOD BY AUTOMATED COUNT: 16.3 % (ref 11.5–17)
EST. AVERAGE GLUCOSE BLD GHB EST-MCNC: 119.8 MG/DL
GFR SERPLBLD CREATININE-BSD FMLA CKD-EPI: 44 ML/MIN/1.73/M2
GLOBULIN SER-MCNC: 3.2 GM/DL (ref 2.4–3.5)
GLUCOSE SERPL-MCNC: 94 MG/DL (ref 82–115)
HBA1C MFR BLD: 5.8 %
HCT VFR BLD AUTO: 48.8 % (ref 42–52)
HDLC SERPL-MCNC: 51 MG/DL (ref 35–60)
HGB BLD-MCNC: 15.2 G/DL (ref 14–18)
IMM GRANULOCYTES # BLD AUTO: 0.05 X10(3)/MCL (ref 0–0.04)
IMM GRANULOCYTES NFR BLD AUTO: 0.5 %
LDLC SERPL CALC-MCNC: 41 MG/DL (ref 50–140)
LYMPHOCYTES # BLD AUTO: 0.76 X10(3)/MCL (ref 0.6–4.6)
LYMPHOCYTES NFR BLD AUTO: 7.9 %
MCH RBC QN AUTO: 30.2 PG (ref 27–31)
MCHC RBC AUTO-ENTMCNC: 31.1 G/DL (ref 33–36)
MCV RBC AUTO: 97 FL (ref 80–94)
MONOCYTES # BLD AUTO: 1.48 X10(3)/MCL (ref 0.1–1.3)
MONOCYTES NFR BLD AUTO: 15.4 %
NEUTROPHILS # BLD AUTO: 6.98 X10(3)/MCL (ref 2.1–9.2)
NEUTROPHILS NFR BLD AUTO: 72.7 %
NRBC BLD AUTO-RTO: 0 %
PLATELET # BLD AUTO: 335 X10(3)/MCL (ref 130–400)
PMV BLD AUTO: 9.8 FL (ref 7.4–10.4)
POTASSIUM SERPL-SCNC: 4.3 MMOL/L (ref 3.5–5.1)
PROT SERPL-MCNC: 7.1 GM/DL (ref 5.8–7.6)
RBC # BLD AUTO: 5.03 X10(6)/MCL (ref 4.7–6.1)
SODIUM SERPL-SCNC: 142 MMOL/L (ref 136–145)
TRIGL SERPL-MCNC: 61 MG/DL (ref 34–140)
TSH SERPL-ACNC: 2.01 UIU/ML (ref 0.35–4.94)
VLDLC SERPL CALC-MCNC: 12 MG/DL
WBC # BLD AUTO: 9.61 X10(3)/MCL (ref 4.5–11.5)

## 2025-02-13 PROCEDURE — 36415 COLL VENOUS BLD VENIPUNCTURE: CPT

## 2025-02-13 PROCEDURE — 80061 LIPID PANEL: CPT

## 2025-02-13 PROCEDURE — 85025 COMPLETE CBC W/AUTO DIFF WBC: CPT

## 2025-02-13 PROCEDURE — 80053 COMPREHEN METABOLIC PANEL: CPT

## 2025-02-13 PROCEDURE — 84443 ASSAY THYROID STIM HORMONE: CPT

## 2025-02-13 PROCEDURE — 83036 HEMOGLOBIN GLYCOSYLATED A1C: CPT

## 2025-02-14 ENCOUNTER — HOSPITAL ENCOUNTER (OUTPATIENT)
Dept: RADIOLOGY | Facility: HOSPITAL | Age: 70
Discharge: HOME | End: 2025-02-14
Attending: INTERNAL MEDICINE
Payer: MEDICARE

## 2025-02-14 ENCOUNTER — OFFICE VISIT (OUTPATIENT)
Dept: INTERNAL MEDICINE | Facility: CLINIC | Age: 70
End: 2025-02-14
Payer: MEDICARE

## 2025-02-14 VITALS
HEIGHT: 72 IN | RESPIRATION RATE: 18 BRPM | SYSTOLIC BLOOD PRESSURE: 130 MMHG | HEART RATE: 79 BPM | DIASTOLIC BLOOD PRESSURE: 80 MMHG | BODY MASS INDEX: 25.19 KG/M2 | OXYGEN SATURATION: 97 % | WEIGHT: 186 LBS

## 2025-02-14 DIAGNOSIS — N18.30 ANEMIA DUE TO STAGE 3 CHRONIC KIDNEY DISEASE, UNSPECIFIED WHETHER STAGE 3A OR 3B CKD: Chronic | ICD-10-CM

## 2025-02-14 DIAGNOSIS — N18.32 STAGE 3B CHRONIC KIDNEY DISEASE: Chronic | ICD-10-CM

## 2025-02-14 DIAGNOSIS — M25.512 ACUTE PAIN OF LEFT SHOULDER: ICD-10-CM

## 2025-02-14 DIAGNOSIS — N18.2 TYPE 2 DIABETES MELLITUS WITH STAGE 2 CHRONIC KIDNEY DISEASE, UNSPECIFIED WHETHER LONG TERM INSULIN USE: Primary | ICD-10-CM

## 2025-02-14 DIAGNOSIS — E11.22 TYPE 2 DIABETES MELLITUS WITH STAGE 2 CHRONIC KIDNEY DISEASE, UNSPECIFIED WHETHER LONG TERM INSULIN USE: Primary | ICD-10-CM

## 2025-02-14 DIAGNOSIS — D63.1 ANEMIA DUE TO STAGE 3 CHRONIC KIDNEY DISEASE, UNSPECIFIED WHETHER STAGE 3A OR 3B CKD: Chronic | ICD-10-CM

## 2025-02-14 DIAGNOSIS — E78.2 MIXED HYPERLIPIDEMIA: Chronic | ICD-10-CM

## 2025-02-14 DIAGNOSIS — I48.0 PAROXYSMAL ATRIAL FIBRILLATION: ICD-10-CM

## 2025-02-14 DIAGNOSIS — E03.9 ACQUIRED HYPOTHYROIDISM: Chronic | ICD-10-CM

## 2025-02-14 DIAGNOSIS — M81.0 AGE-RELATED OSTEOPOROSIS WITHOUT CURRENT PATHOLOGICAL FRACTURE: ICD-10-CM

## 2025-02-14 PROCEDURE — 73030 X-RAY EXAM OF SHOULDER: CPT | Mod: TC,LT

## 2025-02-14 NOTE — PROGRESS NOTES
Cory Watts MD        PATIENT NAME: Bertin Alonzo  : 1955  DATE: 25  MRN: [id number]      Billing Provider: Cory Watts MD  Level of Service: CA OFFICE/OUTPT VISIT, EST, LEVL IV, 30-39 MIN  Patient PCP Information       Provider PCP Type    Cory Watts MD General            Reason for Visit / Chief Complaint: Diabetes and Shoulder Pain (Patient reports he has left shoulder joint pain, he states pain has been present for about a month.)       Update PCP  Update Chief Complaint         History of Present Illness / Problem Focused Workflow     Bertin Alonzo presents to the clinic with Diabetes and Shoulder Pain (Patient reports he has left shoulder joint pain, he states pain has been present for about a month.)     Bertin is here six-month visit   Cardiac-wise he has had some issues having heart attack he had an arrest during angiogram.    Does report that he had a positive Cologuard saw Dr. Vegas felt he was fairly high-risk do a colonoscopy.  He is also having left shoulder pain for one-month.  He complains of having a blueness and numbness in his feet        Review of Systems   Review of Systems   Constitutional: Negative.    HENT: Negative.     Eyes: Negative.    Respiratory: Negative.     Cardiovascular: Negative.    Gastrointestinal: Negative.    Endocrine: Negative.    Genitourinary: Negative.    Musculoskeletal: Negative.    Integumentary:  Negative.   Neurological: Negative.    Psychiatric/Behavioral: Negative.          Medical / Social / Family History     Past Medical History:   Diagnosis Date    CAD (coronary artery disease)     CHF (congestive heart failure), NYHA class I, chronic, diastolic     Hypothyroidism, unspecified     Paroxysmal atrial fibrillation     S/P aortic valve replacement and aortoplasty     S/P CABG (coronary artery bypass graft)     Stage 3b chronic kidney disease 2023       Past Surgical History:   Procedure Laterality Date     AORTIC VALUE      AORTIC VALVE REPLACEMENT      w aortoplasty    APPENDECTOMY      BYPASS OF MESENTERIC ARTERY      CARDIAC VALVE REPLACEMENT  February 6, 2006    CATARACT EXTRACTION, BILATERAL      CORONARY ARTERY BYPASS GRAFT  2006, 2021    EYE SURGERY      Cataracts    PLEURODESIS USING TALC      SPLENECTOMY, TOTAL      STENTS      CORONARY    THYROID SURGERY         Social History  Mr. Alonzo  reports that he has quit smoking. His smoking use included cigarettes and cigars. He has never used smokeless tobacco. He reports that he does not currently use alcohol after a past usage of about 1.0 standard drink of alcohol per week. He reports that he does not use drugs.    Family History  Mr.'s Alonzo  family history includes Cancer in his father and mother; Hodgkin's lymphoma in his mother.    Medications and Allergies     Medications  Outpatient Medications Marked as Taking for the 2/14/25 encounter (Office Visit) with Cory Villanueva MD   Medication Sig Dispense Refill    amiodarone (PACERONE) 200 MG Tab Take 200 mg by mouth once daily.      ANUCORT-HC 25 mg suppository UNWRAP AND INSERT 1 SUPPOSITORY(25 MG) RECTALLY TWICE DAILY FOR 10 DAYS 20 suppository 0    ascorbic acid, vitamin C, (VITAMIN C) 250 MG tablet Take 250 mg by mouth 2 (two) times daily.      cholecalciferol, vitamin D3, (VITAMIN D3) 25 mcg (1,000 unit) capsule Take 1,000 Units by mouth once daily.      cloNIDine (CATAPRES) 0.1 MG tablet Take 0.1 mg by mouth as needed.      clopidogreL (PLAVIX) 75 mg tablet Take 75 mg by mouth once daily.      FARXIGA 10 mg tablet TAKE 1 TABLET BY MOUTH 1 TIME EACH DAY      ferrous sulfate, dried (IRON) 159 mg (45 mg iron) TbSR       furosemide (LASIX) 40 MG tablet Take 40 mg by mouth as needed.      levothyroxine (SYNTHROID) 112 MCG tablet TAKE 1 TABLET(112 MCG) BY MOUTH BEFORE BREAKFAST 30 tablet 11    metoprolol succinate (TOPROL-XL) 25 MG 24 hr tablet Take 25 mg by mouth once daily.      nitroGLYCERIN  (NITROSTAT) 0.4 MG SL tablet Place 0.4 mg under the tongue daily as needed.      potassium chloride SA (K-DUR,KLOR-CON M) 10 MEQ tablet TAKE 1 TABLET BY MOUTH TWICE DAILY 180 tablet 3    rosuvastatin (CRESTOR) 40 MG Tab Take 40 mg by mouth nightly.      warfarin (COUMADIN) 2.5 MG tablet TAKE 1 TABLET BY MOUTH EVERY TUESDAY, THURSDAY, SATURDAY, SUNDAY, 5 MG ON MONDAY, WEDNESDAY, FRIDAY (Patient taking differently: Take 2.5 mg by mouth Daily.) 135 tablet 3       Allergies  Review of patient's allergies indicates:   Allergen Reactions    Digoxin      Other reaction(s): Toxicity  Other reaction(s): Toxicity  Other reaction(s): Toxicity       Physical Examination     Vitals:    02/14/25 1046   BP: 130/80   Pulse: 79   Resp: 18     Physical Exam  Vitals reviewed.   Constitutional:       Appearance: Normal appearance.   HENT:      Head: Normocephalic.      Right Ear: Tympanic membrane normal.      Left Ear: Tympanic membrane normal.      Nose: Nose normal.      Mouth/Throat:      Pharynx: Oropharynx is clear.   Eyes:      Extraocular Movements: Extraocular movements intact.      Pupils: Pupils are equal, round, and reactive to light.   Cardiovascular:      Rate and Rhythm: Normal rate and regular rhythm.      Pulses: Normal pulses.           Dorsalis pedis pulses are 2+ on the right side and 2+ on the left side.        Posterior tibial pulses are 2+ on the right side and 2+ on the left side.      Heart sounds: Normal heart sounds.   Pulmonary:      Effort: Pulmonary effort is normal.      Breath sounds: Normal breath sounds.   Abdominal:      General: Abdomen is flat. Bowel sounds are normal.      Palpations: Abdomen is soft.   Genitourinary:     Testes: Normal.      Prostate: Normal.      Rectum: Normal.   Musculoskeletal:         General: Normal range of motion.      Cervical back: Normal range of motion.      Comments: Tenderness and decreased range of motion left shoulder   Feet:      Right foot:      Protective  Sensation: 2 sites tested.  2 sites sensed.      Skin integrity: Dry skin present.      Left foot:      Protective Sensation: 2 sites tested.  2 sites sensed.      Skin integrity: Dry skin present.   Skin:     General: Skin is warm and dry.   Neurological:      General: No focal deficit present.      Mental Status: He is alert and oriented to person, place, and time.   Psychiatric:         Mood and Affect: Mood normal.         Behavior: Behavior normal.          Assessment and Plan (including Health Maintenance)      Problem List  Smart Sets  Document Outside HM   :    Plan:    ICD-10-CM ICD-9-CM   1. Type 2 diabetes mellitus with stage 2 chronic kidney disease, unspecified whether long term insulin use  E11.22 250.40    N18.2 585.2   2. Paroxysmal atrial fibrillation  I48.0 427.31   3. Anemia due to stage 3 chronic kidney disease, unspecified whether stage 3a or 3b CKD  N18.30 285.21    D63.1 585.3   4. Mixed hyperlipidemia  E78.2 272.2   5. Acquired hypothyroidism  E03.9 244.9   6. Age-related osteoporosis without current pathological fracture  M81.0 733.01   7. Stage 3b chronic kidney disease  N18.32 585.3   8. Acute pain of left shoulder  M25.512 719.41       Problem List Items Addressed This Visit          Cardiac/Vascular    Paroxysmal atrial fibrillation (Chronic)     Stable         Hyperlipidemia (Chronic)     Lipid level stable continue treatment            Renal/    RESOLVED: Stage 3b chronic kidney disease (Chronic)     Kidney function stable            Oncology    Anemia due to chronic kidney disease (Chronic)     Kidney function stable            Endocrine    Type 2 diabetes mellitus with stage 2 chronic kidney disease, unspecified whether long term insulin use - Primary     A1c and diabetes stable   Vascular evaluation ordered in his legs.         Age-related osteoporosis without current pathological fracture     Stable         Hypothyroidism, unspecified (Chronic)     Level stable continue  treatment            Orthopedic    Acute pain of left shoulder     Shoulder x-ray and physical therapy ordered                   Health Maintenance Due   Topic Date Due    Hepatitis C Screening  Never done    Foot Exam  Never done    TETANUS VACCINE  Never done    RSV Vaccine (Age 60+ and Pregnant patients) (1 - Risk 60-74 years 1-dose series) Never done    Shingles Vaccine (3 of 3) 06/06/2022    COVID-19 Vaccine (7 - 2024-25 season) 09/01/2024    Pneumococcal Vaccines (Age 50+) (3 of 3 - PCV20 or PCV21) 12/04/2024    Diabetic Eye Exam  03/11/2025       Problem List Items Addressed This Visit          Cardiac/Vascular    Paroxysmal atrial fibrillation (Chronic)    Current Assessment & Plan     Stable         Hyperlipidemia (Chronic)    Current Assessment & Plan     Lipid level stable continue treatment            Renal/    RESOLVED: Stage 3b chronic kidney disease (Chronic)    Current Assessment & Plan     Kidney function stable            Oncology    Anemia due to chronic kidney disease (Chronic)    Current Assessment & Plan     Kidney function stable            Endocrine    Type 2 diabetes mellitus with stage 2 chronic kidney disease, unspecified whether long term insulin use - Primary    Current Assessment & Plan     A1c and diabetes stable   Vascular evaluation ordered in his legs.         Age-related osteoporosis without current pathological fracture    Current Assessment & Plan     Stable         Hypothyroidism, unspecified (Chronic)    Current Assessment & Plan     Level stable continue treatment            Orthopedic    Acute pain of left shoulder    Current Assessment & Plan     Shoulder x-ray and physical therapy ordered            Health Maintenance Topics with due status: Not Due       Topic Last Completion Date    Colorectal Cancer Screening 02/20/2024    Lipid Panel 02/13/2025    Hemoglobin A1c 02/13/2025       Future Appointments   Date Time Provider Department Center   2/26/2025 10:40 AM [facility]   4/22/2025 11:40 AM [facility]   9/23/2025 10:40 AM Cory Villanueva MD [place] [address]        I spent a total of 38 minutes on the day of the visit.This includes face to face time and non-face to face time preparing to see the patient (eg, review of tests), obtaining and/or reviewing separately obtained history, documenting clinical information in the electronic or other health record, independently interpreting results and communicating results to the patient/family/caregiver, or care coordinator.      Signature:  Cory Villanueva MD  [facility]  [address] [place] [phone]    Date of encounter: 2/14/25

## 2025-02-17 ENCOUNTER — RESULTS FOLLOW-UP (OUTPATIENT)
Dept: INTERNAL MEDICINE | Facility: CLINIC | Age: 70
End: 2025-02-17
Payer: MEDICARE

## 2025-02-17 NOTE — TELEPHONE ENCOUNTER
----- Message from Cory Villanueva MD sent at 2/17/2025  7:42 AM CST -----      ----- Message -----  From: Interface, Rad Results In  Sent: 2/14/2025  12:33 PM CST  To: Cory Villanueva MD

## 2025-02-26 ENCOUNTER — ANTI-COAG VISIT (OUTPATIENT)
Dept: CARDIOLOGY | Facility: HOSPITAL | Age: 70
End: 2025-02-26
Payer: MEDICARE

## 2025-02-26 DIAGNOSIS — Z79.01 LONG TERM (CURRENT) USE OF ANTICOAGULANTS: ICD-10-CM

## 2025-02-26 DIAGNOSIS — Z95.2 H/O MECHANICAL AORTIC VALVE REPLACEMENT: Primary | ICD-10-CM

## 2025-02-26 LAB
CTP QC/QA: YES
INR PPP: 2.3 (ref 2–3)
PROTHROMBIN TIME, POC: NORMAL (ref 2–3)

## 2025-02-26 PROCEDURE — 99211 OFF/OP EST MAY X REQ PHY/QHP: CPT

## 2025-03-11 ENCOUNTER — LAB VISIT (OUTPATIENT)
Dept: LAB | Facility: HOSPITAL | Age: 70
End: 2025-03-11
Attending: INTERNAL MEDICINE
Payer: MEDICARE

## 2025-03-11 DIAGNOSIS — N18.32 CHRONIC KIDNEY DISEASE (CKD) STAGE G3B/A1, MODERATELY DECREASED GLOMERULAR FILTRATION RATE (GFR) BETWEEN 30-44 ML/MIN/1.73 SQUARE METER AND ALBUMINURIA CREATININE RATIO LESS THAN 30 MG/G: Primary | ICD-10-CM

## 2025-03-11 DIAGNOSIS — N18.6 TYPE 2 DIABETES MELLITUS WITH END-STAGE RENAL DISEASE: ICD-10-CM

## 2025-03-11 DIAGNOSIS — R80.9 PROTEINURIA, UNSPECIFIED TYPE: ICD-10-CM

## 2025-03-11 DIAGNOSIS — D63.1 ANEMIA OF CHRONIC RENAL FAILURE, UNSPECIFIED CKD STAGE: ICD-10-CM

## 2025-03-11 DIAGNOSIS — E11.22 TYPE 2 DIABETES MELLITUS WITH END-STAGE RENAL DISEASE: ICD-10-CM

## 2025-03-11 DIAGNOSIS — N18.9 ANEMIA OF CHRONIC RENAL FAILURE, UNSPECIFIED CKD STAGE: ICD-10-CM

## 2025-03-11 DIAGNOSIS — I12.9 PARENCHYMAL RENAL HYPERTENSION: ICD-10-CM

## 2025-03-11 LAB
25(OH)D3+25(OH)D2 SERPL-MCNC: 35 NG/ML (ref 30–80)
ALBUMIN SERPL-MCNC: 3.7 G/DL (ref 3.4–4.8)
ALBUMIN/GLOB SERPL: 1 RATIO (ref 1.1–2)
ALP SERPL-CCNC: 118 UNIT/L (ref 40–150)
ALT SERPL-CCNC: 41 UNIT/L (ref 0–55)
ANION GAP SERPL CALC-SCNC: 6 MEQ/L
AST SERPL-CCNC: 42 UNIT/L (ref 5–34)
BASOPHILS # BLD AUTO: 0.12 X10(3)/MCL
BASOPHILS NFR BLD AUTO: 1.4 %
BILIRUB SERPL-MCNC: 0.9 MG/DL
BUN SERPL-MCNC: 27.7 MG/DL (ref 8.4–25.7)
CALCIUM SERPL-MCNC: 9.2 MG/DL (ref 8.8–10)
CHLORIDE SERPL-SCNC: 107 MMOL/L (ref 98–107)
CO2 SERPL-SCNC: 27 MMOL/L (ref 23–31)
CREAT SERPL-MCNC: 1.8 MG/DL (ref 0.72–1.25)
CREAT/UREA NIT SERPL: 15
EOSINOPHIL # BLD AUTO: 0.17 X10(3)/MCL (ref 0–0.9)
EOSINOPHIL NFR BLD AUTO: 1.9 %
ERYTHROCYTE [DISTWIDTH] IN BLOOD BY AUTOMATED COUNT: 15.9 % (ref 11.5–17)
GFR SERPLBLD CREATININE-BSD FMLA CKD-EPI: 40 ML/MIN/1.73/M2
GLOBULIN SER-MCNC: 3.8 GM/DL (ref 2.4–3.5)
GLUCOSE SERPL-MCNC: 114 MG/DL (ref 82–115)
HCT VFR BLD AUTO: 49.2 % (ref 42–52)
HGB BLD-MCNC: 15.3 G/DL (ref 14–18)
IMM GRANULOCYTES # BLD AUTO: 0.04 X10(3)/MCL (ref 0–0.04)
IMM GRANULOCYTES NFR BLD AUTO: 0.5 %
LYMPHOCYTES # BLD AUTO: 0.73 X10(3)/MCL (ref 0.6–4.6)
LYMPHOCYTES NFR BLD AUTO: 8.4 %
MAGNESIUM SERPL-MCNC: 2.4 MG/DL (ref 1.6–2.6)
MCH RBC QN AUTO: 30.8 PG (ref 27–31)
MCHC RBC AUTO-ENTMCNC: 31.1 G/DL (ref 33–36)
MCV RBC AUTO: 99 FL (ref 80–94)
MONOCYTES # BLD AUTO: 1.35 X10(3)/MCL (ref 0.1–1.3)
MONOCYTES NFR BLD AUTO: 15.5 %
NEUTROPHILS # BLD AUTO: 6.32 X10(3)/MCL (ref 2.1–9.2)
NEUTROPHILS NFR BLD AUTO: 72.3 %
NRBC BLD AUTO-RTO: 0 %
PHOSPHATE SERPL-MCNC: 2.8 MG/DL (ref 2.3–4.7)
PLATELET # BLD AUTO: 322 X10(3)/MCL (ref 130–400)
PMV BLD AUTO: 10 FL (ref 7.4–10.4)
POTASSIUM SERPL-SCNC: 4.7 MMOL/L (ref 3.5–5.1)
PROT SERPL-MCNC: 7.5 GM/DL (ref 5.8–7.6)
PTH-INTACT SERPL-MCNC: 40.5 PG/ML (ref 8.7–77)
RBC # BLD AUTO: 4.97 X10(6)/MCL (ref 4.7–6.1)
SODIUM SERPL-SCNC: 140 MMOL/L (ref 136–145)
WBC # BLD AUTO: 8.73 X10(3)/MCL (ref 4.5–11.5)

## 2025-03-11 PROCEDURE — 82306 VITAMIN D 25 HYDROXY: CPT

## 2025-03-11 PROCEDURE — 83735 ASSAY OF MAGNESIUM: CPT

## 2025-03-11 PROCEDURE — 80053 COMPREHEN METABOLIC PANEL: CPT

## 2025-03-11 PROCEDURE — 85025 COMPLETE CBC W/AUTO DIFF WBC: CPT

## 2025-03-11 PROCEDURE — 84100 ASSAY OF PHOSPHORUS: CPT

## 2025-03-11 PROCEDURE — 36415 COLL VENOUS BLD VENIPUNCTURE: CPT

## 2025-03-11 PROCEDURE — 83970 ASSAY OF PARATHORMONE: CPT

## 2025-03-13 ENCOUNTER — TELEPHONE (OUTPATIENT)
Dept: INTERNAL MEDICINE | Facility: CLINIC | Age: 70
End: 2025-03-13
Payer: MEDICARE

## 2025-03-13 NOTE — TELEPHONE ENCOUNTER
----- Message from Elizabeth sent at 3/13/2025  2:58 PM CDT -----  Regarding: advice  Who Called: Bertin Ferro is requesting assistance/information from provider's office.Symptoms (please be specific):  How long has patient had these symptoms:  List of preferred pharmacies on file (remove unneeded): [unfilled]If different, enter pharmacy into here including location and phone number: Preferred Method of Contact: Phone CallPatient's Preferred Phone Number on File: [phone] Best Call Back Number, if different: Additional Information: stated that the referral for vascular surgery was sent to the wrong physician, and is needing it to be sent to Dr. Jesse Ibrahim vascular (fax [phone]). Stated that he seen him before and would like to go back to him. Please advise

## 2025-03-14 ENCOUNTER — LAB VISIT (OUTPATIENT)
Dept: LAB | Facility: HOSPITAL | Age: 70
End: 2025-03-14
Attending: INTERNAL MEDICINE
Payer: MEDICARE

## 2025-03-14 DIAGNOSIS — R80.9 PROTEINURIA, UNSPECIFIED TYPE: ICD-10-CM

## 2025-03-14 DIAGNOSIS — N18.9 ANEMIA OF CHRONIC RENAL FAILURE, UNSPECIFIED CKD STAGE: ICD-10-CM

## 2025-03-14 DIAGNOSIS — E11.22 TYPE 2 DIABETES MELLITUS WITH END-STAGE RENAL DISEASE: ICD-10-CM

## 2025-03-14 DIAGNOSIS — N18.32 CHRONIC KIDNEY DISEASE (CKD) STAGE G3B/A1, MODERATELY DECREASED GLOMERULAR FILTRATION RATE (GFR) BETWEEN 30-44 ML/MIN/1.73 SQUARE METER AND ALBUMINURIA CREATININE RATIO LESS THAN 30 MG/G: Primary | ICD-10-CM

## 2025-03-14 DIAGNOSIS — I12.9 PARENCHYMAL RENAL HYPERTENSION: ICD-10-CM

## 2025-03-14 DIAGNOSIS — N18.6 TYPE 2 DIABETES MELLITUS WITH END-STAGE RENAL DISEASE: ICD-10-CM

## 2025-03-14 DIAGNOSIS — D63.1 ANEMIA OF CHRONIC RENAL FAILURE, UNSPECIFIED CKD STAGE: ICD-10-CM

## 2025-03-14 LAB
CREAT 24H UR-MCNC: 985.3 MG/DAY (ref 710–1650)
CREAT UR-MCNC: 33.4 MG/DL (ref 63–166)
PROT 24H UR-MCNC: 941.1 MG/24 H (ref 0–165)
PROT UR STRIP-MCNC: 31.9 MG/DL
TOTAL VOLUME  (OHS): 2950 ML
TOTAL VOLUME  (OHS): 2950 ML

## 2025-03-14 PROCEDURE — 82570 ASSAY OF URINE CREATININE: CPT

## 2025-03-14 PROCEDURE — 84156 ASSAY OF PROTEIN URINE: CPT

## 2025-03-18 ENCOUNTER — DOCUMENTATION ONLY (OUTPATIENT)
Dept: INTERNAL MEDICINE | Facility: CLINIC | Age: 70
End: 2025-03-18
Payer: MEDICARE

## 2025-03-26 ENCOUNTER — ANTI-COAG VISIT (OUTPATIENT)
Dept: CARDIOLOGY | Facility: HOSPITAL | Age: 70
End: 2025-03-26
Payer: MEDICARE

## 2025-03-26 DIAGNOSIS — Z95.2 H/O MECHANICAL AORTIC VALVE REPLACEMENT: Primary | ICD-10-CM

## 2025-03-26 DIAGNOSIS — Z79.01 LONG TERM (CURRENT) USE OF ANTICOAGULANTS: ICD-10-CM

## 2025-03-26 LAB
CTP QC/QA: YES
INR PPP: 2.3 (ref 2–3)
PROTHROMBIN TIME, POC: NORMAL (ref 2–3)

## 2025-03-26 PROCEDURE — 99211 OFF/OP EST MAY X REQ PHY/QHP: CPT

## 2025-04-02 ENCOUNTER — ANTI-COAG VISIT (OUTPATIENT)
Dept: CARDIOLOGY | Facility: HOSPITAL | Age: 70
End: 2025-04-02
Payer: MEDICARE

## 2025-04-02 DIAGNOSIS — Z95.2 H/O MECHANICAL AORTIC VALVE REPLACEMENT: Primary | ICD-10-CM

## 2025-04-02 DIAGNOSIS — Z79.01 LONG TERM (CURRENT) USE OF ANTICOAGULANTS: ICD-10-CM

## 2025-04-02 LAB
CTP QC/QA: YES
INR PPP: 2.3 (ref 2–3)
PROTHROMBIN TIME, POC: NORMAL (ref 2–3)

## 2025-04-02 PROCEDURE — 99211 OFF/OP EST MAY X REQ PHY/QHP: CPT

## 2025-04-16 ENCOUNTER — ANTI-COAG VISIT (OUTPATIENT)
Dept: CARDIOLOGY | Facility: HOSPITAL | Age: 70
End: 2025-04-16
Payer: MEDICARE

## 2025-04-16 DIAGNOSIS — Z95.2 H/O MECHANICAL AORTIC VALVE REPLACEMENT: Primary | ICD-10-CM

## 2025-04-16 DIAGNOSIS — Z79.01 LONG TERM (CURRENT) USE OF ANTICOAGULANTS: ICD-10-CM

## 2025-04-16 LAB
CTP QC/QA: YES
INR PPP: 3.1 (ref 2–3)
PROTHROMBIN TIME, POC: ABNORMAL (ref 2–3)

## 2025-04-24 DIAGNOSIS — E03.9 HYPOTHYROIDISM, UNSPECIFIED TYPE: ICD-10-CM

## 2025-04-24 RX ORDER — LEVOTHYROXINE SODIUM 112 UG/1
112 TABLET ORAL
Qty: 30 TABLET | Refills: 11 | Status: SHIPPED | OUTPATIENT
Start: 2025-04-24

## 2025-05-07 ENCOUNTER — ANTI-COAG VISIT (OUTPATIENT)
Dept: CARDIOLOGY | Facility: HOSPITAL | Age: 70
End: 2025-05-07
Payer: MEDICARE

## 2025-05-07 DIAGNOSIS — Z79.01 LONG TERM (CURRENT) USE OF ANTICOAGULANTS: ICD-10-CM

## 2025-05-07 DIAGNOSIS — Z95.2 H/O MECHANICAL AORTIC VALVE REPLACEMENT: Primary | ICD-10-CM

## 2025-05-07 LAB
CTP QC/QA: YES
INR PPP: 1.9 (ref 2–3)
PROTHROMBIN TIME, POC: ABNORMAL (ref 2–3)

## 2025-05-07 PROCEDURE — 99211 OFF/OP EST MAY X REQ PHY/QHP: CPT

## 2025-05-15 ENCOUNTER — ANTI-COAG VISIT (OUTPATIENT)
Dept: CARDIOLOGY | Facility: HOSPITAL | Age: 70
End: 2025-05-15
Payer: MEDICARE

## 2025-05-15 DIAGNOSIS — Z95.2 H/O MECHANICAL AORTIC VALVE REPLACEMENT: Primary | ICD-10-CM

## 2025-05-15 DIAGNOSIS — Z79.01 LONG TERM (CURRENT) USE OF ANTICOAGULANTS: ICD-10-CM

## 2025-05-15 LAB
CTP QC/QA: YES
INR PPP: 2.5 (ref 2–3)
PROTHROMBIN TIME, POC: NORMAL (ref 2–3)

## 2025-06-05 ENCOUNTER — ANTI-COAG VISIT (OUTPATIENT)
Dept: CARDIOLOGY | Facility: HOSPITAL | Age: 70
End: 2025-06-05
Payer: MEDICARE

## 2025-06-05 DIAGNOSIS — Z95.2 H/O MECHANICAL AORTIC VALVE REPLACEMENT: Primary | ICD-10-CM

## 2025-06-05 DIAGNOSIS — Z79.01 LONG TERM (CURRENT) USE OF ANTICOAGULANTS: ICD-10-CM

## 2025-06-05 LAB
CTP QC/QA: YES
INR PPP: 2 (ref 2–3)
PROTHROMBIN TIME, POC: NORMAL (ref 2–3)

## 2025-06-05 PROCEDURE — 99211 OFF/OP EST MAY X REQ PHY/QHP: CPT

## 2025-06-08 DIAGNOSIS — Z79.01 ANTICOAGULANT LONG-TERM USE: ICD-10-CM

## 2025-06-08 DIAGNOSIS — I50.32 CHF (CONGESTIVE HEART FAILURE), NYHA CLASS I, CHRONIC, DIASTOLIC: ICD-10-CM

## 2025-06-09 RX ORDER — WARFARIN 2.5 MG/1
TABLET ORAL
Qty: 135 TABLET | Refills: 3 | Status: SHIPPED | OUTPATIENT
Start: 2025-06-09

## 2025-06-17 ENCOUNTER — ANTI-COAG VISIT (OUTPATIENT)
Dept: CARDIOLOGY | Facility: HOSPITAL | Age: 70
End: 2025-06-17
Payer: MEDICARE

## 2025-06-17 DIAGNOSIS — Z95.2 H/O MECHANICAL AORTIC VALVE REPLACEMENT: Primary | ICD-10-CM

## 2025-06-17 DIAGNOSIS — Z79.01 LONG TERM (CURRENT) USE OF ANTICOAGULANTS: ICD-10-CM

## 2025-06-17 LAB
CTP QC/QA: YES
INR PPP: 1.8 (ref 2–3)
PROTHROMBIN TIME, POC: ABNORMAL (ref 2–3)

## 2025-06-17 NOTE — PROGRESS NOTES
POC pt/inr performed.  Pt instructed to take 3.75 mg of warfarin today, then resume with increased dosing.

## 2025-06-25 ENCOUNTER — ANTI-COAG VISIT (OUTPATIENT)
Dept: CARDIOLOGY | Facility: HOSPITAL | Age: 70
End: 2025-06-25
Payer: MEDICARE

## 2025-06-25 DIAGNOSIS — Z79.01 LONG TERM (CURRENT) USE OF ANTICOAGULANTS: ICD-10-CM

## 2025-06-25 DIAGNOSIS — Z95.2 H/O MECHANICAL AORTIC VALVE REPLACEMENT: Primary | ICD-10-CM

## 2025-06-25 LAB
CTP QC/QA: YES
INR PPP: 2.4 (ref 2–3)
PROTHROMBIN TIME, POC: NORMAL (ref 2–3)

## 2025-07-15 ENCOUNTER — PATIENT OUTREACH (OUTPATIENT)
Facility: CLINIC | Age: 70
End: 2025-07-15
Payer: MEDICARE

## 2025-07-15 DIAGNOSIS — E11.22 TYPE 2 DIABETES MELLITUS WITH STAGE 2 CHRONIC KIDNEY DISEASE, UNSPECIFIED WHETHER LONG TERM INSULIN USE: Primary | ICD-10-CM

## 2025-07-15 DIAGNOSIS — N18.2 TYPE 2 DIABETES MELLITUS WITH STAGE 2 CHRONIC KIDNEY DISEASE, UNSPECIFIED WHETHER LONG TERM INSULIN USE: Primary | ICD-10-CM

## 2025-07-15 NOTE — PROGRESS NOTES
Population Health Outreach.    Health Maintenance Topic(s) Outreach Outcomes & Actions Taken:    Eye Exam - Outreach Outcomes & Actions Taken  : External Records Requested & Care Team Updated if Applicable  DM EYE @Steven Lobato MD - MARIBEL sent to [place]    Lab(s) - Outreach Outcomes & Actions Taken  : Overdue Lab(s) Ordered and Primary Care Follow Up Visit Scheduled   A1C due soon and Urine Screening  overdue- added to collect with expected 08/14/25 Labs       Additional Notes:  Next Primary Care Visit Date: 9/23/2025

## 2025-07-15 NOTE — LETTER
AUTHORIZATION FOR RELEASE OF   CONFIDENTIAL INFORMATION        We are seeing Bertin Alonzo, date of birth 1955, in the clinic at [place] [address] [facility]. Cory Villanueva MD is the patient's PCP. Bertin Alonzo has an outstanding lab/procedure at the time we reviewed his chart. In order to help keep his health information updated, he has authorized us to request the following medical record(s):                                               (X  )  EYE EXAM                  Please fax records to [facility], [place], Kenneth E, MD,  at [phone] or email to [email or web address].           Patient Name: Bertin Alonzo  : 1955  Patient Phone #: [phone]

## 2025-07-16 ENCOUNTER — ANTI-COAG VISIT (OUTPATIENT)
Dept: CARDIOLOGY | Facility: HOSPITAL | Age: 70
End: 2025-07-16
Payer: MEDICARE

## 2025-07-16 DIAGNOSIS — Z95.2 H/O MECHANICAL AORTIC VALVE REPLACEMENT: Primary | ICD-10-CM

## 2025-07-16 DIAGNOSIS — Z79.01 LONG TERM (CURRENT) USE OF ANTICOAGULANTS: ICD-10-CM

## 2025-07-16 LAB
CTP QC/QA: YES
INR PPP: 2.2 (ref 2–3)
PROTHROMBIN TIME, POC: NORMAL (ref 2–3)

## 2025-07-16 PROCEDURE — 99211 OFF/OP EST MAY X REQ PHY/QHP: CPT

## 2025-07-24 ENCOUNTER — ANTI-COAG VISIT (OUTPATIENT)
Dept: CARDIOLOGY | Facility: HOSPITAL | Age: 70
End: 2025-07-24
Payer: MEDICARE

## 2025-07-24 DIAGNOSIS — Z95.2 H/O MECHANICAL AORTIC VALVE REPLACEMENT: Primary | ICD-10-CM

## 2025-07-24 DIAGNOSIS — Z79.01 LONG TERM (CURRENT) USE OF ANTICOAGULANTS: ICD-10-CM

## 2025-07-24 DIAGNOSIS — Z79.01 ANTICOAGULATION MONITORING, INR RANGE 2.5-3.5: ICD-10-CM

## 2025-07-24 LAB
CTP QC/QA: YES
INR PPP: 2.6 (ref 2–3)
PROTHROMBIN TIME, POC: NORMAL (ref 2–3)

## 2025-08-04 ENCOUNTER — LAB VISIT (OUTPATIENT)
Dept: LAB | Facility: HOSPITAL | Age: 70
End: 2025-08-04
Attending: INTERNAL MEDICINE
Payer: MEDICARE

## 2025-08-04 DIAGNOSIS — N18.32 CHRONIC KIDNEY DISEASE (CKD) STAGE G3B/A1, MODERATELY DECREASED GLOMERULAR FILTRATION RATE (GFR) BETWEEN 30-44 ML/MIN/1.73 SQUARE METER AND ALBUMINURIA CREATININE RATIO LESS THAN 30 MG/G: ICD-10-CM

## 2025-08-04 DIAGNOSIS — D63.1 ANEMIA OF CHRONIC RENAL FAILURE, UNSPECIFIED CKD STAGE: ICD-10-CM

## 2025-08-04 DIAGNOSIS — I12.9 PARENCHYMAL RENAL HYPERTENSION: ICD-10-CM

## 2025-08-04 DIAGNOSIS — R80.9 PROTEINURIA, UNSPECIFIED TYPE: Primary | ICD-10-CM

## 2025-08-04 DIAGNOSIS — N18.9 ANEMIA OF CHRONIC RENAL FAILURE, UNSPECIFIED CKD STAGE: ICD-10-CM

## 2025-08-04 LAB
25(OH)D3+25(OH)D2 SERPL-MCNC: 60 NG/ML (ref 30–80)
ALBUMIN SERPL-MCNC: 3.7 G/DL (ref 3.4–4.8)
ALBUMIN/GLOB SERPL: 1.1 RATIO (ref 1.1–2)
ALP SERPL-CCNC: 101 UNIT/L (ref 40–150)
ALT SERPL-CCNC: 50 UNIT/L (ref 0–55)
ANION GAP SERPL CALC-SCNC: 8 MEQ/L
AST SERPL-CCNC: 54 UNIT/L (ref 11–45)
BASOPHILS # BLD AUTO: 0.12 X10(3)/MCL
BASOPHILS NFR BLD AUTO: 1.3 %
BILIRUB SERPL-MCNC: 1.3 MG/DL
BUN SERPL-MCNC: 24.4 MG/DL (ref 8.4–25.7)
CALCIUM SERPL-MCNC: 9.2 MG/DL (ref 8.8–10)
CHLORIDE SERPL-SCNC: 105 MMOL/L (ref 98–107)
CO2 SERPL-SCNC: 27 MMOL/L (ref 23–31)
CREAT SERPL-MCNC: 1.7 MG/DL (ref 0.72–1.25)
CREAT/UREA NIT SERPL: 14
EOSINOPHIL # BLD AUTO: 0.15 X10(3)/MCL (ref 0–0.9)
EOSINOPHIL NFR BLD AUTO: 1.6 %
ERYTHROCYTE [DISTWIDTH] IN BLOOD BY AUTOMATED COUNT: 15.5 % (ref 11.5–17)
GFR SERPLBLD CREATININE-BSD FMLA CKD-EPI: 43 ML/MIN/1.73/M2
GLOBULIN SER-MCNC: 3.5 GM/DL (ref 2.4–3.5)
GLUCOSE SERPL-MCNC: 129 MG/DL (ref 82–115)
HCT VFR BLD AUTO: 45.8 % (ref 42–52)
HGB BLD-MCNC: 14.4 G/DL (ref 14–18)
IMM GRANULOCYTES # BLD AUTO: 0.04 X10(3)/MCL (ref 0–0.04)
IMM GRANULOCYTES NFR BLD AUTO: 0.4 %
LYMPHOCYTES # BLD AUTO: 0.71 X10(3)/MCL (ref 0.6–4.6)
LYMPHOCYTES NFR BLD AUTO: 7.4 %
MAGNESIUM SERPL-MCNC: 2.6 MG/DL (ref 1.6–2.6)
MCH RBC QN AUTO: 30.8 PG (ref 27–31)
MCHC RBC AUTO-ENTMCNC: 31.4 G/DL (ref 33–36)
MCV RBC AUTO: 98.1 FL (ref 80–94)
MONOCYTES # BLD AUTO: 1.28 X10(3)/MCL (ref 0.1–1.3)
MONOCYTES NFR BLD AUTO: 13.4 %
NEUTROPHILS # BLD AUTO: 7.26 X10(3)/MCL (ref 2.1–9.2)
NEUTROPHILS NFR BLD AUTO: 75.9 %
NRBC BLD AUTO-RTO: 0 %
PHOSPHATE SERPL-MCNC: 3.3 MG/DL (ref 2.3–4.7)
PLATELET # BLD AUTO: 303 X10(3)/MCL (ref 130–400)
PMV BLD AUTO: 9.5 FL (ref 7.4–10.4)
POTASSIUM SERPL-SCNC: 4.6 MMOL/L (ref 3.5–5.1)
PROT SERPL-MCNC: 7.2 GM/DL (ref 5.8–7.6)
PTH-INTACT SERPL-MCNC: 47.7 PG/ML (ref 8.7–77)
RBC # BLD AUTO: 4.67 X10(6)/MCL (ref 4.7–6.1)
SODIUM SERPL-SCNC: 140 MMOL/L (ref 136–145)
WBC # BLD AUTO: 9.56 X10(3)/MCL (ref 4.5–11.5)

## 2025-08-04 PROCEDURE — 84100 ASSAY OF PHOSPHORUS: CPT

## 2025-08-04 PROCEDURE — 83970 ASSAY OF PARATHORMONE: CPT

## 2025-08-04 PROCEDURE — 80053 COMPREHEN METABOLIC PANEL: CPT

## 2025-08-04 PROCEDURE — 82306 VITAMIN D 25 HYDROXY: CPT

## 2025-08-04 PROCEDURE — 36415 COLL VENOUS BLD VENIPUNCTURE: CPT

## 2025-08-04 PROCEDURE — 85025 COMPLETE CBC W/AUTO DIFF WBC: CPT

## 2025-08-04 PROCEDURE — 83735 ASSAY OF MAGNESIUM: CPT

## 2025-08-14 ENCOUNTER — ANTI-COAG VISIT (OUTPATIENT)
Dept: CARDIOLOGY | Facility: HOSPITAL | Age: 70
End: 2025-08-14
Payer: MEDICARE

## 2025-08-14 DIAGNOSIS — Z79.01 LONG TERM (CURRENT) USE OF ANTICOAGULANTS: ICD-10-CM

## 2025-08-14 DIAGNOSIS — Z95.2 H/O MECHANICAL AORTIC VALVE REPLACEMENT: Primary | ICD-10-CM

## 2025-08-14 DIAGNOSIS — Z79.01 ANTICOAGULATION MONITORING, INR RANGE 2.5-3.5: ICD-10-CM

## 2025-08-14 LAB
CTP QC/QA: YES
INR PPP: 2.9 (ref 2–3)
PROTHROMBIN TIME, POC: NORMAL (ref 2–3)

## 2025-08-14 PROCEDURE — 99211 OFF/OP EST MAY X REQ PHY/QHP: CPT
